# Patient Record
Sex: MALE | Race: WHITE | NOT HISPANIC OR LATINO | Employment: OTHER | URBAN - METROPOLITAN AREA
[De-identification: names, ages, dates, MRNs, and addresses within clinical notes are randomized per-mention and may not be internally consistent; named-entity substitution may affect disease eponyms.]

---

## 2017-01-01 ENCOUNTER — INFUSION (OUTPATIENT)
Dept: ONCOLOGY | Facility: HOSPITAL | Age: 67
End: 2017-01-01

## 2017-01-01 ENCOUNTER — DOCUMENTATION (OUTPATIENT)
Dept: ONCOLOGY | Facility: CLINIC | Age: 67
End: 2017-01-01

## 2017-01-01 ENCOUNTER — ANESTHESIA (OUTPATIENT)
Dept: PERIOP | Facility: HOSPITAL | Age: 67
End: 2017-01-01

## 2017-01-01 ENCOUNTER — APPOINTMENT (OUTPATIENT)
Dept: ONCOLOGY | Facility: HOSPITAL | Age: 67
End: 2017-01-01

## 2017-01-01 ENCOUNTER — HOSPITAL ENCOUNTER (OUTPATIENT)
Facility: HOSPITAL | Age: 67
Discharge: HOME | End: 2017-10-31
Attending: EMERGENCY MEDICINE | Admitting: INTERNAL MEDICINE

## 2017-01-01 ENCOUNTER — OFFICE VISIT (OUTPATIENT)
Dept: ONCOLOGY | Facility: CLINIC | Age: 67
End: 2017-01-01

## 2017-01-01 ENCOUNTER — HOSPITAL ENCOUNTER (OUTPATIENT)
Dept: ULTRASOUND IMAGING | Facility: HOSPITAL | Age: 67
Discharge: HOME | End: 2017-08-16

## 2017-01-01 ENCOUNTER — TELEPHONE (OUTPATIENT)
Dept: ONCOLOGY | Facility: HOSPITAL | Age: 67
End: 2017-01-01

## 2017-01-01 ENCOUNTER — TELEPHONE (OUTPATIENT)
Dept: INTERVENTIONAL RADIOLOGY/VASCULAR | Facility: HOSPITAL | Age: 67
End: 2017-01-01

## 2017-01-01 ENCOUNTER — LAB (OUTPATIENT)
Dept: OTHER | Facility: HOSPITAL | Age: 67
End: 2017-01-01

## 2017-01-01 ENCOUNTER — TELEPHONE (OUTPATIENT)
Dept: ONCOLOGY | Facility: CLINIC | Age: 67
End: 2017-01-01

## 2017-01-01 ENCOUNTER — HOSPITAL ENCOUNTER (EMERGENCY)
Facility: HOSPITAL | Age: 67
Discharge: HOME | End: 2017-09-21
Attending: EMERGENCY MEDICINE | Admitting: EMERGENCY MEDICINE

## 2017-01-01 ENCOUNTER — APPOINTMENT (OUTPATIENT)
Dept: CT IMAGING | Facility: HOSPITAL | Age: 67
End: 2017-01-01

## 2017-01-01 ENCOUNTER — ANESTHESIA EVENT (OUTPATIENT)
Dept: PERIOP | Facility: HOSPITAL | Age: 67
End: 2017-01-01

## 2017-01-01 ENCOUNTER — APPOINTMENT (OUTPATIENT)
Dept: OTHER | Facility: HOSPITAL | Age: 67
End: 2017-01-01

## 2017-01-01 ENCOUNTER — HOSPITAL ENCOUNTER (OUTPATIENT)
Dept: ULTRASOUND IMAGING | Facility: HOSPITAL | Age: 67
Discharge: HOME | End: 2017-08-16
Admitting: NURSE PRACTITIONER

## 2017-01-01 ENCOUNTER — HOSPITAL ENCOUNTER (OUTPATIENT)
Dept: PET IMAGING | Facility: HOSPITAL | Age: 67
Discharge: HOME | End: 2017-07-06
Attending: INTERNAL MEDICINE | Admitting: INTERNAL MEDICINE

## 2017-01-01 ENCOUNTER — APPOINTMENT (OUTPATIENT)
Dept: GENERAL RADIOLOGY | Facility: HOSPITAL | Age: 67
End: 2017-01-01

## 2017-01-01 VITALS
BODY MASS INDEX: 29.82 KG/M2 | RESPIRATION RATE: 18 BRPM | OXYGEN SATURATION: 98 % | SYSTOLIC BLOOD PRESSURE: 118 MMHG | HEIGHT: 73 IN | TEMPERATURE: 97.7 F | WEIGHT: 225 LBS | DIASTOLIC BLOOD PRESSURE: 73 MMHG | HEART RATE: 92 BPM

## 2017-01-01 VITALS
RESPIRATION RATE: 16 BRPM | BODY MASS INDEX: 30.75 KG/M2 | DIASTOLIC BLOOD PRESSURE: 69 MMHG | SYSTOLIC BLOOD PRESSURE: 110 MMHG | HEART RATE: 84 BPM | TEMPERATURE: 97.9 F | HEIGHT: 73 IN | WEIGHT: 232 LBS | OXYGEN SATURATION: 95 %

## 2017-01-01 VITALS
HEART RATE: 76 BPM | SYSTOLIC BLOOD PRESSURE: 95 MMHG | WEIGHT: 236.6 LBS | OXYGEN SATURATION: 96 % | DIASTOLIC BLOOD PRESSURE: 63 MMHG | BODY MASS INDEX: 31.22 KG/M2 | TEMPERATURE: 98.7 F

## 2017-01-01 VITALS
OXYGEN SATURATION: 94 % | HEIGHT: 73 IN | WEIGHT: 224.6 LBS | DIASTOLIC BLOOD PRESSURE: 69 MMHG | HEART RATE: 95 BPM | BODY MASS INDEX: 29.77 KG/M2 | TEMPERATURE: 97.6 F | SYSTOLIC BLOOD PRESSURE: 103 MMHG

## 2017-01-01 VITALS
SYSTOLIC BLOOD PRESSURE: 114 MMHG | HEIGHT: 73 IN | RESPIRATION RATE: 16 BRPM | HEART RATE: 86 BPM | WEIGHT: 236 LBS | TEMPERATURE: 97.6 F | OXYGEN SATURATION: 94 % | BODY MASS INDEX: 31.28 KG/M2 | DIASTOLIC BLOOD PRESSURE: 69 MMHG

## 2017-01-01 VITALS
RESPIRATION RATE: 18 BRPM | HEART RATE: 88 BPM | WEIGHT: 233 LBS | OXYGEN SATURATION: 95 % | TEMPERATURE: 97.6 F | DIASTOLIC BLOOD PRESSURE: 75 MMHG | HEIGHT: 73 IN | SYSTOLIC BLOOD PRESSURE: 116 MMHG | BODY MASS INDEX: 30.88 KG/M2

## 2017-01-01 VITALS
BODY MASS INDEX: 29.69 KG/M2 | WEIGHT: 224 LBS | HEART RATE: 96 BPM | RESPIRATION RATE: 18 BRPM | TEMPERATURE: 97.7 F | DIASTOLIC BLOOD PRESSURE: 62 MMHG | HEIGHT: 73 IN | SYSTOLIC BLOOD PRESSURE: 101 MMHG | OXYGEN SATURATION: 96 %

## 2017-01-01 VITALS
BODY MASS INDEX: 31.28 KG/M2 | SYSTOLIC BLOOD PRESSURE: 90 MMHG | HEART RATE: 72 BPM | DIASTOLIC BLOOD PRESSURE: 60 MMHG | RESPIRATION RATE: 16 BRPM | HEIGHT: 73 IN | WEIGHT: 236 LBS | TEMPERATURE: 97.9 F

## 2017-01-01 VITALS
DIASTOLIC BLOOD PRESSURE: 65 MMHG | BODY MASS INDEX: 29.1 KG/M2 | HEIGHT: 73 IN | TEMPERATURE: 97.5 F | TEMPERATURE: 98 F | BODY MASS INDEX: 31.14 KG/M2 | SYSTOLIC BLOOD PRESSURE: 107 MMHG | OXYGEN SATURATION: 97 % | RESPIRATION RATE: 16 BRPM | HEART RATE: 88 BPM | WEIGHT: 236 LBS | HEART RATE: 83 BPM | OXYGEN SATURATION: 95 % | DIASTOLIC BLOOD PRESSURE: 63 MMHG | SYSTOLIC BLOOD PRESSURE: 97 MMHG

## 2017-01-01 VITALS
TEMPERATURE: 97.6 F | BODY MASS INDEX: 29.8 KG/M2 | BODY MASS INDEX: 29.69 KG/M2 | SYSTOLIC BLOOD PRESSURE: 109 MMHG | TEMPERATURE: 97.7 F | SYSTOLIC BLOOD PRESSURE: 103 MMHG | DIASTOLIC BLOOD PRESSURE: 68 MMHG | HEART RATE: 93 BPM | OXYGEN SATURATION: 95 % | DIASTOLIC BLOOD PRESSURE: 74 MMHG | WEIGHT: 225.8 LBS | WEIGHT: 225 LBS | HEART RATE: 93 BPM

## 2017-01-01 VITALS
DIASTOLIC BLOOD PRESSURE: 64 MMHG | RESPIRATION RATE: 16 BRPM | OXYGEN SATURATION: 94 % | BODY MASS INDEX: 29.63 KG/M2 | TEMPERATURE: 98.1 F | HEART RATE: 91 BPM | HEIGHT: 73 IN | WEIGHT: 223.6 LBS | SYSTOLIC BLOOD PRESSURE: 101 MMHG

## 2017-01-01 VITALS
OXYGEN SATURATION: 96 % | SYSTOLIC BLOOD PRESSURE: 89 MMHG | WEIGHT: 239.8 LBS | DIASTOLIC BLOOD PRESSURE: 63 MMHG | SYSTOLIC BLOOD PRESSURE: 102 MMHG | DIASTOLIC BLOOD PRESSURE: 50 MMHG | BODY MASS INDEX: 31.64 KG/M2 | WEIGHT: 216.4 LBS | BODY MASS INDEX: 28.55 KG/M2 | TEMPERATURE: 98.3 F | OXYGEN SATURATION: 95 % | HEART RATE: 83 BPM | HEART RATE: 88 BPM | TEMPERATURE: 99.1 F

## 2017-01-01 VITALS
HEART RATE: 93 BPM | WEIGHT: 216.4 LBS | DIASTOLIC BLOOD PRESSURE: 62 MMHG | SYSTOLIC BLOOD PRESSURE: 96 MMHG | OXYGEN SATURATION: 91 % | TEMPERATURE: 97.7 F | BODY MASS INDEX: 28.55 KG/M2

## 2017-01-01 VITALS
TEMPERATURE: 97.8 F | HEIGHT: 73 IN | SYSTOLIC BLOOD PRESSURE: 116 MMHG | WEIGHT: 248.6 LBS | OXYGEN SATURATION: 96 % | BODY MASS INDEX: 32.95 KG/M2 | HEART RATE: 96 BPM | DIASTOLIC BLOOD PRESSURE: 72 MMHG

## 2017-01-01 VITALS
DIASTOLIC BLOOD PRESSURE: 69 MMHG | TEMPERATURE: 97.2 F | SYSTOLIC BLOOD PRESSURE: 109 MMHG | BODY MASS INDEX: 28.5 KG/M2 | HEART RATE: 98 BPM | OXYGEN SATURATION: 94 % | WEIGHT: 216 LBS

## 2017-01-01 VITALS
DIASTOLIC BLOOD PRESSURE: 71 MMHG | RESPIRATION RATE: 16 BRPM | OXYGEN SATURATION: 97 % | BODY MASS INDEX: 33.59 KG/M2 | WEIGHT: 248 LBS | HEIGHT: 72 IN | SYSTOLIC BLOOD PRESSURE: 124 MMHG | TEMPERATURE: 97 F | HEART RATE: 87 BPM

## 2017-01-01 VITALS
WEIGHT: 237 LBS | OXYGEN SATURATION: 95 % | SYSTOLIC BLOOD PRESSURE: 96 MMHG | TEMPERATURE: 98.5 F | HEART RATE: 73 BPM | DIASTOLIC BLOOD PRESSURE: 64 MMHG | BODY MASS INDEX: 31.27 KG/M2

## 2017-01-01 VITALS — DIASTOLIC BLOOD PRESSURE: 63 MMHG | TEMPERATURE: 98.1 F | SYSTOLIC BLOOD PRESSURE: 99 MMHG | HEART RATE: 81 BPM

## 2017-01-01 VITALS
BODY MASS INDEX: 31.82 KG/M2 | WEIGHT: 241.2 LBS | DIASTOLIC BLOOD PRESSURE: 63 MMHG | HEART RATE: 80 BPM | SYSTOLIC BLOOD PRESSURE: 97 MMHG | TEMPERATURE: 97.8 F

## 2017-01-01 VITALS
RESPIRATION RATE: 18 BRPM | WEIGHT: 223 LBS | HEIGHT: 73 IN | TEMPERATURE: 98.3 F | SYSTOLIC BLOOD PRESSURE: 94 MMHG | OXYGEN SATURATION: 94 % | HEART RATE: 92 BPM | BODY MASS INDEX: 29.55 KG/M2 | DIASTOLIC BLOOD PRESSURE: 65 MMHG

## 2017-01-01 VITALS
DIASTOLIC BLOOD PRESSURE: 69 MMHG | BODY MASS INDEX: 28.26 KG/M2 | WEIGHT: 214.2 LBS | HEART RATE: 90 BPM | OXYGEN SATURATION: 96 % | TEMPERATURE: 97.3 F | SYSTOLIC BLOOD PRESSURE: 105 MMHG

## 2017-01-01 VITALS
BODY MASS INDEX: 29.12 KG/M2 | HEIGHT: 72 IN | WEIGHT: 215 LBS | OXYGEN SATURATION: 97 % | RESPIRATION RATE: 18 BRPM | TEMPERATURE: 98.2 F | DIASTOLIC BLOOD PRESSURE: 88 MMHG | SYSTOLIC BLOOD PRESSURE: 121 MMHG | HEART RATE: 96 BPM

## 2017-01-01 VITALS — BODY MASS INDEX: 29.42 KG/M2 | WEIGHT: 223 LBS

## 2017-01-01 VITALS
WEIGHT: 239.6 LBS | HEART RATE: 73 BPM | DIASTOLIC BLOOD PRESSURE: 67 MMHG | OXYGEN SATURATION: 96 % | TEMPERATURE: 98.5 F | SYSTOLIC BLOOD PRESSURE: 102 MMHG | BODY MASS INDEX: 31.61 KG/M2

## 2017-01-01 VITALS — WEIGHT: 220.6 LBS | BODY MASS INDEX: 29.1 KG/M2

## 2017-01-01 VITALS — WEIGHT: 239.2 LBS | BODY MASS INDEX: 31.56 KG/M2

## 2017-01-01 DIAGNOSIS — C79.51 METASTASIS TO SPINAL COLUMN (HCC): ICD-10-CM

## 2017-01-01 DIAGNOSIS — D70.1 LEUKOPENIA DUE TO ANTINEOPLASTIC CHEMOTHERAPY (HCC): ICD-10-CM

## 2017-01-01 DIAGNOSIS — C25.9 PANCREAS CARCINOMA (HCC): ICD-10-CM

## 2017-01-01 DIAGNOSIS — C78.7 LIVER METASTASES: ICD-10-CM

## 2017-01-01 DIAGNOSIS — R16.1 SPLENOMEGALY: ICD-10-CM

## 2017-01-01 DIAGNOSIS — C25.9 PANCREAS CARCINOMA (HCC): Primary | ICD-10-CM

## 2017-01-01 DIAGNOSIS — G89.3 CANCER ASSOCIATED PAIN: ICD-10-CM

## 2017-01-01 DIAGNOSIS — D61.810 PANCYTOPENIA DUE TO CHEMOTHERAPY (HCC): ICD-10-CM

## 2017-01-01 DIAGNOSIS — C79.51 BONE METASTASIS: ICD-10-CM

## 2017-01-01 DIAGNOSIS — R18.0 MALIGNANT ASCITES: ICD-10-CM

## 2017-01-01 DIAGNOSIS — Z45.2 ENCOUNTER FOR ADJUSTMENT OR MANAGEMENT OF VASCULAR ACCESS DEVICE: ICD-10-CM

## 2017-01-01 DIAGNOSIS — T45.1X5A LEUKOPENIA DUE TO ANTINEOPLASTIC CHEMOTHERAPY (HCC): ICD-10-CM

## 2017-01-01 DIAGNOSIS — T45.1X5A CHEMOTHERAPY INDUCED DIARRHEA: ICD-10-CM

## 2017-01-01 DIAGNOSIS — T45.1X5A NEUROPATHY DUE TO CHEMOTHERAPEUTIC DRUG (HCC): ICD-10-CM

## 2017-01-01 DIAGNOSIS — D63.0 ANEMIA IN NEOPLASTIC DISEASE: ICD-10-CM

## 2017-01-01 DIAGNOSIS — G62.0 NEUROPATHY DUE TO CHEMOTHERAPEUTIC DRUG (HCC): ICD-10-CM

## 2017-01-01 DIAGNOSIS — L98.9 SKIN ABNORMALITY: Primary | ICD-10-CM

## 2017-01-01 DIAGNOSIS — G62.0 NEUROPATHY DUE TO CHEMOTHERAPEUTIC DRUG (HCC): Primary | ICD-10-CM

## 2017-01-01 DIAGNOSIS — D69.6 THROMBOCYTOPENIA DUE TO SEQUESTRATION (HCC): ICD-10-CM

## 2017-01-01 DIAGNOSIS — D69.6 ACQUIRED THROMBOCYTOPENIA (HCC): ICD-10-CM

## 2017-01-01 DIAGNOSIS — Z45.2 ENCOUNTER FOR ADJUSTMENT OR MANAGEMENT OF VASCULAR ACCESS DEVICE: Primary | ICD-10-CM

## 2017-01-01 DIAGNOSIS — T45.1X5A NEUROPATHY DUE TO CHEMOTHERAPEUTIC DRUG (HCC): Primary | ICD-10-CM

## 2017-01-01 DIAGNOSIS — C78.7 LIVER METASTASES: Primary | ICD-10-CM

## 2017-01-01 DIAGNOSIS — K52.1 CHEMOTHERAPY INDUCED DIARRHEA: ICD-10-CM

## 2017-01-01 DIAGNOSIS — E86.0 DEHYDRATION: ICD-10-CM

## 2017-01-01 DIAGNOSIS — N20.1 URETERAL STONE: Primary | ICD-10-CM

## 2017-01-01 DIAGNOSIS — N13.2 URETERAL STONE WITH HYDRONEPHROSIS: Primary | ICD-10-CM

## 2017-01-01 DIAGNOSIS — R52 INTRACTABLE PAIN: ICD-10-CM

## 2017-01-01 DIAGNOSIS — R19.7 DIARRHEA, UNSPECIFIED TYPE: Primary | ICD-10-CM

## 2017-01-01 LAB
ALBUMIN SERPL-MCNC: 2.7 G/DL (ref 3.5–5.2)
ALBUMIN SERPL-MCNC: 2.8 G/DL (ref 3.5–5.2)
ALBUMIN SERPL-MCNC: 2.9 G/DL (ref 3.5–5.2)
ALBUMIN SERPL-MCNC: 3 G/DL (ref 3.5–5.2)
ALBUMIN SERPL-MCNC: 3.1 G/DL (ref 3.5–5.2)
ALBUMIN SERPL-MCNC: 3.2 G/DL (ref 3.5–5.2)
ALBUMIN SERPL-MCNC: 3.3 G/DL (ref 3.5–5.2)
ALBUMIN SERPL-MCNC: 3.3 G/DL (ref 3.5–5.2)
ALBUMIN SERPL-MCNC: 3.4 G/DL (ref 3.5–5.2)
ALBUMIN SERPL-MCNC: 3.4 G/DL (ref 3.5–5.2)
ALBUMIN SERPL-MCNC: 3.5 G/DL (ref 3.5–5.2)
ALBUMIN/GLOB SERPL: 0.8 G/DL
ALBUMIN/GLOB SERPL: 0.9 G/DL
ALBUMIN/GLOB SERPL: 1 G/DL
ALBUMIN/GLOB SERPL: 1.1 G/DL
ALBUMIN/GLOB SERPL: 1.2 G/DL
ALBUMIN/GLOB SERPL: 1.2 G/DL
ALBUMIN/GLOB SERPL: 1.3 G/DL
ALBUMIN/GLOB SERPL: 1.3 G/DL (ref 1.1–2.4)
ALBUMIN/GLOB SERPL: 1.4 G/DL
ALBUMIN/GLOB SERPL: 1.5 G/DL
ALP SERPL-CCNC: 35 U/L (ref 38–116)
ALP SERPL-CCNC: 35 U/L (ref 39–117)
ALP SERPL-CCNC: 35 U/L (ref 39–117)
ALP SERPL-CCNC: 36 U/L (ref 39–117)
ALP SERPL-CCNC: 37 U/L (ref 39–117)
ALP SERPL-CCNC: 39 U/L (ref 39–117)
ALP SERPL-CCNC: 40 U/L (ref 39–117)
ALP SERPL-CCNC: 41 U/L (ref 39–117)
ALP SERPL-CCNC: 42 U/L (ref 39–117)
ALP SERPL-CCNC: 42 U/L (ref 39–117)
ALP SERPL-CCNC: 45 U/L (ref 39–117)
ALP SERPL-CCNC: 46 U/L (ref 39–117)
ALP SERPL-CCNC: 47 U/L (ref 39–117)
ALP SERPL-CCNC: 48 U/L (ref 39–117)
ALP SERPL-CCNC: 49 U/L (ref 39–117)
ALP SERPL-CCNC: 57 U/L (ref 39–117)
ALP SERPL-CCNC: 60 U/L (ref 39–117)
ALT SERPL W P-5'-P-CCNC: 19 U/L (ref 1–41)
ALT SERPL W P-5'-P-CCNC: 20 U/L (ref 1–41)
ALT SERPL W P-5'-P-CCNC: 20 U/L (ref 1–41)
ALT SERPL W P-5'-P-CCNC: 21 U/L (ref 1–41)
ALT SERPL W P-5'-P-CCNC: 21 U/L (ref 1–41)
ALT SERPL W P-5'-P-CCNC: 22 U/L (ref 1–41)
ALT SERPL W P-5'-P-CCNC: 23 U/L (ref 1–41)
ALT SERPL W P-5'-P-CCNC: 24 U/L (ref 1–41)
ALT SERPL W P-5'-P-CCNC: 25 U/L (ref 1–41)
ALT SERPL W P-5'-P-CCNC: 25 U/L (ref 1–41)
ALT SERPL W P-5'-P-CCNC: 27 U/L (ref 1–41)
ALT SERPL W P-5'-P-CCNC: 28 U/L (ref 1–41)
ALT SERPL W P-5'-P-CCNC: 35 U/L (ref 1–41)
ALT SERPL W P-5'-P-CCNC: 37 U/L (ref 1–41)
ALT SERPL W P-5'-P-CCNC: 39 U/L (ref 1–41)
ALT SERPL W P-5'-P-CCNC: 41 U/L (ref 0–41)
ALT SERPL W P-5'-P-CCNC: 42 U/L (ref 1–41)
ANION GAP SERPL CALCULATED.3IONS-SCNC: 10.3 MMOL/L
ANION GAP SERPL CALCULATED.3IONS-SCNC: 10.6 MMOL/L
ANION GAP SERPL CALCULATED.3IONS-SCNC: 10.6 MMOL/L
ANION GAP SERPL CALCULATED.3IONS-SCNC: 11.3 MMOL/L
ANION GAP SERPL CALCULATED.3IONS-SCNC: 11.8 MMOL/L
ANION GAP SERPL CALCULATED.3IONS-SCNC: 11.9 MMOL/L
ANION GAP SERPL CALCULATED.3IONS-SCNC: 12.1 MMOL/L
ANION GAP SERPL CALCULATED.3IONS-SCNC: 12.5 MMOL/L
ANION GAP SERPL CALCULATED.3IONS-SCNC: 12.7 MMOL/L
ANION GAP SERPL CALCULATED.3IONS-SCNC: 12.8 MMOL/L
ANION GAP SERPL CALCULATED.3IONS-SCNC: 13 MMOL/L
ANION GAP SERPL CALCULATED.3IONS-SCNC: 13.1 MMOL/L
ANION GAP SERPL CALCULATED.3IONS-SCNC: 13.3 MMOL/L
ANION GAP SERPL CALCULATED.3IONS-SCNC: 13.5 MMOL/L
ANION GAP SERPL CALCULATED.3IONS-SCNC: 14 MMOL/L
ANION GAP SERPL CALCULATED.3IONS-SCNC: 14.4 MMOL/L
ANION GAP SERPL CALCULATED.3IONS-SCNC: 14.9 MMOL/L
ANION GAP SERPL CALCULATED.3IONS-SCNC: 15.4 MMOL/L
ANION GAP SERPL CALCULATED.3IONS-SCNC: 15.9 MMOL/L
ANION GAP SERPL CALCULATED.3IONS-SCNC: 18.7 MMOL/L
ANION GAP SERPL CALCULATED.3IONS-SCNC: 9.8 MMOL/L
ANION GAP SERPL CALCULATED.3IONS-SCNC: 9.9 MMOL/L
ANISOCYTOSIS BLD QL: NORMAL
AST SERPL-CCNC: 28 U/L (ref 1–40)
AST SERPL-CCNC: 40 U/L (ref 1–40)
AST SERPL-CCNC: 43 U/L (ref 1–40)
AST SERPL-CCNC: 45 U/L (ref 1–40)
AST SERPL-CCNC: 46 U/L (ref 0–40)
AST SERPL-CCNC: 46 U/L (ref 1–40)
AST SERPL-CCNC: 47 U/L (ref 1–40)
AST SERPL-CCNC: 47 U/L (ref 1–40)
AST SERPL-CCNC: 48 U/L (ref 1–40)
AST SERPL-CCNC: 49 U/L (ref 1–40)
AST SERPL-CCNC: 50 U/L (ref 1–40)
AST SERPL-CCNC: 50 U/L (ref 1–40)
AST SERPL-CCNC: 51 U/L (ref 1–40)
AST SERPL-CCNC: 53 U/L (ref 1–40)
AST SERPL-CCNC: 55 U/L (ref 1–40)
AST SERPL-CCNC: 61 U/L (ref 1–40)
AST SERPL-CCNC: 62 U/L (ref 1–40)
AST SERPL-CCNC: 66 U/L (ref 1–40)
BACTERIA UR QL AUTO: ABNORMAL /HPF
BASOPHILS # BLD AUTO: 0.01 10*3/MM3 (ref 0–0.2)
BASOPHILS # BLD AUTO: 0.02 10*3/MM3 (ref 0–0.2)
BASOPHILS # BLD AUTO: 0.03 10*3/MM3 (ref 0–0.1)
BASOPHILS # BLD AUTO: 0.03 10*3/MM3 (ref 0–0.2)
BASOPHILS # BLD AUTO: 0.04 10*3/MM3 (ref 0–0.2)
BASOPHILS # BLD AUTO: 0.05 10*3/MM3 (ref 0–0.2)
BASOPHILS # BLD AUTO: 0.06 10*3/MM3 (ref 0–0.2)
BASOPHILS # BLD AUTO: 0.06 10*3/MM3 (ref 0–0.2)
BASOPHILS # BLD AUTO: 0.07 10*3/MM3 (ref 0–0.2)
BASOPHILS # BLD AUTO: 0.07 10*3/MM3 (ref 0–0.2)
BASOPHILS NFR BLD AUTO: 0.2 % (ref 0–1.5)
BASOPHILS NFR BLD AUTO: 0.3 % (ref 0–1.5)
BASOPHILS NFR BLD AUTO: 0.3 % (ref 0–1.5)
BASOPHILS NFR BLD AUTO: 0.5 % (ref 0–1.5)
BASOPHILS NFR BLD AUTO: 0.6 % (ref 0–1.5)
BASOPHILS NFR BLD AUTO: 0.7 % (ref 0–1.5)
BASOPHILS NFR BLD AUTO: 0.8 % (ref 0–1.1)
BASOPHILS NFR BLD AUTO: 0.8 % (ref 0–1.5)
BASOPHILS NFR BLD AUTO: 0.9 % (ref 0–1.5)
BASOPHILS NFR BLD AUTO: 1 % (ref 0–1.5)
BASOPHILS NFR BLD AUTO: 1 % (ref 0–1.5)
BILIRUB SERPL-MCNC: 0.5 MG/DL (ref 0.1–1.2)
BILIRUB SERPL-MCNC: 0.6 MG/DL (ref 0.1–1.2)
BILIRUB SERPL-MCNC: 0.7 MG/DL (ref 0.1–1.2)
BILIRUB SERPL-MCNC: 0.8 MG/DL (ref 0.1–1.2)
BILIRUB SERPL-MCNC: 0.9 MG/DL (ref 0.1–1.2)
BILIRUB SERPL-MCNC: 1 MG/DL (ref 0.1–1.2)
BILIRUB UR QL STRIP: NEGATIVE
BUN BLD-MCNC: 10 MG/DL (ref 8–23)
BUN BLD-MCNC: 10 MG/DL (ref 8–23)
BUN BLD-MCNC: 11 MG/DL (ref 6–20)
BUN BLD-MCNC: 11 MG/DL (ref 8–23)
BUN BLD-MCNC: 12 MG/DL (ref 8–23)
BUN BLD-MCNC: 13 MG/DL (ref 8–23)
BUN BLD-MCNC: 14 MG/DL (ref 8–23)
BUN BLD-MCNC: 14 MG/DL (ref 8–23)
BUN BLD-MCNC: 16 MG/DL (ref 8–23)
BUN BLD-MCNC: 17 MG/DL (ref 8–23)
BUN BLD-MCNC: 17 MG/DL (ref 8–23)
BUN BLD-MCNC: 19 MG/DL (ref 8–23)
BUN BLD-MCNC: 21 MG/DL (ref 8–23)
BUN BLD-MCNC: 8 MG/DL (ref 8–23)
BUN BLD-MCNC: 9 MG/DL (ref 8–23)
BUN/CREAT SERPL: 11.4 (ref 7–25)
BUN/CREAT SERPL: 11.8 (ref 7–25)
BUN/CREAT SERPL: 12.3 (ref 7–25)
BUN/CREAT SERPL: 12.3 (ref 7–25)
BUN/CREAT SERPL: 12.9 (ref 7–25)
BUN/CREAT SERPL: 12.9 (ref 7–25)
BUN/CREAT SERPL: 13.1 (ref 7–25)
BUN/CREAT SERPL: 13.4 (ref 7–25)
BUN/CREAT SERPL: 13.6 (ref 7.3–30)
BUN/CREAT SERPL: 14.3 (ref 7–25)
BUN/CREAT SERPL: 14.3 (ref 7–25)
BUN/CREAT SERPL: 14.9 (ref 7–25)
BUN/CREAT SERPL: 16.3 (ref 7–25)
BUN/CREAT SERPL: 16.7 (ref 7–25)
BUN/CREAT SERPL: 17.3 (ref 7–25)
BUN/CREAT SERPL: 17.5 (ref 7–25)
BUN/CREAT SERPL: 17.6 (ref 7–25)
BUN/CREAT SERPL: 18.2 (ref 7–25)
BUN/CREAT SERPL: 18.2 (ref 7–25)
BUN/CREAT SERPL: 19.4 (ref 7–25)
BUN/CREAT SERPL: 21.4 (ref 7–25)
BUN/CREAT SERPL: 23.9 (ref 7–25)
BUN/CREAT SERPL: 9.4 (ref 7–25)
BUN/CREAT SERPL: 9.8 (ref 7–25)
BURR CELLS BLD QL SMEAR: NORMAL
CALCIUM SPEC-SCNC: 8.5 MG/DL (ref 8.6–10.5)
CALCIUM SPEC-SCNC: 8.5 MG/DL (ref 8.6–10.5)
CALCIUM SPEC-SCNC: 8.6 MG/DL (ref 8.6–10.5)
CALCIUM SPEC-SCNC: 8.7 MG/DL (ref 8.6–10.5)
CALCIUM SPEC-SCNC: 8.8 MG/DL (ref 8.6–10.5)
CALCIUM SPEC-SCNC: 8.9 MG/DL (ref 8.6–10.5)
CALCIUM SPEC-SCNC: 9 MG/DL (ref 8.6–10.5)
CALCIUM SPEC-SCNC: 9 MG/DL (ref 8.6–10.5)
CALCIUM SPEC-SCNC: 9.1 MG/DL (ref 8.5–10.2)
CALCIUM SPEC-SCNC: 9.1 MG/DL (ref 8.6–10.5)
CALCIUM SPEC-SCNC: 9.2 MG/DL (ref 8.6–10.5)
CANCER AG19-9 SERPL-ACNC: 134.8 U/ML
CANCER AG19-9 SERPL-ACNC: 155 U/ML (ref 0–35)
CANCER AG19-9 SERPL-ACNC: 178.6 U/ML
CANCER AG19-9 SERPL-ACNC: 202.3 U/ML
CANCER AG19-9 SERPL-ACNC: 361.8 U/ML
CHLORIDE SERPL-SCNC: 100 MMOL/L (ref 98–107)
CHLORIDE SERPL-SCNC: 103 MMOL/L (ref 98–107)
CHLORIDE SERPL-SCNC: 104 MMOL/L (ref 98–107)
CHLORIDE SERPL-SCNC: 105 MMOL/L (ref 98–107)
CHLORIDE SERPL-SCNC: 106 MMOL/L (ref 98–107)
CHLORIDE SERPL-SCNC: 107 MMOL/L (ref 98–107)
CHLORIDE SERPL-SCNC: 107 MMOL/L (ref 98–107)
CHLORIDE SERPL-SCNC: 97 MMOL/L (ref 98–107)
CHLORIDE SERPL-SCNC: 98 MMOL/L (ref 98–107)
CLARITY UR: ABNORMAL
CO2 SERPL-SCNC: 17.1 MMOL/L (ref 22–29)
CO2 SERPL-SCNC: 19.6 MMOL/L (ref 22–29)
CO2 SERPL-SCNC: 20 MMOL/L (ref 22–29)
CO2 SERPL-SCNC: 21.3 MMOL/L (ref 22–29)
CO2 SERPL-SCNC: 23.1 MMOL/L (ref 22–29)
CO2 SERPL-SCNC: 23.3 MMOL/L (ref 22–29)
CO2 SERPL-SCNC: 23.5 MMOL/L (ref 22–29)
CO2 SERPL-SCNC: 23.5 MMOL/L (ref 22–29)
CO2 SERPL-SCNC: 23.6 MMOL/L (ref 22–29)
CO2 SERPL-SCNC: 23.7 MMOL/L (ref 22–29)
CO2 SERPL-SCNC: 23.7 MMOL/L (ref 22–29)
CO2 SERPL-SCNC: 23.9 MMOL/L (ref 22–29)
CO2 SERPL-SCNC: 23.9 MMOL/L (ref 22–29)
CO2 SERPL-SCNC: 24.1 MMOL/L (ref 22–29)
CO2 SERPL-SCNC: 24.2 MMOL/L (ref 22–29)
CO2 SERPL-SCNC: 24.7 MMOL/L (ref 22–29)
CO2 SERPL-SCNC: 24.7 MMOL/L (ref 22–29)
CO2 SERPL-SCNC: 25 MMOL/L (ref 22–29)
CO2 SERPL-SCNC: 25.1 MMOL/L (ref 22–29)
CO2 SERPL-SCNC: 25.2 MMOL/L (ref 22–29)
CO2 SERPL-SCNC: 25.2 MMOL/L (ref 22–29)
CO2 SERPL-SCNC: 25.4 MMOL/L (ref 22–29)
CO2 SERPL-SCNC: 25.4 MMOL/L (ref 22–29)
CO2 SERPL-SCNC: 25.7 MMOL/L (ref 22–29)
COLOR UR: YELLOW
CREAT BLD-MCNC: 0.65 MG/DL (ref 0.76–1.27)
CREAT BLD-MCNC: 0.65 MG/DL (ref 0.76–1.27)
CREAT BLD-MCNC: 0.67 MG/DL (ref 0.76–1.27)
CREAT BLD-MCNC: 0.68 MG/DL (ref 0.76–1.27)
CREAT BLD-MCNC: 0.7 MG/DL (ref 0.76–1.27)
CREAT BLD-MCNC: 0.71 MG/DL (ref 0.76–1.27)
CREAT BLD-MCNC: 0.72 MG/DL (ref 0.76–1.27)
CREAT BLD-MCNC: 0.77 MG/DL (ref 0.76–1.27)
CREAT BLD-MCNC: 0.79 MG/DL (ref 0.76–1.27)
CREAT BLD-MCNC: 0.81 MG/DL (ref 0.7–1.3)
CREAT BLD-MCNC: 0.84 MG/DL (ref 0.76–1.27)
CREAT BLD-MCNC: 0.84 MG/DL (ref 0.76–1.27)
CREAT BLD-MCNC: 0.85 MG/DL (ref 0.76–1.27)
CREAT BLD-MCNC: 0.97 MG/DL (ref 0.76–1.27)
CREAT BLD-MCNC: 0.98 MG/DL (ref 0.76–1.27)
CREAT BLD-MCNC: 0.98 MG/DL (ref 0.76–1.27)
CREAT BLD-MCNC: 1.1 MG/DL (ref 0.76–1.27)
CREAT BLD-MCNC: 1.23 MG/DL (ref 0.76–1.27)
CREAT BLDA-MCNC: 0.7 MG/DL (ref 0.6–1.3)
CYTO UR: NORMAL
DACRYOCYTES BLD QL SMEAR: NORMAL
DACRYOCYTES BLD QL SMEAR: NORMAL
DEPRECATED RDW RBC AUTO: 60.5 FL (ref 37–54)
DEPRECATED RDW RBC AUTO: 60.6 FL (ref 37–54)
DEPRECATED RDW RBC AUTO: 60.8 FL (ref 37–54)
DEPRECATED RDW RBC AUTO: 60.9 FL (ref 37–54)
DEPRECATED RDW RBC AUTO: 61.5 FL (ref 37–54)
DEPRECATED RDW RBC AUTO: 62.4 FL (ref 37–54)
DEPRECATED RDW RBC AUTO: 62.4 FL (ref 37–54)
DEPRECATED RDW RBC AUTO: 63.2 FL (ref 37–54)
DEPRECATED RDW RBC AUTO: 65.8 FL (ref 37–54)
DEPRECATED RDW RBC AUTO: 67.7 FL (ref 37–54)
DEPRECATED RDW RBC AUTO: 70.4 FL (ref 37–54)
DEPRECATED RDW RBC AUTO: 72.6 FL (ref 37–54)
DEPRECATED RDW RBC AUTO: 77 FL (ref 37–54)
DEPRECATED RDW RBC AUTO: 80.1 FL (ref 37–54)
DEPRECATED RDW RBC AUTO: 80.3 FL (ref 37–54)
DEPRECATED RDW RBC AUTO: 82.4 FL (ref 37–54)
DEPRECATED RDW RBC AUTO: 84.7 FL (ref 37–49)
DEPRECATED RDW RBC AUTO: 85.1 FL (ref 37–54)
DEPRECATED RDW RBC AUTO: 85.4 FL (ref 37–54)
DEPRECATED RDW RBC AUTO: 86 FL (ref 37–54)
DEPRECATED RDW RBC AUTO: 87 FL (ref 37–54)
DEPRECATED RDW RBC AUTO: 87.3 FL (ref 37–54)
DEPRECATED RDW RBC AUTO: 87.6 FL (ref 37–54)
ELLIPTOCYTES BLD QL SMEAR: NORMAL
EOSINOPHIL # BLD AUTO: 0.04 10*3/MM3 (ref 0–0.7)
EOSINOPHIL # BLD AUTO: 0.08 10*3/MM3 (ref 0–0.7)
EOSINOPHIL # BLD AUTO: 0.09 10*3/MM3 (ref 0–0.7)
EOSINOPHIL # BLD AUTO: 0.11 10*3/MM3 (ref 0–0.7)
EOSINOPHIL # BLD AUTO: 0.12 10*3/MM3 (ref 0–0.7)
EOSINOPHIL # BLD AUTO: 0.14 10*3/MM3 (ref 0–0.7)
EOSINOPHIL # BLD AUTO: 0.15 10*3/MM3 (ref 0–0.7)
EOSINOPHIL # BLD AUTO: 0.16 10*3/MM3 (ref 0–0.7)
EOSINOPHIL # BLD AUTO: 0.17 10*3/MM3 (ref 0–0.7)
EOSINOPHIL # BLD AUTO: 0.19 10*3/MM3 (ref 0–0.7)
EOSINOPHIL # BLD AUTO: 0.19 10*3/MM3 (ref 0–0.7)
EOSINOPHIL # BLD AUTO: 0.2 10*3/MM3 (ref 0–0.7)
EOSINOPHIL # BLD AUTO: 0.22 10*3/MM3 (ref 0–0.7)
EOSINOPHIL # BLD AUTO: 0.27 10*3/MM3 (ref 0–0.7)
EOSINOPHIL # BLD AUTO: 0.32 10*3/MM3 (ref 0–0.36)
EOSINOPHIL # BLD AUTO: 0.34 10*3/MM3 (ref 0–0.7)
EOSINOPHIL # BLD AUTO: 0.35 10*3/MM3 (ref 0–0.7)
EOSINOPHIL # BLD AUTO: 0.35 10*3/MM3 (ref 0–0.7)
EOSINOPHIL # BLD AUTO: 0.36 10*3/MM3 (ref 0–0.7)
EOSINOPHIL # BLD AUTO: 0.37 10*3/MM3 (ref 0–0.7)
EOSINOPHIL # BLD AUTO: 0.37 10*3/MM3 (ref 0–0.7)
EOSINOPHIL # BLD AUTO: 0.43 10*3/MM3 (ref 0–0.7)
EOSINOPHIL # BLD AUTO: 0.45 10*3/MM3 (ref 0–0.7)
EOSINOPHIL # BLD AUTO: 0.52 10*3/MM3 (ref 0–0.7)
EOSINOPHIL NFR BLD AUTO: 1 % (ref 0.3–6.2)
EOSINOPHIL NFR BLD AUTO: 1.5 % (ref 0.3–6.2)
EOSINOPHIL NFR BLD AUTO: 1.9 % (ref 0.3–6.2)
EOSINOPHIL NFR BLD AUTO: 10.6 % (ref 0.3–6.2)
EOSINOPHIL NFR BLD AUTO: 10.9 % (ref 0.3–6.2)
EOSINOPHIL NFR BLD AUTO: 13.4 % (ref 0.3–6.2)
EOSINOPHIL NFR BLD AUTO: 2 % (ref 0.3–6.2)
EOSINOPHIL NFR BLD AUTO: 3.2 % (ref 0.3–6.2)
EOSINOPHIL NFR BLD AUTO: 3.5 % (ref 0.3–6.2)
EOSINOPHIL NFR BLD AUTO: 3.7 % (ref 0.3–6.2)
EOSINOPHIL NFR BLD AUTO: 3.7 % (ref 0.3–6.2)
EOSINOPHIL NFR BLD AUTO: 3.8 % (ref 0.3–6.2)
EOSINOPHIL NFR BLD AUTO: 3.9 % (ref 0.3–6.2)
EOSINOPHIL NFR BLD AUTO: 4 % (ref 0.3–6.2)
EOSINOPHIL NFR BLD AUTO: 4.2 % (ref 0.3–6.2)
EOSINOPHIL NFR BLD AUTO: 4.2 % (ref 0.3–6.2)
EOSINOPHIL NFR BLD AUTO: 4.6 % (ref 0.3–6.2)
EOSINOPHIL NFR BLD AUTO: 5 % (ref 0.3–6.2)
EOSINOPHIL NFR BLD AUTO: 5.4 % (ref 0.3–6.2)
EOSINOPHIL NFR BLD AUTO: 6 % (ref 0.3–6.2)
EOSINOPHIL NFR BLD AUTO: 8.2 % (ref 0.3–6.2)
EOSINOPHIL NFR BLD AUTO: 8.5 % (ref 0.3–6.2)
EOSINOPHIL NFR BLD AUTO: 8.7 % (ref 1–5)
EOSINOPHIL NFR BLD AUTO: 9.2 % (ref 0.3–6.2)
ERYTHROCYTE [DISTWIDTH] IN BLOOD BY AUTOMATED COUNT: 15.6 % (ref 11.5–14.5)
ERYTHROCYTE [DISTWIDTH] IN BLOOD BY AUTOMATED COUNT: 15.7 % (ref 11.5–14.5)
ERYTHROCYTE [DISTWIDTH] IN BLOOD BY AUTOMATED COUNT: 15.9 % (ref 11.5–14.5)
ERYTHROCYTE [DISTWIDTH] IN BLOOD BY AUTOMATED COUNT: 16.1 % (ref 11.5–14.5)
ERYTHROCYTE [DISTWIDTH] IN BLOOD BY AUTOMATED COUNT: 16.2 % (ref 11.5–14.5)
ERYTHROCYTE [DISTWIDTH] IN BLOOD BY AUTOMATED COUNT: 16.6 % (ref 11.5–14.5)
ERYTHROCYTE [DISTWIDTH] IN BLOOD BY AUTOMATED COUNT: 17 % (ref 11.5–14.5)
ERYTHROCYTE [DISTWIDTH] IN BLOOD BY AUTOMATED COUNT: 17.1 % (ref 11.5–14.5)
ERYTHROCYTE [DISTWIDTH] IN BLOOD BY AUTOMATED COUNT: 18.7 % (ref 11.5–14.5)
ERYTHROCYTE [DISTWIDTH] IN BLOOD BY AUTOMATED COUNT: 19.8 % (ref 11.5–14.5)
ERYTHROCYTE [DISTWIDTH] IN BLOOD BY AUTOMATED COUNT: 20.5 % (ref 11.5–14.5)
ERYTHROCYTE [DISTWIDTH] IN BLOOD BY AUTOMATED COUNT: 20.6 % (ref 11.5–14.5)
ERYTHROCYTE [DISTWIDTH] IN BLOOD BY AUTOMATED COUNT: 20.8 % (ref 11.5–14.5)
ERYTHROCYTE [DISTWIDTH] IN BLOOD BY AUTOMATED COUNT: 21.2 % (ref 11.5–14.5)
ERYTHROCYTE [DISTWIDTH] IN BLOOD BY AUTOMATED COUNT: 21.2 % (ref 11.5–14.5)
ERYTHROCYTE [DISTWIDTH] IN BLOOD BY AUTOMATED COUNT: 21.8 % (ref 11.5–14.5)
ERYTHROCYTE [DISTWIDTH] IN BLOOD BY AUTOMATED COUNT: 22.1 % (ref 11.5–14.5)
ERYTHROCYTE [DISTWIDTH] IN BLOOD BY AUTOMATED COUNT: 22.4 % (ref 11.7–14.5)
ERYTHROCYTE [DISTWIDTH] IN BLOOD BY AUTOMATED COUNT: 23.7 % (ref 11.5–14.5)
ERYTHROCYTE [DISTWIDTH] IN BLOOD BY AUTOMATED COUNT: 24.5 % (ref 11.5–14.5)
GFR SERPL CREATININE-BSD FRML MDRD: 101 ML/MIN/1.73
GFR SERPL CREATININE-BSD FRML MDRD: 109 ML/MIN/1.73
GFR SERPL CREATININE-BSD FRML MDRD: 111 ML/MIN/1.73
GFR SERPL CREATININE-BSD FRML MDRD: 112 ML/MIN/1.73
GFR SERPL CREATININE-BSD FRML MDRD: 116 ML/MIN/1.73
GFR SERPL CREATININE-BSD FRML MDRD: 118 ML/MIN/1.73
GFR SERPL CREATININE-BSD FRML MDRD: 123 ML/MIN/1.73
GFR SERPL CREATININE-BSD FRML MDRD: 123 ML/MIN/1.73
GFR SERPL CREATININE-BSD FRML MDRD: 59 ML/MIN/1.73
GFR SERPL CREATININE-BSD FRML MDRD: 67 ML/MIN/1.73
GFR SERPL CREATININE-BSD FRML MDRD: 76 ML/MIN/1.73
GFR SERPL CREATININE-BSD FRML MDRD: 76 ML/MIN/1.73
GFR SERPL CREATININE-BSD FRML MDRD: 77 ML/MIN/1.73
GFR SERPL CREATININE-BSD FRML MDRD: 90 ML/MIN/1.73
GFR SERPL CREATININE-BSD FRML MDRD: 91 ML/MIN/1.73
GFR SERPL CREATININE-BSD FRML MDRD: 91 ML/MIN/1.73
GFR SERPL CREATININE-BSD FRML MDRD: 95 ML/MIN/1.73
GFR SERPL CREATININE-BSD FRML MDRD: 98 ML/MIN/1.73
GLOBULIN UR ELPH-MCNC: 2.1 GM/DL
GLOBULIN UR ELPH-MCNC: 2.3 GM/DL
GLOBULIN UR ELPH-MCNC: 2.3 GM/DL
GLOBULIN UR ELPH-MCNC: 2.4 GM/DL
GLOBULIN UR ELPH-MCNC: 2.5 GM/DL
GLOBULIN UR ELPH-MCNC: 2.5 GM/DL
GLOBULIN UR ELPH-MCNC: 2.6 GM/DL
GLOBULIN UR ELPH-MCNC: 2.6 GM/DL (ref 1.8–3.5)
GLOBULIN UR ELPH-MCNC: 2.7 GM/DL
GLOBULIN UR ELPH-MCNC: 2.8 GM/DL
GLOBULIN UR ELPH-MCNC: 2.8 GM/DL
GLOBULIN UR ELPH-MCNC: 2.9 GM/DL
GLOBULIN UR ELPH-MCNC: 3 GM/DL
GLOBULIN UR ELPH-MCNC: 3.1 GM/DL
GLOBULIN UR ELPH-MCNC: 3.2 GM/DL
GLOBULIN UR ELPH-MCNC: 3.2 GM/DL
GLOBULIN UR ELPH-MCNC: 3.4 GM/DL
GLUCOSE BLD-MCNC: 101 MG/DL (ref 74–124)
GLUCOSE BLD-MCNC: 102 MG/DL (ref 65–99)
GLUCOSE BLD-MCNC: 103 MG/DL (ref 65–99)
GLUCOSE BLD-MCNC: 103 MG/DL (ref 65–99)
GLUCOSE BLD-MCNC: 104 MG/DL (ref 65–99)
GLUCOSE BLD-MCNC: 106 MG/DL (ref 65–99)
GLUCOSE BLD-MCNC: 107 MG/DL (ref 65–99)
GLUCOSE BLD-MCNC: 109 MG/DL (ref 65–99)
GLUCOSE BLD-MCNC: 113 MG/DL (ref 65–99)
GLUCOSE BLD-MCNC: 113 MG/DL (ref 65–99)
GLUCOSE BLD-MCNC: 117 MG/DL (ref 65–99)
GLUCOSE BLD-MCNC: 119 MG/DL (ref 65–99)
GLUCOSE BLD-MCNC: 119 MG/DL (ref 65–99)
GLUCOSE BLD-MCNC: 126 MG/DL (ref 65–99)
GLUCOSE BLD-MCNC: 149 MG/DL (ref 65–99)
GLUCOSE BLD-MCNC: 97 MG/DL (ref 65–99)
GLUCOSE BLD-MCNC: 97 MG/DL (ref 65–99)
GLUCOSE BLD-MCNC: 98 MG/DL (ref 65–99)
GLUCOSE BLD-MCNC: 99 MG/DL (ref 65–99)
GLUCOSE UR STRIP-MCNC: NEGATIVE MG/DL
HCT VFR BLD AUTO: 25.9 % (ref 40.4–52.2)
HCT VFR BLD AUTO: 26.9 % (ref 40.4–52.2)
HCT VFR BLD AUTO: 28.2 % (ref 40.4–52.2)
HCT VFR BLD AUTO: 29.1 % (ref 40.4–52.2)
HCT VFR BLD AUTO: 29.8 % (ref 40.4–52.2)
HCT VFR BLD AUTO: 30.4 % (ref 40.4–52.2)
HCT VFR BLD AUTO: 30.4 % (ref 40.4–52.2)
HCT VFR BLD AUTO: 30.7 % (ref 40.4–52.2)
HCT VFR BLD AUTO: 30.9 % (ref 40.4–52.2)
HCT VFR BLD AUTO: 31.4 % (ref 40–49)
HCT VFR BLD AUTO: 31.5 % (ref 40.4–52.2)
HCT VFR BLD AUTO: 31.6 % (ref 40.4–52.2)
HCT VFR BLD AUTO: 31.8 % (ref 40.4–52.2)
HCT VFR BLD AUTO: 32.1 % (ref 40.4–52.2)
HCT VFR BLD AUTO: 32.2 % (ref 40.4–52.2)
HCT VFR BLD AUTO: 32.2 % (ref 40.4–52.2)
HCT VFR BLD AUTO: 32.7 % (ref 40.4–52.2)
HCT VFR BLD AUTO: 33.1 % (ref 40.4–52.2)
HCT VFR BLD AUTO: 33.5 % (ref 40.4–52.2)
HCT VFR BLD AUTO: 33.6 % (ref 40.4–52.2)
HCT VFR BLD AUTO: 33.7 % (ref 40.4–52.2)
HCT VFR BLD AUTO: 34.3 % (ref 40.4–52.2)
HCT VFR BLD AUTO: 34.6 % (ref 40.4–52.2)
HCT VFR BLD AUTO: 35 % (ref 40.4–52.2)
HGB BLD-MCNC: 10.1 G/DL (ref 13.7–17.6)
HGB BLD-MCNC: 10.2 G/DL (ref 13.7–17.6)
HGB BLD-MCNC: 10.2 G/DL (ref 13.7–17.6)
HGB BLD-MCNC: 10.3 G/DL (ref 13.7–17.6)
HGB BLD-MCNC: 10.5 G/DL (ref 13.7–17.6)
HGB BLD-MCNC: 10.6 G/DL (ref 13.5–16.5)
HGB BLD-MCNC: 10.6 G/DL (ref 13.7–17.6)
HGB BLD-MCNC: 11 G/DL (ref 13.7–17.6)
HGB BLD-MCNC: 11.1 G/DL (ref 13.7–17.6)
HGB BLD-MCNC: 11.4 G/DL (ref 13.7–17.6)
HGB BLD-MCNC: 11.5 G/DL (ref 13.7–17.6)
HGB BLD-MCNC: 11.7 G/DL (ref 13.7–17.6)
HGB BLD-MCNC: 11.7 G/DL (ref 13.7–17.6)
HGB BLD-MCNC: 8.8 G/DL (ref 13.7–17.6)
HGB BLD-MCNC: 9.1 G/DL (ref 13.7–17.6)
HGB BLD-MCNC: 9.4 G/DL (ref 13.7–17.6)
HGB BLD-MCNC: 9.5 G/DL (ref 13.7–17.6)
HGB BLD-MCNC: 9.8 G/DL (ref 13.7–17.6)
HGB UR QL STRIP.AUTO: ABNORMAL
HOLD SPECIMEN: NORMAL
HYALINE CASTS UR QL AUTO: ABNORMAL /LPF
IMM GRANULOCYTES # BLD: 0 10*3/MM3 (ref 0–0.03)
IMM GRANULOCYTES # BLD: 0.01 10*3/MM3 (ref 0–0.03)
IMM GRANULOCYTES # BLD: 0.02 10*3/MM3 (ref 0–0.03)
IMM GRANULOCYTES # BLD: 0.08 10*3/MM3 (ref 0–0.03)
IMM GRANULOCYTES # BLD: 0.1 10*3/MM3 (ref 0–0.03)
IMM GRANULOCYTES # BLD: 0.1 10*3/MM3 (ref 0–0.03)
IMM GRANULOCYTES # BLD: 0.11 10*3/MM3 (ref 0–0.03)
IMM GRANULOCYTES # BLD: 0.17 10*3/MM3 (ref 0–0.03)
IMM GRANULOCYTES NFR BLD: 0 % (ref 0–0.5)
IMM GRANULOCYTES NFR BLD: 0.2 % (ref 0–0.5)
IMM GRANULOCYTES NFR BLD: 0.3 % (ref 0–0.5)
IMM GRANULOCYTES NFR BLD: 0.4 % (ref 0–0.5)
IMM GRANULOCYTES NFR BLD: 0.5 % (ref 0–0.5)
IMM GRANULOCYTES NFR BLD: 0.5 % (ref 0–0.5)
IMM GRANULOCYTES NFR BLD: 0.6 % (ref 0–0.5)
IMM GRANULOCYTES NFR BLD: 1 % (ref 0–0.5)
IMM GRANULOCYTES NFR BLD: 1.3 % (ref 0–0.5)
IMM GRANULOCYTES NFR BLD: 1.4 % (ref 0–0.5)
IMM GRANULOCYTES NFR BLD: 2.7 % (ref 0–0.5)
INR PPP: 1.1 (ref 0.8–1.2)
KETONES UR QL STRIP: ABNORMAL
LAB AP CASE REPORT: NORMAL
LEUKOCYTE ESTERASE UR QL STRIP.AUTO: NEGATIVE
LYMPHOCYTES # BLD AUTO: 0.26 10*3/MM3 (ref 0.9–4.8)
LYMPHOCYTES # BLD AUTO: 0.39 10*3/MM3 (ref 0.9–4.8)
LYMPHOCYTES # BLD AUTO: 0.42 10*3/MM3 (ref 0.9–4.8)
LYMPHOCYTES # BLD AUTO: 0.44 10*3/MM3 (ref 0.9–4.8)
LYMPHOCYTES # BLD AUTO: 0.45 10*3/MM3 (ref 0.9–4.8)
LYMPHOCYTES # BLD AUTO: 0.45 10*3/MM3 (ref 0.9–4.8)
LYMPHOCYTES # BLD AUTO: 0.47 10*3/MM3 (ref 0.9–4.8)
LYMPHOCYTES # BLD AUTO: 0.5 10*3/MM3 (ref 0.9–4.8)
LYMPHOCYTES # BLD AUTO: 0.52 10*3/MM3 (ref 0.9–4.8)
LYMPHOCYTES # BLD AUTO: 0.53 10*3/MM3 (ref 0.9–4.8)
LYMPHOCYTES # BLD AUTO: 0.54 10*3/MM3 (ref 0.9–4.8)
LYMPHOCYTES # BLD AUTO: 0.54 10*3/MM3 (ref 1–3.5)
LYMPHOCYTES # BLD AUTO: 0.55 10*3/MM3 (ref 0.9–4.8)
LYMPHOCYTES # BLD AUTO: 0.6 10*3/MM3 (ref 0.9–4.8)
LYMPHOCYTES # BLD AUTO: 0.62 10*3/MM3 (ref 0.9–4.8)
LYMPHOCYTES # BLD AUTO: 0.65 10*3/MM3 (ref 0.9–4.8)
LYMPHOCYTES # BLD AUTO: 0.77 10*3/MM3 (ref 0.9–4.8)
LYMPHOCYTES # BLD AUTO: 0.78 10*3/MM3 (ref 0.9–4.8)
LYMPHOCYTES # BLD AUTO: 0.79 10*3/MM3 (ref 0.9–4.8)
LYMPHOCYTES # BLD AUTO: 1.09 10*3/MM3 (ref 0.9–4.8)
LYMPHOCYTES NFR BLD AUTO: 10.1 % (ref 19.6–45.3)
LYMPHOCYTES NFR BLD AUTO: 10.4 % (ref 19.6–45.3)
LYMPHOCYTES NFR BLD AUTO: 10.6 % (ref 19.6–45.3)
LYMPHOCYTES NFR BLD AUTO: 10.6 % (ref 19.6–45.3)
LYMPHOCYTES NFR BLD AUTO: 11.3 % (ref 19.6–45.3)
LYMPHOCYTES NFR BLD AUTO: 11.4 % (ref 19.6–45.3)
LYMPHOCYTES NFR BLD AUTO: 12 % (ref 19.6–45.3)
LYMPHOCYTES NFR BLD AUTO: 12.4 % (ref 19.6–45.3)
LYMPHOCYTES NFR BLD AUTO: 12.7 % (ref 19.6–45.3)
LYMPHOCYTES NFR BLD AUTO: 13.2 % (ref 19.6–45.3)
LYMPHOCYTES NFR BLD AUTO: 13.4 % (ref 19.6–45.3)
LYMPHOCYTES NFR BLD AUTO: 14 % (ref 19.6–45.3)
LYMPHOCYTES NFR BLD AUTO: 14.1 % (ref 19.6–45.3)
LYMPHOCYTES NFR BLD AUTO: 14.2 % (ref 19.6–45.3)
LYMPHOCYTES NFR BLD AUTO: 14.8 % (ref 20–49)
LYMPHOCYTES NFR BLD AUTO: 15.1 % (ref 19.6–45.3)
LYMPHOCYTES NFR BLD AUTO: 15.6 % (ref 19.6–45.3)
LYMPHOCYTES NFR BLD AUTO: 15.7 % (ref 19.6–45.3)
LYMPHOCYTES NFR BLD AUTO: 19.4 % (ref 19.6–45.3)
LYMPHOCYTES NFR BLD AUTO: 6.2 % (ref 19.6–45.3)
LYMPHOCYTES NFR BLD AUTO: 6.8 % (ref 19.6–45.3)
LYMPHOCYTES NFR BLD AUTO: 7.5 % (ref 19.6–45.3)
LYMPHOCYTES NFR BLD AUTO: 8.1 % (ref 19.6–45.3)
LYMPHOCYTES NFR BLD AUTO: 9 % (ref 19.6–45.3)
Lab: NORMAL
MACROCYTES BLD QL SMEAR: NORMAL
MAGNESIUM SERPL-MCNC: 1.9 MG/DL (ref 1.6–2.4)
MAGNESIUM SERPL-MCNC: 1.9 MG/DL (ref 1.6–2.4)
MCH RBC QN AUTO: 33.2 PG (ref 27–32.7)
MCH RBC QN AUTO: 33.2 PG (ref 27–32.7)
MCH RBC QN AUTO: 33.5 PG (ref 27–32.7)
MCH RBC QN AUTO: 33.8 PG (ref 27–32.7)
MCH RBC QN AUTO: 33.9 PG (ref 27–32.7)
MCH RBC QN AUTO: 34.1 PG (ref 27–32.7)
MCH RBC QN AUTO: 34.3 PG (ref 27–32.7)
MCH RBC QN AUTO: 34.6 PG (ref 27–32.7)
MCH RBC QN AUTO: 34.7 PG (ref 27–32.7)
MCH RBC QN AUTO: 34.8 PG (ref 27–32.7)
MCH RBC QN AUTO: 34.9 PG (ref 27–32.7)
MCH RBC QN AUTO: 35.1 PG (ref 27–32.7)
MCH RBC QN AUTO: 35.2 PG (ref 27–32.7)
MCH RBC QN AUTO: 35.5 PG (ref 27–32.7)
MCH RBC QN AUTO: 35.6 PG (ref 27–32.7)
MCH RBC QN AUTO: 36 PG (ref 27–32.7)
MCH RBC QN AUTO: 36.3 PG (ref 27–33)
MCH RBC QN AUTO: 36.4 PG (ref 27–32.7)
MCH RBC QN AUTO: 36.4 PG (ref 27–32.7)
MCH RBC QN AUTO: 37.1 PG (ref 27–32.7)
MCH RBC QN AUTO: 37.5 PG (ref 27–32.7)
MCH RBC QN AUTO: 37.9 PG (ref 27–32.7)
MCH RBC QN AUTO: 37.9 PG (ref 27–32.7)
MCH RBC QN AUTO: 38.2 PG (ref 27–32.7)
MCHC RBC AUTO-ENTMCNC: 32 G/DL (ref 32.6–36.4)
MCHC RBC AUTO-ENTMCNC: 32.6 G/DL (ref 32.6–36.4)
MCHC RBC AUTO-ENTMCNC: 32.7 G/DL (ref 32.6–36.4)
MCHC RBC AUTO-ENTMCNC: 32.9 G/DL (ref 32.6–36.4)
MCHC RBC AUTO-ENTMCNC: 33 G/DL (ref 32.6–36.4)
MCHC RBC AUTO-ENTMCNC: 33.1 G/DL (ref 32.6–36.4)
MCHC RBC AUTO-ENTMCNC: 33.2 G/DL (ref 32.6–36.4)
MCHC RBC AUTO-ENTMCNC: 33.2 G/DL (ref 32.6–36.4)
MCHC RBC AUTO-ENTMCNC: 33.3 G/DL (ref 32.6–36.4)
MCHC RBC AUTO-ENTMCNC: 33.4 G/DL (ref 32.6–36.4)
MCHC RBC AUTO-ENTMCNC: 33.5 G/DL (ref 32.6–36.4)
MCHC RBC AUTO-ENTMCNC: 33.6 G/DL (ref 32.6–36.4)
MCHC RBC AUTO-ENTMCNC: 33.6 G/DL (ref 32.6–36.4)
MCHC RBC AUTO-ENTMCNC: 33.8 G/DL (ref 32.6–36.4)
MCHC RBC AUTO-ENTMCNC: 33.8 G/DL (ref 32.6–36.4)
MCHC RBC AUTO-ENTMCNC: 33.8 G/DL (ref 32–35)
MCHC RBC AUTO-ENTMCNC: 33.9 G/DL (ref 32.6–36.4)
MCHC RBC AUTO-ENTMCNC: 34 G/DL (ref 32.6–36.4)
MCHC RBC AUTO-ENTMCNC: 34 G/DL (ref 32.6–36.4)
MCHC RBC AUTO-ENTMCNC: 34.1 G/DL (ref 32.6–36.4)
MCV RBC AUTO: 101.7 FL (ref 79.8–96.2)
MCV RBC AUTO: 102 FL (ref 79.8–96.2)
MCV RBC AUTO: 102.4 FL (ref 79.8–96.2)
MCV RBC AUTO: 102.7 FL (ref 79.8–96.2)
MCV RBC AUTO: 102.8 FL (ref 79.8–96.2)
MCV RBC AUTO: 103 FL (ref 79.8–96.2)
MCV RBC AUTO: 103.9 FL (ref 79.8–96.2)
MCV RBC AUTO: 103.9 FL (ref 79.8–96.2)
MCV RBC AUTO: 104.1 FL (ref 79.8–96.2)
MCV RBC AUTO: 104.2 FL (ref 79.8–96.2)
MCV RBC AUTO: 104.3 FL (ref 79.8–96.2)
MCV RBC AUTO: 104.9 FL (ref 79.8–96.2)
MCV RBC AUTO: 105 FL (ref 79.8–96.2)
MCV RBC AUTO: 106 FL (ref 79.8–96.2)
MCV RBC AUTO: 106.1 FL (ref 79.8–96.2)
MCV RBC AUTO: 106.2 FL (ref 79.8–96.2)
MCV RBC AUTO: 107.5 FL (ref 83–97)
MCV RBC AUTO: 108.5 FL (ref 79.8–96.2)
MCV RBC AUTO: 108.6 FL (ref 79.8–96.2)
MCV RBC AUTO: 109.8 FL (ref 79.8–96.2)
MCV RBC AUTO: 111.6 FL (ref 79.8–96.2)
MCV RBC AUTO: 112.4 FL (ref 79.8–96.2)
MCV RBC AUTO: 113.7 FL (ref 79.8–96.2)
MCV RBC AUTO: 114.6 FL (ref 79.8–96.2)
MONOCYTES # BLD AUTO: 0.37 10*3/MM3 (ref 0.2–1.2)
MONOCYTES # BLD AUTO: 0.4 10*3/MM3 (ref 0.2–1.2)
MONOCYTES # BLD AUTO: 0.42 10*3/MM3 (ref 0.2–1.2)
MONOCYTES # BLD AUTO: 0.42 10*3/MM3 (ref 0.2–1.2)
MONOCYTES # BLD AUTO: 0.45 10*3/MM3 (ref 0.2–1.2)
MONOCYTES # BLD AUTO: 0.46 10*3/MM3 (ref 0.25–0.8)
MONOCYTES # BLD AUTO: 0.46 10*3/MM3 (ref 0.2–1.2)
MONOCYTES # BLD AUTO: 0.49 10*3/MM3 (ref 0.2–1.2)
MONOCYTES # BLD AUTO: 0.49 10*3/MM3 (ref 0.2–1.2)
MONOCYTES # BLD AUTO: 0.5 10*3/MM3 (ref 0.2–1.2)
MONOCYTES # BLD AUTO: 0.59 10*3/MM3 (ref 0.2–1.2)
MONOCYTES # BLD AUTO: 0.61 10*3/MM3 (ref 0.2–1.2)
MONOCYTES # BLD AUTO: 0.63 10*3/MM3 (ref 0.2–1.2)
MONOCYTES # BLD AUTO: 0.65 10*3/MM3 (ref 0.2–1.2)
MONOCYTES # BLD AUTO: 0.65 10*3/MM3 (ref 0.2–1.2)
MONOCYTES # BLD AUTO: 0.7 10*3/MM3 (ref 0.2–1.2)
MONOCYTES # BLD AUTO: 0.71 10*3/MM3 (ref 0.2–1.2)
MONOCYTES # BLD AUTO: 0.71 10*3/MM3 (ref 0.2–1.2)
MONOCYTES # BLD AUTO: 0.74 10*3/MM3 (ref 0.2–1.2)
MONOCYTES # BLD AUTO: 0.9 10*3/MM3 (ref 0.2–1.2)
MONOCYTES # BLD AUTO: 0.99 10*3/MM3 (ref 0.2–1.2)
MONOCYTES # BLD AUTO: 1.07 10*3/MM3 (ref 0.2–1.2)
MONOCYTES NFR BLD AUTO: 10.5 % (ref 5–12)
MONOCYTES NFR BLD AUTO: 10.6 % (ref 5–12)
MONOCYTES NFR BLD AUTO: 10.7 % (ref 5–12)
MONOCYTES NFR BLD AUTO: 10.8 % (ref 5–12)
MONOCYTES NFR BLD AUTO: 12.4 % (ref 5–12)
MONOCYTES NFR BLD AUTO: 12.6 % (ref 4–12)
MONOCYTES NFR BLD AUTO: 12.8 % (ref 5–12)
MONOCYTES NFR BLD AUTO: 13.5 % (ref 5–12)
MONOCYTES NFR BLD AUTO: 13.8 % (ref 5–12)
MONOCYTES NFR BLD AUTO: 14.6 % (ref 5–12)
MONOCYTES NFR BLD AUTO: 15.1 % (ref 5–12)
MONOCYTES NFR BLD AUTO: 15.4 % (ref 5–12)
MONOCYTES NFR BLD AUTO: 15.6 % (ref 5–12)
MONOCYTES NFR BLD AUTO: 15.9 % (ref 5–12)
MONOCYTES NFR BLD AUTO: 16 % (ref 5–12)
MONOCYTES NFR BLD AUTO: 17 % (ref 5–12)
MONOCYTES NFR BLD AUTO: 18.1 % (ref 5–12)
MONOCYTES NFR BLD AUTO: 18.4 % (ref 5–12)
MONOCYTES NFR BLD AUTO: 18.8 % (ref 5–12)
MONOCYTES NFR BLD AUTO: 5.8 % (ref 5–12)
MONOCYTES NFR BLD AUTO: 8.2 % (ref 5–12)
MONOCYTES NFR BLD AUTO: 9.3 % (ref 5–12)
MONOCYTES NFR BLD AUTO: 9.5 % (ref 5–12)
MONOCYTES NFR BLD AUTO: 9.8 % (ref 5–12)
NEUTROPHILS # BLD AUTO: 1.12 10*3/MM3 (ref 1.9–8.1)
NEUTROPHILS # BLD AUTO: 1.75 10*3/MM3 (ref 1.9–8.1)
NEUTROPHILS # BLD AUTO: 1.82 10*3/MM3 (ref 1.9–8.1)
NEUTROPHILS # BLD AUTO: 1.92 10*3/MM3 (ref 1.9–8.1)
NEUTROPHILS # BLD AUTO: 10.84 10*3/MM3 (ref 1.9–8.1)
NEUTROPHILS # BLD AUTO: 2.07 10*3/MM3 (ref 1.9–8.1)
NEUTROPHILS # BLD AUTO: 2.21 10*3/MM3 (ref 1.9–8.1)
NEUTROPHILS # BLD AUTO: 2.26 10*3/MM3 (ref 1.9–8.1)
NEUTROPHILS # BLD AUTO: 2.29 10*3/MM3 (ref 1.5–7)
NEUTROPHILS # BLD AUTO: 2.42 10*3/MM3 (ref 1.9–8.1)
NEUTROPHILS # BLD AUTO: 2.7 10*3/MM3 (ref 1.9–8.1)
NEUTROPHILS # BLD AUTO: 2.83 10*3/MM3 (ref 1.9–8.1)
NEUTROPHILS # BLD AUTO: 2.87 10*3/MM3 (ref 1.9–8.1)
NEUTROPHILS # BLD AUTO: 3.04 10*3/MM3 (ref 1.9–8.1)
NEUTROPHILS # BLD AUTO: 3.06 10*3/MM3 (ref 1.9–8.1)
NEUTROPHILS # BLD AUTO: 3.17 10*3/MM3 (ref 1.9–8.1)
NEUTROPHILS # BLD AUTO: 3.2 10*3/MM3 (ref 1.9–8.1)
NEUTROPHILS # BLD AUTO: 3.22 10*3/MM3 (ref 1.9–8.1)
NEUTROPHILS # BLD AUTO: 3.74 10*3/MM3 (ref 1.9–8.1)
NEUTROPHILS # BLD AUTO: 4.29 10*3/MM3 (ref 1.9–8.1)
NEUTROPHILS # BLD AUTO: 4.4 10*3/MM3 (ref 1.9–8.1)
NEUTROPHILS # BLD AUTO: 6.38 10*3/MM3 (ref 1.9–8.1)
NEUTROPHILS # BLD AUTO: 7.3 10*3/MM3 (ref 1.9–8.1)
NEUTROPHILS # BLD AUTO: 9.37 10*3/MM3 (ref 1.9–8.1)
NEUTROPHILS NFR BLD AUTO: 51.6 % (ref 42.7–76)
NEUTROPHILS NFR BLD AUTO: 51.9 % (ref 42.7–76)
NEUTROPHILS NFR BLD AUTO: 56.7 % (ref 42.7–76)
NEUTROPHILS NFR BLD AUTO: 59.5 % (ref 42.7–76)
NEUTROPHILS NFR BLD AUTO: 62.6 % (ref 39–75)
NEUTROPHILS NFR BLD AUTO: 63.1 % (ref 42.7–76)
NEUTROPHILS NFR BLD AUTO: 64.5 % (ref 42.7–76)
NEUTROPHILS NFR BLD AUTO: 64.5 % (ref 42.7–76)
NEUTROPHILS NFR BLD AUTO: 67.1 % (ref 42.7–76)
NEUTROPHILS NFR BLD AUTO: 67.2 % (ref 42.7–76)
NEUTROPHILS NFR BLD AUTO: 67.3 % (ref 42.7–76)
NEUTROPHILS NFR BLD AUTO: 68.2 % (ref 42.7–76)
NEUTROPHILS NFR BLD AUTO: 68.8 % (ref 42.7–76)
NEUTROPHILS NFR BLD AUTO: 70.2 % (ref 42.7–76)
NEUTROPHILS NFR BLD AUTO: 70.8 % (ref 42.7–76)
NEUTROPHILS NFR BLD AUTO: 70.9 % (ref 42.7–76)
NEUTROPHILS NFR BLD AUTO: 72.4 % (ref 42.7–76)
NEUTROPHILS NFR BLD AUTO: 73.1 % (ref 42.7–76)
NEUTROPHILS NFR BLD AUTO: 73.9 % (ref 42.7–76)
NEUTROPHILS NFR BLD AUTO: 75.7 % (ref 42.7–76)
NEUTROPHILS NFR BLD AUTO: 76.5 % (ref 42.7–76)
NEUTROPHILS NFR BLD AUTO: 77.3 % (ref 42.7–76)
NEUTROPHILS NFR BLD AUTO: 79.1 % (ref 42.7–76)
NEUTROPHILS NFR BLD AUTO: 85.3 % (ref 42.7–76)
NITRITE UR QL STRIP: NEGATIVE
NRBC BLD MANUAL-RTO: 0 /100 WBC (ref 0–0)
OVALOCYTES BLD QL SMEAR: NORMAL
PATH REPORT.FINAL DX SPEC: NORMAL
PATH REPORT.GROSS SPEC: NORMAL
PH UR STRIP.AUTO: 7.5 [PH] (ref 5–8)
PLAT MORPH BLD: NORMAL
PLATELET # BLD AUTO: 101 10*3/MM3 (ref 140–500)
PLATELET # BLD AUTO: 105 10*3/MM3 (ref 140–500)
PLATELET # BLD AUTO: 105 10*3/MM3 (ref 140–500)
PLATELET # BLD AUTO: 109 10*3/MM3 (ref 140–500)
PLATELET # BLD AUTO: 110 10*3/MM3 (ref 140–500)
PLATELET # BLD AUTO: 114 10*3/MM3 (ref 140–500)
PLATELET # BLD AUTO: 116 10*3/MM3 (ref 140–500)
PLATELET # BLD AUTO: 124 10*3/MM3 (ref 140–500)
PLATELET # BLD AUTO: 174 10*3/MM3 (ref 140–500)
PLATELET # BLD AUTO: 181 10*3/MM3 (ref 140–500)
PLATELET # BLD AUTO: 202 10*3/MM3 (ref 140–500)
PLATELET # BLD AUTO: 203 10*3/MM3 (ref 140–500)
PLATELET # BLD AUTO: 215 10*3/MM3 (ref 140–500)
PLATELET # BLD AUTO: 260 10*3/MM3 (ref 140–500)
PLATELET # BLD AUTO: 61 10*3/MM3 (ref 140–500)
PLATELET # BLD AUTO: 75 10*3/MM3 (ref 140–500)
PLATELET # BLD AUTO: 82 10*3/MM3 (ref 140–500)
PLATELET # BLD AUTO: 84 10*3/MM3 (ref 140–500)
PLATELET # BLD AUTO: 86 10*3/MM3 (ref 140–500)
PLATELET # BLD AUTO: 87 10*3/MM3 (ref 140–500)
PLATELET # BLD AUTO: 94 10*3/MM3 (ref 140–500)
PLATELET # BLD AUTO: 94 10*3/MM3 (ref 150–375)
PMV BLD AUTO: 11.9 FL (ref 6–12)
PMV BLD AUTO: 11.9 FL (ref 6–12)
PMV BLD AUTO: 12 FL (ref 6–12)
PMV BLD AUTO: 12.1 FL (ref 6–12)
PMV BLD AUTO: 12.1 FL (ref 6–12)
PMV BLD AUTO: 12.2 FL (ref 6–12)
PMV BLD AUTO: 12.2 FL (ref 6–12)
PMV BLD AUTO: 12.3 FL (ref 6–12)
PMV BLD AUTO: 12.3 FL (ref 6–12)
PMV BLD AUTO: 12.3 FL (ref 8.9–12.1)
PMV BLD AUTO: 12.4 FL (ref 6–12)
PMV BLD AUTO: 12.4 FL (ref 6–12)
PMV BLD AUTO: 12.6 FL (ref 6–12)
PMV BLD AUTO: 12.7 FL (ref 6–12)
PMV BLD AUTO: 12.7 FL (ref 6–12)
PMV BLD AUTO: 12.8 FL (ref 6–12)
PMV BLD AUTO: 12.9 FL (ref 6–12)
PMV BLD AUTO: 13.1 FL (ref 6–12)
PMV BLD AUTO: 13.1 FL (ref 6–12)
PMV BLD AUTO: 13.2 FL (ref 6–12)
PMV BLD AUTO: 13.2 FL (ref 6–12)
PMV BLD AUTO: 13.4 FL (ref 6–12)
POTASSIUM BLD-SCNC: 3.2 MMOL/L (ref 3.5–5.2)
POTASSIUM BLD-SCNC: 3.3 MMOL/L (ref 3.5–5.2)
POTASSIUM BLD-SCNC: 3.3 MMOL/L (ref 3.5–5.2)
POTASSIUM BLD-SCNC: 3.4 MMOL/L (ref 3.5–5.2)
POTASSIUM BLD-SCNC: 3.5 MMOL/L (ref 3.5–5.2)
POTASSIUM BLD-SCNC: 3.6 MMOL/L (ref 3.5–5.2)
POTASSIUM BLD-SCNC: 3.6 MMOL/L (ref 3.5–5.2)
POTASSIUM BLD-SCNC: 3.7 MMOL/L (ref 3.5–5.2)
POTASSIUM BLD-SCNC: 3.8 MMOL/L (ref 3.5–5.2)
POTASSIUM BLD-SCNC: 3.9 MMOL/L (ref 3.5–5.2)
POTASSIUM BLD-SCNC: 4 MMOL/L (ref 3.5–5.2)
POTASSIUM BLD-SCNC: 4 MMOL/L (ref 3.5–5.2)
POTASSIUM BLD-SCNC: 4.1 MMOL/L (ref 3.5–4.7)
POTASSIUM BLD-SCNC: 4.1 MMOL/L (ref 3.5–5.2)
POTASSIUM BLD-SCNC: 4.1 MMOL/L (ref 3.5–5.2)
POTASSIUM BLD-SCNC: 4.2 MMOL/L (ref 3.5–5.2)
PROT SERPL-MCNC: 5.2 G/DL (ref 6–8.5)
PROT SERPL-MCNC: 5.2 G/DL (ref 6–8.5)
PROT SERPL-MCNC: 5.6 G/DL (ref 6–8.5)
PROT SERPL-MCNC: 5.6 G/DL (ref 6–8.5)
PROT SERPL-MCNC: 5.7 G/DL (ref 6–8.5)
PROT SERPL-MCNC: 5.7 G/DL (ref 6–8.5)
PROT SERPL-MCNC: 5.8 G/DL (ref 6–8.5)
PROT SERPL-MCNC: 5.9 G/DL (ref 6–8.5)
PROT SERPL-MCNC: 5.9 G/DL (ref 6–8.5)
PROT SERPL-MCNC: 6 G/DL (ref 6–8.5)
PROT SERPL-MCNC: 6.1 G/DL (ref 6.3–8)
PROT SERPL-MCNC: 6.1 G/DL (ref 6–8.5)
PROT SERPL-MCNC: 6.2 G/DL (ref 6–8.5)
PROT SERPL-MCNC: 6.2 G/DL (ref 6–8.5)
PROT SERPL-MCNC: 6.3 G/DL (ref 6–8.5)
PROT SERPL-MCNC: 6.4 G/DL (ref 6–8.5)
PROT UR QL STRIP: NEGATIVE
PROTHROMBIN TIME: 12.8 SECONDS (ref 12.8–15.2)
RBC # BLD AUTO: 2.32 10*6/MM3 (ref 4.6–6)
RBC # BLD AUTO: 2.45 10*6/MM3 (ref 4.6–6)
RBC # BLD AUTO: 2.46 10*6/MM3 (ref 4.6–6)
RBC # BLD AUTO: 2.77 10*6/MM3 (ref 4.6–6)
RBC # BLD AUTO: 2.8 10*6/MM3 (ref 4.6–6)
RBC # BLD AUTO: 2.83 10*6/MM3 (ref 4.6–6)
RBC # BLD AUTO: 2.86 10*6/MM3 (ref 4.6–6)
RBC # BLD AUTO: 2.9 10*6/MM3 (ref 4.6–6)
RBC # BLD AUTO: 2.92 10*6/MM3 (ref 4.3–5.5)
RBC # BLD AUTO: 2.92 10*6/MM3 (ref 4.6–6)
RBC # BLD AUTO: 2.98 10*6/MM3 (ref 4.6–6)
RBC # BLD AUTO: 2.98 10*6/MM3 (ref 4.6–6)
RBC # BLD AUTO: 3.03 10*6/MM3 (ref 4.6–6)
RBC # BLD AUTO: 3.05 10*6/MM3 (ref 4.6–6)
RBC # BLD AUTO: 3.06 10*6/MM3 (ref 4.6–6)
RBC # BLD AUTO: 3.08 10*6/MM3 (ref 4.6–6)
RBC # BLD AUTO: 3.09 10*6/MM3 (ref 4.6–6)
RBC # BLD AUTO: 3.1 10*6/MM3 (ref 4.6–6)
RBC # BLD AUTO: 3.19 10*6/MM3 (ref 4.6–6)
RBC # BLD AUTO: 3.23 10*6/MM3 (ref 4.6–6)
RBC # BLD AUTO: 3.28 10*6/MM3 (ref 4.6–6)
RBC # BLD AUTO: 3.3 10*6/MM3 (ref 4.6–6)
RBC # BLD AUTO: 3.33 10*6/MM3 (ref 4.6–6)
RBC # BLD AUTO: 3.34 10*6/MM3 (ref 4.6–6)
RBC # UR: ABNORMAL /HPF
REF LAB TEST METHOD: ABNORMAL
REF LAB TEST RESULTS: NORMAL
SMALL PLATELETS BLD QL SMEAR: NORMAL
SODIUM BLD-SCNC: 136 MMOL/L (ref 136–145)
SODIUM BLD-SCNC: 136 MMOL/L (ref 136–145)
SODIUM BLD-SCNC: 137 MMOL/L (ref 136–145)
SODIUM BLD-SCNC: 138 MMOL/L (ref 136–145)
SODIUM BLD-SCNC: 138 MMOL/L (ref 136–145)
SODIUM BLD-SCNC: 140 MMOL/L (ref 134–145)
SODIUM BLD-SCNC: 140 MMOL/L (ref 136–145)
SODIUM BLD-SCNC: 141 MMOL/L (ref 136–145)
SODIUM BLD-SCNC: 142 MMOL/L (ref 136–145)
SODIUM BLD-SCNC: 143 MMOL/L (ref 136–145)
SP GR UR STRIP: 1.01 (ref 1–1.03)
SPHEROCYTES BLD QL SMEAR: NORMAL
SQUAMOUS #/AREA URNS HPF: ABNORMAL /HPF
STOMATOCYTES BLD QL SMEAR: NORMAL
UROBILINOGEN UR QL STRIP: ABNORMAL
WBC MORPH BLD: NORMAL
WBC NRBC COR # BLD: 12.12 10*3/MM3 (ref 4.5–10.7)
WBC NRBC COR # BLD: 12.71 10*3/MM3 (ref 4.5–10.7)
WBC NRBC COR # BLD: 2.17 10*3/MM3 (ref 4.5–10.7)
WBC NRBC COR # BLD: 2.98 10*3/MM3 (ref 4.5–10.7)
WBC NRBC COR # BLD: 3.08 10*3/MM3 (ref 4.5–10.7)
WBC NRBC COR # BLD: 3.21 10*3/MM3 (ref 4.5–10.7)
WBC NRBC COR # BLD: 3.37 10*3/MM3 (ref 4.5–10.7)
WBC NRBC COR # BLD: 3.5 10*3/MM3 (ref 4.5–10.7)
WBC NRBC COR # BLD: 3.66 10*3/MM3 (ref 4–10)
WBC NRBC COR # BLD: 3.72 10*3/MM3 (ref 4.5–10.7)
WBC NRBC COR # BLD: 3.83 10*3/MM3 (ref 4.5–10.7)
WBC NRBC COR # BLD: 3.84 10*3/MM3 (ref 4.5–10.7)
WBC NRBC COR # BLD: 4.01 10*3/MM3 (ref 4.5–10.7)
WBC NRBC COR # BLD: 4.15 10*3/MM3 (ref 4.5–10.7)
WBC NRBC COR # BLD: 4.16 10*3/MM3 (ref 4.5–10.7)
WBC NRBC COR # BLD: 4.28 10*3/MM3 (ref 4.5–10.7)
WBC NRBC COR # BLD: 4.44 10*3/MM3 (ref 4.5–10.7)
WBC NRBC COR # BLD: 4.45 10*3/MM3 (ref 4.5–10.7)
WBC NRBC COR # BLD: 4.56 10*3/MM3 (ref 4.5–10.7)
WBC NRBC COR # BLD: 5.27 10*3/MM3 (ref 4.5–10.7)
WBC NRBC COR # BLD: 5.87 10*3/MM3 (ref 4.5–10.7)
WBC NRBC COR # BLD: 6.21 10*3/MM3 (ref 4.5–10.7)
WBC NRBC COR # BLD: 8.63 10*3/MM3 (ref 4.5–10.7)
WBC NRBC COR # BLD: 9.65 10*3/MM3 (ref 4.5–10.7)
WBC UR QL AUTO: ABNORMAL /HPF

## 2017-01-01 PROCEDURE — 85007 BL SMEAR W/DIFF WBC COUNT: CPT | Performed by: INTERNAL MEDICINE

## 2017-01-01 PROCEDURE — 80053 COMPREHEN METABOLIC PANEL: CPT | Performed by: INTERNAL MEDICINE

## 2017-01-01 PROCEDURE — 96372 THER/PROPH/DIAG INJ SC/IM: CPT | Performed by: INTERNAL MEDICINE

## 2017-01-01 PROCEDURE — 25010000002 HEPARIN FLUSH (PORCINE) 100 UNIT/ML SOLUTION

## 2017-01-01 PROCEDURE — 96361 HYDRATE IV INFUSION ADD-ON: CPT | Performed by: INTERNAL MEDICINE

## 2017-01-01 PROCEDURE — 25010000002 PALONOSETRON PER 25 MCG: Performed by: NURSE PRACTITIONER

## 2017-01-01 PROCEDURE — C1769 GUIDE WIRE: HCPCS | Performed by: UROLOGY

## 2017-01-01 PROCEDURE — 99284 EMERGENCY DEPT VISIT MOD MDM: CPT

## 2017-01-01 PROCEDURE — 25010000002 OCTREOTIDE PER 25 MCG: Performed by: INTERNAL MEDICINE

## 2017-01-01 PROCEDURE — 25010000002 LEUCOVORIN 200 MG RECONSTITUTED SOLUTION 1 EACH VIAL: Performed by: INTERNAL MEDICINE

## 2017-01-01 PROCEDURE — 0 DIATRIZOATE MEGLUMINE & SODIUM PER 1 ML: Performed by: INTERNAL MEDICINE

## 2017-01-01 PROCEDURE — 36415 COLL VENOUS BLD VENIPUNCTURE: CPT | Performed by: INTERNAL MEDICINE

## 2017-01-01 PROCEDURE — 25010000003 POTASSIUM CHLORIDE 10 MEQ/100ML SOLUTION: Performed by: INTERNAL MEDICINE

## 2017-01-01 PROCEDURE — 25010000002 PNEUMOCOCCAL VAC POLYVALENT PER 0.5 ML: Performed by: INTERNAL MEDICINE

## 2017-01-01 PROCEDURE — 25010000002 HEPARIN FLUSH (PORCINE) 100 UNIT/ML SOLUTION: Performed by: INTERNAL MEDICINE

## 2017-01-01 PROCEDURE — 96375 TX/PRO/DX INJ NEW DRUG ADDON: CPT | Performed by: INTERNAL MEDICINE

## 2017-01-01 PROCEDURE — 85025 COMPLETE CBC W/AUTO DIFF WBC: CPT | Performed by: INTERNAL MEDICINE

## 2017-01-01 PROCEDURE — 96366 THER/PROPH/DIAG IV INF ADDON: CPT | Performed by: INTERNAL MEDICINE

## 2017-01-01 PROCEDURE — 25010000002 DEXAMETHASONE SODIUM PHOSPHATE 10 MG/ML SOLUTION 1 ML VIAL: Performed by: INTERNAL MEDICINE

## 2017-01-01 PROCEDURE — 25010000002 PALONOSETRON PER 25 MCG: Performed by: INTERNAL MEDICINE

## 2017-01-01 PROCEDURE — 25010000002 FLUOROURACIL PER 500 MG: Performed by: INTERNAL MEDICINE

## 2017-01-01 PROCEDURE — 25010000002 OCTREOTIDE PER 25 MCG: Performed by: NURSE PRACTITIONER

## 2017-01-01 PROCEDURE — 99214 OFFICE O/P EST MOD 30 MIN: CPT | Performed by: INTERNAL MEDICINE

## 2017-01-01 PROCEDURE — 96523 IRRIG DRUG DELIVERY DEVICE: CPT | Performed by: INTERNAL MEDICINE

## 2017-01-01 PROCEDURE — 96374 THER/PROPH/DIAG INJ IV PUSH: CPT

## 2017-01-01 PROCEDURE — 99214 OFFICE O/P EST MOD 30 MIN: CPT | Performed by: NURSE PRACTITIONER

## 2017-01-01 PROCEDURE — 25010000002 LEUCOVORIN CALCIUM PER 50 MG: Performed by: INTERNAL MEDICINE

## 2017-01-01 PROCEDURE — 25010000002 OXALIPLATIN PER 0.5 MG: Performed by: INTERNAL MEDICINE

## 2017-01-01 PROCEDURE — 96375 TX/PRO/DX INJ NEW DRUG ADDON: CPT

## 2017-01-01 PROCEDURE — 96413 CHEMO IV INFUSION 1 HR: CPT | Performed by: NURSE PRACTITIONER

## 2017-01-01 PROCEDURE — 99213 OFFICE O/P EST LOW 20 MIN: CPT | Performed by: NURSE PRACTITIONER

## 2017-01-01 PROCEDURE — 96367 TX/PROPH/DG ADDL SEQ IV INF: CPT

## 2017-01-01 PROCEDURE — 96415 CHEMO IV INFUSION ADDL HR: CPT | Performed by: INTERNAL MEDICINE

## 2017-01-01 PROCEDURE — 74176 CT ABD & PELVIS W/O CONTRAST: CPT

## 2017-01-01 PROCEDURE — 96375 TX/PRO/DX INJ NEW DRUG ADDON: CPT | Performed by: NURSE PRACTITIONER

## 2017-01-01 PROCEDURE — 25010000002 OXALIPLATIN PER 0.5 MG: Performed by: NURSE PRACTITIONER

## 2017-01-01 PROCEDURE — 81001 URINALYSIS AUTO W/SCOPE: CPT | Performed by: EMERGENCY MEDICINE

## 2017-01-01 PROCEDURE — 96415 CHEMO IV INFUSION ADDL HR: CPT | Performed by: NURSE PRACTITIONER

## 2017-01-01 PROCEDURE — G0008 ADMIN INFLUENZA VIRUS VAC: HCPCS | Performed by: INTERNAL MEDICINE

## 2017-01-01 PROCEDURE — 85025 COMPLETE CBC W/AUTO DIFF WBC: CPT

## 2017-01-01 PROCEDURE — 86301 IMMUNOASSAY TUMOR CA 19-9: CPT | Performed by: NURSE PRACTITIONER

## 2017-01-01 PROCEDURE — G0378 HOSPITAL OBSERVATION PER HR: HCPCS

## 2017-01-01 PROCEDURE — 96409 CHEMO IV PUSH SNGL DRUG: CPT | Performed by: INTERNAL MEDICINE

## 2017-01-01 PROCEDURE — 82565 ASSAY OF CREATININE: CPT

## 2017-01-01 PROCEDURE — 96367 TX/PROPH/DG ADDL SEQ IV INF: CPT | Performed by: INTERNAL MEDICINE

## 2017-01-01 PROCEDURE — 25010000002 FOSAPREPITANT PER 1 MG: Performed by: INTERNAL MEDICINE

## 2017-01-01 PROCEDURE — G0009 ADMIN PNEUMOCOCCAL VACCINE: HCPCS | Performed by: INTERNAL MEDICINE

## 2017-01-01 PROCEDURE — 0 IOTHALAMATE 60 % SOLUTION: Performed by: UROLOGY

## 2017-01-01 PROCEDURE — 85025 COMPLETE CBC W/AUTO DIFF WBC: CPT | Performed by: EMERGENCY MEDICINE

## 2017-01-01 PROCEDURE — 96360 HYDRATION IV INFUSION INIT: CPT | Performed by: INTERNAL MEDICINE

## 2017-01-01 PROCEDURE — 96360 HYDRATION IV INFUSION INIT: CPT | Performed by: NURSE PRACTITIONER

## 2017-01-01 PROCEDURE — 25010000002 KETOROLAC TROMETHAMINE PER 15 MG: Performed by: EMERGENCY MEDICINE

## 2017-01-01 PROCEDURE — 25010000002 HYDROMORPHONE PER 4 MG: Performed by: EMERGENCY MEDICINE

## 2017-01-01 PROCEDURE — 86301 IMMUNOASSAY TUMOR CA 19-9: CPT | Performed by: INTERNAL MEDICINE

## 2017-01-01 PROCEDURE — 25010000002 ONDANSETRON PER 1 MG: Performed by: EMERGENCY MEDICINE

## 2017-01-01 PROCEDURE — 96361 HYDRATE IV INFUSION ADD-ON: CPT | Performed by: NURSE PRACTITIONER

## 2017-01-01 PROCEDURE — 25010000002 DEXAMETHASONE PER 1 MG: Performed by: INTERNAL MEDICINE

## 2017-01-01 PROCEDURE — C1758 CATHETER, URETERAL: HCPCS | Performed by: UROLOGY

## 2017-01-01 PROCEDURE — 0 IOPAMIDOL 61 % SOLUTION: Performed by: INTERNAL MEDICINE

## 2017-01-01 PROCEDURE — 99212-NC PR NO CHARGE CBC OFFICE OUTPATIENT VISIT 10 MINUTES: Performed by: NURSE PRACTITIONER

## 2017-01-01 PROCEDURE — 96413 CHEMO IV INFUSION 1 HR: CPT | Performed by: INTERNAL MEDICINE

## 2017-01-01 PROCEDURE — 96361 HYDRATE IV INFUSION ADD-ON: CPT

## 2017-01-01 PROCEDURE — 94799 UNLISTED PULMONARY SVC/PX: CPT

## 2017-01-01 PROCEDURE — 96367 TX/PROPH/DG ADDL SEQ IV INF: CPT | Performed by: NURSE PRACTITIONER

## 2017-01-01 PROCEDURE — 25010000002 ONDANSETRON PER 1 MG: Performed by: HOSPITALIST

## 2017-01-01 PROCEDURE — 90661 CCIIV3 VAC ABX FR 0.5 ML IM: CPT | Performed by: INTERNAL MEDICINE

## 2017-01-01 PROCEDURE — 96523 IRRIG DRUG DELIVERY DEVICE: CPT | Performed by: NURSE PRACTITIONER

## 2017-01-01 PROCEDURE — 96366 THER/PROPH/DIAG IV INF ADDON: CPT

## 2017-01-01 PROCEDURE — 96365 THER/PROPH/DIAG IV INF INIT: CPT | Performed by: INTERNAL MEDICINE

## 2017-01-01 PROCEDURE — 90732 PPSV23 VACC 2 YRS+ SUBQ/IM: CPT | Performed by: INTERNAL MEDICINE

## 2017-01-01 PROCEDURE — 80053 COMPREHEN METABOLIC PANEL: CPT | Performed by: EMERGENCY MEDICINE

## 2017-01-01 PROCEDURE — 25010000002 CEFTRIAXONE PER 250 MG: Performed by: UROLOGY

## 2017-01-01 PROCEDURE — 25010000002 IRINOTECAN LIPOSOME 43 MG/10ML INJECTION 10 ML VIAL: Performed by: INTERNAL MEDICINE

## 2017-01-01 PROCEDURE — 25010000002 DEXAMETHASONE SODIUM PHOSPHATE 10 MG/ML SOLUTION 1 ML VIAL: Performed by: NURSE PRACTITIONER

## 2017-01-01 PROCEDURE — 83735 ASSAY OF MAGNESIUM: CPT | Performed by: INTERNAL MEDICINE

## 2017-01-01 PROCEDURE — 25010000002 MIDAZOLAM PER 1 MG: Performed by: ANESTHESIOLOGY

## 2017-01-01 PROCEDURE — 99213 OFFICE O/P EST LOW 20 MIN: CPT | Performed by: INTERNAL MEDICINE

## 2017-01-01 PROCEDURE — 96411 CHEMO IV PUSH ADDL DRUG: CPT | Performed by: INTERNAL MEDICINE

## 2017-01-01 PROCEDURE — C2617 STENT, NON-COR, TEM W/O DEL: HCPCS | Performed by: UROLOGY

## 2017-01-01 PROCEDURE — 25010000002 FENTANYL CITRATE (PF) 100 MCG/2ML SOLUTION: Performed by: ANESTHESIOLOGY

## 2017-01-01 PROCEDURE — 96376 TX/PRO/DX INJ SAME DRUG ADON: CPT

## 2017-01-01 PROCEDURE — 76700 US EXAM ABDOM COMPLETE: CPT

## 2017-01-01 PROCEDURE — 25010000002 DEXAMETHASONE PER 1 MG: Performed by: NURSE PRACTITIONER

## 2017-01-01 PROCEDURE — 80048 BASIC METABOLIC PNL TOTAL CA: CPT | Performed by: INTERNAL MEDICINE

## 2017-01-01 PROCEDURE — 25010000002 TBO-FILGRASTIM 300 MCG/0.5ML SOLUTION PREFILLED SYRINGE: Performed by: INTERNAL MEDICINE

## 2017-01-01 PROCEDURE — 74420 UROGRAPHY RTRGR +-KUB: CPT

## 2017-01-01 PROCEDURE — 25010000002 POTASSIUM CHLORIDE PER 2 MEQ OF POTASSIUM: Performed by: INTERNAL MEDICINE

## 2017-01-01 PROCEDURE — 25010000002 INFLUENZA VAC SUBUNIT QUAD 0.5 ML SUSPENSION PREFILLED SYRINGE: Performed by: INTERNAL MEDICINE

## 2017-01-01 PROCEDURE — 36415 COLL VENOUS BLD VENIPUNCTURE: CPT

## 2017-01-01 PROCEDURE — 71260 CT THORAX DX C+: CPT

## 2017-01-01 PROCEDURE — 36415 COLL VENOUS BLD VENIPUNCTURE: CPT | Performed by: NURSE PRACTITIONER

## 2017-01-01 PROCEDURE — 76942 ECHO GUIDE FOR BIOPSY: CPT

## 2017-01-01 PROCEDURE — 88112 CYTOPATH CELL ENHANCE TECH: CPT | Performed by: NURSE PRACTITIONER

## 2017-01-01 PROCEDURE — 80053 COMPREHEN METABOLIC PANEL: CPT

## 2017-01-01 PROCEDURE — 25010000002 PROPOFOL 10 MG/ML EMULSION: Performed by: ANESTHESIOLOGY

## 2017-01-01 PROCEDURE — 85025 COMPLETE CBC W/AUTO DIFF WBC: CPT | Performed by: UROLOGY

## 2017-01-01 PROCEDURE — 74177 CT ABD & PELVIS W/CONTRAST: CPT

## 2017-01-01 PROCEDURE — 96409 CHEMO IV PUSH SNGL DRUG: CPT

## 2017-01-01 DEVICE — URETERAL STENT
Type: IMPLANTABLE DEVICE | Status: FUNCTIONAL
Brand: PERCUFLEX™ PLUS

## 2017-01-01 RX ORDER — OXYCODONE HYDROCHLORIDE 15 MG/1
15 TABLET ORAL EVERY 6 HOURS PRN
Status: DISCONTINUED | OUTPATIENT
Start: 2017-01-01 | End: 2017-01-01 | Stop reason: HOSPADM

## 2017-01-01 RX ORDER — PROMETHAZINE HYDROCHLORIDE 25 MG/1
25 SUPPOSITORY RECTAL ONCE AS NEEDED
Status: DISCONTINUED | OUTPATIENT
Start: 2017-01-01 | End: 2017-01-01

## 2017-01-01 RX ORDER — ALPRAZOLAM 0.5 MG/1
TABLET ORAL
Qty: 60 TABLET | Refills: 0 | OUTPATIENT
Start: 2017-01-01 | End: 2017-01-01 | Stop reason: SDUPTHER

## 2017-01-01 RX ORDER — SODIUM CHLORIDE 9 MG/ML
100 INJECTION, SOLUTION INTRAVENOUS CONTINUOUS
Status: DISCONTINUED | OUTPATIENT
Start: 2017-01-01 | End: 2017-01-01 | Stop reason: HOSPADM

## 2017-01-01 RX ORDER — DEXTROSE MONOHYDRATE 50 MG/ML
250 INJECTION, SOLUTION INTRAVENOUS ONCE
Status: COMPLETED | OUTPATIENT
Start: 2017-01-01 | End: 2017-01-01

## 2017-01-01 RX ORDER — ONDANSETRON HYDROCHLORIDE 8 MG/1
8 TABLET, FILM COATED ORAL EVERY 8 HOURS PRN
Qty: 30 TABLET | Refills: 2 | Status: SHIPPED | OUTPATIENT
Start: 2017-01-01

## 2017-01-01 RX ORDER — SODIUM CHLORIDE 9 MG/ML
250 INJECTION, SOLUTION INTRAVENOUS ONCE
Status: CANCELLED | OUTPATIENT
Start: 2017-01-01

## 2017-01-01 RX ORDER — SODIUM CHLORIDE 0.9 % (FLUSH) 0.9 %
20 SYRINGE (ML) INJECTION AS NEEDED
Status: DISCONTINUED | OUTPATIENT
Start: 2017-01-01 | End: 2017-01-01 | Stop reason: HOSPADM

## 2017-01-01 RX ORDER — ONDANSETRON 4 MG/1
4 TABLET, ORALLY DISINTEGRATING ORAL EVERY 6 HOURS PRN
Status: DISCONTINUED | OUTPATIENT
Start: 2017-01-01 | End: 2017-01-01 | Stop reason: HOSPADM

## 2017-01-01 RX ORDER — SODIUM CHLORIDE 0.9 % (FLUSH) 0.9 %
10 SYRINGE (ML) INJECTION AS NEEDED
Status: CANCELLED | OUTPATIENT
Start: 2017-01-01

## 2017-01-01 RX ORDER — FLUOROURACIL 50 MG/ML
425 INJECTION, SOLUTION INTRAVENOUS ONCE
Status: CANCELLED | OUTPATIENT
Start: 2017-01-01

## 2017-01-01 RX ORDER — DEXTROSE MONOHYDRATE 50 MG/ML
250 INJECTION, SOLUTION INTRAVENOUS ONCE
Status: CANCELLED | OUTPATIENT
Start: 2017-01-01

## 2017-01-01 RX ORDER — SPIRONOLACTONE 25 MG/1
25 TABLET ORAL EVERY OTHER DAY
Qty: 15 TABLET | Refills: 1 | Status: SHIPPED | OUTPATIENT
Start: 2017-01-01 | End: 2017-01-01 | Stop reason: SDUPTHER

## 2017-01-01 RX ORDER — FLUMAZENIL 0.1 MG/ML
0.2 INJECTION INTRAVENOUS AS NEEDED
Status: DISCONTINUED | OUTPATIENT
Start: 2017-01-01 | End: 2017-01-01

## 2017-01-01 RX ORDER — SENNA AND DOCUSATE SODIUM 50; 8.6 MG/1; MG/1
2 TABLET, FILM COATED ORAL 2 TIMES DAILY
Status: DISCONTINUED | OUTPATIENT
Start: 2017-01-01 | End: 2017-01-01 | Stop reason: HOSPADM

## 2017-01-01 RX ORDER — OCTREOTIDE ACETATE 100 UG/ML
100 INJECTION, SOLUTION INTRAVENOUS; SUBCUTANEOUS ONCE
Status: COMPLETED | OUTPATIENT
Start: 2017-01-01 | End: 2017-01-01

## 2017-01-01 RX ORDER — SPIRONOLACTONE 25 MG/1
TABLET ORAL
Qty: 30 TABLET | Refills: 1 | Status: SHIPPED | OUTPATIENT
Start: 2017-01-01 | End: 2017-01-01 | Stop reason: SDUPTHER

## 2017-01-01 RX ORDER — SODIUM CHLORIDE 0.9 % (FLUSH) 0.9 %
10 SYRINGE (ML) INJECTION AS NEEDED
Status: DISCONTINUED | OUTPATIENT
Start: 2017-01-01 | End: 2017-01-01 | Stop reason: HOSPADM

## 2017-01-01 RX ORDER — FAMOTIDINE 10 MG/ML
20 INJECTION, SOLUTION INTRAVENOUS ONCE
Status: COMPLETED | OUTPATIENT
Start: 2017-01-01 | End: 2017-01-01

## 2017-01-01 RX ORDER — PHENAZOPYRIDINE HYDROCHLORIDE 100 MG/1
100 TABLET, FILM COATED ORAL 3 TIMES DAILY PRN
Qty: 20 TABLET | Refills: 0 | Status: SHIPPED | OUTPATIENT
Start: 2017-01-01 | End: 2017-01-01

## 2017-01-01 RX ORDER — MIDAZOLAM HYDROCHLORIDE 1 MG/ML
2 INJECTION INTRAMUSCULAR; INTRAVENOUS
Status: DISCONTINUED | OUTPATIENT
Start: 2017-01-01 | End: 2017-01-01 | Stop reason: HOSPADM

## 2017-01-01 RX ORDER — HYDROMORPHONE HYDROCHLORIDE 1 MG/ML
0.5 INJECTION, SOLUTION INTRAMUSCULAR; INTRAVENOUS; SUBCUTANEOUS ONCE
Status: COMPLETED | OUTPATIENT
Start: 2017-01-01 | End: 2017-01-01

## 2017-01-01 RX ORDER — NALOXONE HCL 0.4 MG/ML
0.2 VIAL (ML) INJECTION AS NEEDED
Status: DISCONTINUED | OUTPATIENT
Start: 2017-01-01 | End: 2017-01-01

## 2017-01-01 RX ORDER — SODIUM CHLORIDE 9 MG/ML
250 INJECTION, SOLUTION INTRAVENOUS CONTINUOUS
Status: CANCELLED
Start: 2017-01-01

## 2017-01-01 RX ORDER — ALPRAZOLAM 0.5 MG/1
0.5 TABLET ORAL 2 TIMES DAILY
Qty: 60 TABLET | Refills: 0 | OUTPATIENT
Start: 2017-01-01 | End: 2017-01-01 | Stop reason: SDUPTHER

## 2017-01-01 RX ORDER — PROCHLORPERAZINE MALEATE 10 MG
10 TABLET ORAL EVERY 6 HOURS PRN
Qty: 30 TABLET | Refills: 2 | COMMUNITY
End: 2017-01-01

## 2017-01-01 RX ORDER — SODIUM CHLORIDE 9 MG/ML
1000 INJECTION, SOLUTION INTRAVENOUS ONCE
Status: COMPLETED | OUTPATIENT
Start: 2017-01-01 | End: 2017-01-01

## 2017-01-01 RX ORDER — DIPHENHYDRAMINE HYDROCHLORIDE 50 MG/ML
12.5 INJECTION INTRAMUSCULAR; INTRAVENOUS
Status: DISCONTINUED | OUTPATIENT
Start: 2017-01-01 | End: 2017-01-01

## 2017-01-01 RX ORDER — SODIUM CHLORIDE, SODIUM LACTATE, POTASSIUM CHLORIDE, CALCIUM CHLORIDE 600; 310; 30; 20 MG/100ML; MG/100ML; MG/100ML; MG/100ML
9 INJECTION, SOLUTION INTRAVENOUS CONTINUOUS
Status: DISCONTINUED | OUTPATIENT
Start: 2017-01-01 | End: 2017-01-01

## 2017-01-01 RX ORDER — ALPRAZOLAM 0.5 MG/1
TABLET ORAL
Qty: 60 TABLET | Refills: 0 | OUTPATIENT
Start: 2017-01-01

## 2017-01-01 RX ORDER — SODIUM CHLORIDE 9 MG/ML
500 INJECTION, SOLUTION INTRAVENOUS ONCE
Status: COMPLETED | OUTPATIENT
Start: 2017-01-01 | End: 2017-01-01

## 2017-01-01 RX ORDER — ATROPINE SULFATE 0.4 MG/ML
0.25 AMPUL (ML) INJECTION
Status: DISCONTINUED | OUTPATIENT
Start: 2017-01-01 | End: 2017-01-01 | Stop reason: HOSPADM

## 2017-01-01 RX ORDER — DOXEPIN HYDROCHLORIDE 25 MG/1
CAPSULE ORAL
Qty: 30 CAPSULE | Refills: 6 | Status: SHIPPED | OUTPATIENT
Start: 2017-01-01 | End: 2018-01-01 | Stop reason: SDUPTHER

## 2017-01-01 RX ORDER — FLUOROURACIL 50 MG/ML
425 INJECTION, SOLUTION INTRAVENOUS ONCE
Status: COMPLETED | OUTPATIENT
Start: 2017-01-01 | End: 2017-01-01

## 2017-01-01 RX ORDER — DIPHENOXYLATE HYDROCHLORIDE AND ATROPINE SULFATE 2.5; .025 MG/1; MG/1
TABLET ORAL
Qty: 40 TABLET | Refills: 1 | OUTPATIENT
Start: 2017-01-01

## 2017-01-01 RX ORDER — CEPHALEXIN 500 MG/1
500 CAPSULE ORAL 3 TIMES DAILY
Qty: 15 CAPSULE | Refills: 0 | Status: SHIPPED | OUTPATIENT
Start: 2017-01-01 | End: 2017-01-01

## 2017-01-01 RX ORDER — HYDROCODONE BITARTRATE AND ACETAMINOPHEN 7.5; 325 MG/1; MG/1
1 TABLET ORAL EVERY 4 HOURS PRN
Status: DISCONTINUED | OUTPATIENT
Start: 2017-01-01 | End: 2017-01-01

## 2017-01-01 RX ORDER — PROMETHAZINE HYDROCHLORIDE 25 MG/ML
12.5 INJECTION, SOLUTION INTRAMUSCULAR; INTRAVENOUS ONCE AS NEEDED
Status: DISCONTINUED | OUTPATIENT
Start: 2017-01-01 | End: 2017-01-01

## 2017-01-01 RX ORDER — PALONOSETRON 0.05 MG/ML
0.25 INJECTION, SOLUTION INTRAVENOUS ONCE
Status: COMPLETED | OUTPATIENT
Start: 2017-01-01 | End: 2017-01-01

## 2017-01-01 RX ORDER — ATROPINE SULFATE 1 MG/ML
0.25 INJECTION, SOLUTION INTRAMUSCULAR; INTRAVENOUS; SUBCUTANEOUS
Status: CANCELLED | OUTPATIENT
Start: 2017-01-01

## 2017-01-01 RX ORDER — HYDROMORPHONE HYDROCHLORIDE 1 MG/ML
0.5 INJECTION, SOLUTION INTRAMUSCULAR; INTRAVENOUS; SUBCUTANEOUS
Status: DISCONTINUED | OUTPATIENT
Start: 2017-01-01 | End: 2017-01-01

## 2017-01-01 RX ORDER — KETOROLAC TROMETHAMINE 15 MG/ML
15 INJECTION, SOLUTION INTRAMUSCULAR; INTRAVENOUS ONCE
Status: COMPLETED | OUTPATIENT
Start: 2017-01-01 | End: 2017-01-01

## 2017-01-01 RX ORDER — POTASSIUM CHLORIDE 7.45 MG/ML
10 INJECTION INTRAVENOUS
Status: COMPLETED | OUTPATIENT
Start: 2017-01-01 | End: 2017-01-01

## 2017-01-01 RX ORDER — ONDANSETRON 2 MG/ML
4 INJECTION INTRAMUSCULAR; INTRAVENOUS EVERY 6 HOURS PRN
Status: DISCONTINUED | OUTPATIENT
Start: 2017-01-01 | End: 2017-01-01 | Stop reason: HOSPADM

## 2017-01-01 RX ORDER — SODIUM CHLORIDE 9 MG/ML
250 INJECTION, SOLUTION INTRAVENOUS ONCE
Status: COMPLETED | OUTPATIENT
Start: 2017-01-01 | End: 2017-01-01

## 2017-01-01 RX ORDER — OCTREOTIDE ACETATE 100 UG/ML
100 INJECTION, SOLUTION INTRAVENOUS; SUBCUTANEOUS ONCE
Status: CANCELLED
Start: 2017-01-01 | End: 2017-01-01

## 2017-01-01 RX ORDER — ALPRAZOLAM 0.5 MG/1
TABLET ORAL
Qty: 60 TABLET | Refills: 0 | Status: SHIPPED | OUTPATIENT
Start: 2017-01-01 | End: 2017-01-01 | Stop reason: SDUPTHER

## 2017-01-01 RX ORDER — ALPRAZOLAM 0.5 MG/1
TABLET ORAL
Qty: 60 TABLET | Refills: 0 | Status: CANCELLED | OUTPATIENT
Start: 2017-01-01

## 2017-01-01 RX ORDER — ONDANSETRON 2 MG/ML
4 INJECTION INTRAMUSCULAR; INTRAVENOUS ONCE
Status: COMPLETED | OUTPATIENT
Start: 2017-01-01 | End: 2017-01-01

## 2017-01-01 RX ORDER — EPHEDRINE SULFATE 50 MG/ML
5 INJECTION, SOLUTION INTRAVENOUS ONCE AS NEEDED
Status: DISCONTINUED | OUTPATIENT
Start: 2017-01-01 | End: 2017-01-01

## 2017-01-01 RX ORDER — LANOLIN ALCOHOL/MO/W.PET/CERES
CREAM (GRAM) TOPICAL
Qty: 30 TABLET | Refills: 2 | Status: SHIPPED | OUTPATIENT
Start: 2017-01-01 | End: 2017-01-01 | Stop reason: SDUPTHER

## 2017-01-01 RX ORDER — ONDANSETRON 2 MG/ML
4 INJECTION INTRAMUSCULAR; INTRAVENOUS ONCE AS NEEDED
Status: DISCONTINUED | OUTPATIENT
Start: 2017-01-01 | End: 2017-01-01

## 2017-01-01 RX ORDER — PALONOSETRON 0.05 MG/ML
0.25 INJECTION, SOLUTION INTRAVENOUS ONCE
Status: CANCELLED | OUTPATIENT
Start: 2017-01-01

## 2017-01-01 RX ORDER — PROMETHAZINE HYDROCHLORIDE 25 MG/1
25 TABLET ORAL ONCE AS NEEDED
Status: DISCONTINUED | OUTPATIENT
Start: 2017-01-01 | End: 2017-01-01

## 2017-01-01 RX ORDER — PROPOFOL 10 MG/ML
VIAL (ML) INTRAVENOUS AS NEEDED
Status: DISCONTINUED | OUTPATIENT
Start: 2017-01-01 | End: 2017-01-01 | Stop reason: SURG

## 2017-01-01 RX ORDER — LIDOCAINE HYDROCHLORIDE 10 MG/ML
20 INJECTION, SOLUTION INFILTRATION; PERINEURAL ONCE
Status: COMPLETED | OUTPATIENT
Start: 2017-01-01 | End: 2017-01-01

## 2017-01-01 RX ORDER — ALPRAZOLAM 0.5 MG/1
0.5 TABLET ORAL 2 TIMES DAILY PRN
Status: DISCONTINUED | OUTPATIENT
Start: 2017-01-01 | End: 2017-01-01 | Stop reason: HOSPADM

## 2017-01-01 RX ORDER — OXYCODONE AND ACETAMINOPHEN 7.5; 325 MG/1; MG/1
1 TABLET ORAL ONCE AS NEEDED
Status: DISCONTINUED | OUTPATIENT
Start: 2017-01-01 | End: 2017-01-01

## 2017-01-01 RX ORDER — SODIUM CHLORIDE 9 MG/ML
500 INJECTION, SOLUTION INTRAVENOUS CONTINUOUS
Status: DISCONTINUED | OUTPATIENT
Start: 2017-01-01 | End: 2017-01-01 | Stop reason: HOSPADM

## 2017-01-01 RX ORDER — LIDOCAINE HYDROCHLORIDE 20 MG/ML
INJECTION, SOLUTION INFILTRATION; PERINEURAL AS NEEDED
Status: DISCONTINUED | OUTPATIENT
Start: 2017-01-01 | End: 2017-01-01 | Stop reason: SURG

## 2017-01-01 RX ORDER — MELATONIN
1000 DAILY
COMMUNITY
End: 2017-01-01

## 2017-01-01 RX ORDER — LANOLIN ALCOHOL/MO/W.PET/CERES
CREAM (GRAM) TOPICAL
Qty: 30 TABLET | Refills: 3 | OUTPATIENT
Start: 2017-01-01 | End: 2017-01-01

## 2017-01-01 RX ORDER — HYDROCODONE BITARTRATE AND ACETAMINOPHEN 7.5; 325 MG/1; MG/1
1 TABLET ORAL ONCE AS NEEDED
Status: DISCONTINUED | OUTPATIENT
Start: 2017-01-01 | End: 2017-01-01

## 2017-01-01 RX ORDER — PROMETHAZINE HYDROCHLORIDE 25 MG/1
12.5 TABLET ORAL ONCE AS NEEDED
Status: DISCONTINUED | OUTPATIENT
Start: 2017-01-01 | End: 2017-01-01

## 2017-01-01 RX ORDER — MIDAZOLAM HYDROCHLORIDE 1 MG/ML
1 INJECTION INTRAMUSCULAR; INTRAVENOUS
Status: DISCONTINUED | OUTPATIENT
Start: 2017-01-01 | End: 2017-01-01 | Stop reason: HOSPADM

## 2017-01-01 RX ORDER — ONDANSETRON 4 MG/1
4 TABLET, FILM COATED ORAL EVERY 6 HOURS PRN
Status: DISCONTINUED | OUTPATIENT
Start: 2017-01-01 | End: 2017-01-01 | Stop reason: HOSPADM

## 2017-01-01 RX ORDER — CAPECITABINE 500 MG/1
TABLET, FILM COATED ORAL
Qty: 112 TABLET | Refills: 3 | Status: SHIPPED | OUTPATIENT
Start: 2017-01-01 | End: 2018-01-01 | Stop reason: SDUPTHER

## 2017-01-01 RX ORDER — LABETALOL HYDROCHLORIDE 5 MG/ML
5 INJECTION, SOLUTION INTRAVENOUS
Status: DISCONTINUED | OUTPATIENT
Start: 2017-01-01 | End: 2017-01-01

## 2017-01-01 RX ORDER — DIPHENOXYLATE HYDROCHLORIDE AND ATROPINE SULFATE 2.5; .025 MG/1; MG/1
1 TABLET ORAL 4 TIMES DAILY PRN
Status: DISCONTINUED | OUTPATIENT
Start: 2017-01-01 | End: 2017-01-01 | Stop reason: HOSPADM

## 2017-01-01 RX ORDER — ACETAMINOPHEN 325 MG/1
650 TABLET ORAL EVERY 4 HOURS PRN
Status: DISCONTINUED | OUTPATIENT
Start: 2017-01-01 | End: 2017-01-01 | Stop reason: HOSPADM

## 2017-01-01 RX ORDER — FENTANYL CITRATE 50 UG/ML
50 INJECTION, SOLUTION INTRAMUSCULAR; INTRAVENOUS
Status: DISCONTINUED | OUTPATIENT
Start: 2017-01-01 | End: 2017-01-01

## 2017-01-01 RX ORDER — FAMOTIDINE 20 MG/1
20 TABLET, FILM COATED ORAL EVERY MORNING
Status: DISCONTINUED | OUTPATIENT
Start: 2017-01-01 | End: 2017-01-01 | Stop reason: HOSPADM

## 2017-01-01 RX ORDER — POLYETHYLENE GLYCOL 3350 17 G/17G
17 POWDER, FOR SOLUTION ORAL DAILY PRN
Status: DISCONTINUED | OUTPATIENT
Start: 2017-01-01 | End: 2017-01-01 | Stop reason: HOSPADM

## 2017-01-01 RX ORDER — OCTREOTIDE ACETATE 100 UG/ML
100 INJECTION, SOLUTION INTRAVENOUS; SUBCUTANEOUS ONCE
Status: DISCONTINUED | OUTPATIENT
Start: 2017-01-01 | End: 2017-01-01 | Stop reason: HOSPADM

## 2017-01-01 RX ORDER — SPIRONOLACTONE 25 MG/1
TABLET ORAL
Qty: 30 TABLET | Refills: 1 | OUTPATIENT
Start: 2017-01-01 | End: 2018-01-01 | Stop reason: SDUPTHER

## 2017-01-01 RX ORDER — FENTANYL CITRATE 50 UG/ML
50 INJECTION, SOLUTION INTRAMUSCULAR; INTRAVENOUS
Status: DISCONTINUED | OUTPATIENT
Start: 2017-01-01 | End: 2017-01-01 | Stop reason: HOSPADM

## 2017-01-01 RX ORDER — HYDRALAZINE HYDROCHLORIDE 20 MG/ML
5 INJECTION INTRAMUSCULAR; INTRAVENOUS
Status: DISCONTINUED | OUTPATIENT
Start: 2017-01-01 | End: 2017-01-01

## 2017-01-01 RX ORDER — MAGNESIUM HYDROXIDE 1200 MG/15ML
LIQUID ORAL AS NEEDED
Status: DISCONTINUED | OUTPATIENT
Start: 2017-01-01 | End: 2017-01-01 | Stop reason: HOSPADM

## 2017-01-01 RX ORDER — HYDROMORPHONE HYDROCHLORIDE 1 MG/ML
0.5 INJECTION, SOLUTION INTRAMUSCULAR; INTRAVENOUS; SUBCUTANEOUS
Status: DISCONTINUED | OUTPATIENT
Start: 2017-01-01 | End: 2017-01-01 | Stop reason: HOSPADM

## 2017-01-01 RX ORDER — PHENAZOPYRIDINE HYDROCHLORIDE 100 MG/1
100 TABLET, FILM COATED ORAL
Status: DISCONTINUED | OUTPATIENT
Start: 2017-01-01 | End: 2017-01-01 | Stop reason: HOSPADM

## 2017-01-01 RX ORDER — MELOXICAM 15 MG/1
TABLET ORAL
Refills: 3 | COMMUNITY
Start: 2017-01-01 | End: 2018-01-01

## 2017-01-01 RX ORDER — ALPRAZOLAM 0.5 MG/1
TABLET ORAL
Qty: 60 TABLET | Refills: 0 | OUTPATIENT
Start: 2017-01-01 | End: 2018-01-01 | Stop reason: SDUPTHER

## 2017-01-01 RX ORDER — SODIUM CHLORIDE 0.9 % (FLUSH) 0.9 %
1-10 SYRINGE (ML) INJECTION AS NEEDED
Status: DISCONTINUED | OUTPATIENT
Start: 2017-01-01 | End: 2017-01-01 | Stop reason: HOSPADM

## 2017-01-01 RX ORDER — SPIRONOLACTONE 25 MG/1
25 TABLET ORAL DAILY
Qty: 30 TABLET | Refills: 1 | Status: SHIPPED | OUTPATIENT
Start: 2017-01-01 | End: 2017-01-01 | Stop reason: SDUPTHER

## 2017-01-01 RX ORDER — DOXEPIN HYDROCHLORIDE 25 MG/1
25 CAPSULE ORAL NIGHTLY
Status: DISCONTINUED | OUTPATIENT
Start: 2017-01-01 | End: 2017-01-01 | Stop reason: HOSPADM

## 2017-01-01 RX ORDER — SODIUM CHLORIDE 9 MG/ML
1000 INJECTION, SOLUTION INTRAVENOUS CONTINUOUS
Status: CANCELLED
Start: 2017-01-01

## 2017-01-01 RX ORDER — SPIRONOLACTONE 25 MG/1
25 TABLET ORAL DAILY
Status: DISCONTINUED | OUTPATIENT
Start: 2017-01-01 | End: 2017-01-01 | Stop reason: HOSPADM

## 2017-01-01 RX ORDER — DIPHENOXYLATE HYDROCHLORIDE AND ATROPINE SULFATE 2.5; .025 MG/1; MG/1
1 TABLET ORAL 4 TIMES DAILY PRN
Qty: 40 TABLET | Refills: 1 | OUTPATIENT
Start: 2017-01-01 | End: 2017-01-01 | Stop reason: SDUPTHER

## 2017-01-01 RX ADMIN — Medication 20 ML: at 13:22

## 2017-01-01 RX ADMIN — DEXAMETHASONE SODIUM PHOSPHATE 12 MG: 10 INJECTION, SOLUTION INTRAMUSCULAR; INTRAVENOUS at 13:05

## 2017-01-01 RX ADMIN — Medication 500 UNITS: at 16:43

## 2017-01-01 RX ADMIN — MIDAZOLAM 1 MG: 1 INJECTION INTRAMUSCULAR; INTRAVENOUS at 10:03

## 2017-01-01 RX ADMIN — ONDANSETRON 4 MG: 2 INJECTION INTRAMUSCULAR; INTRAVENOUS at 02:15

## 2017-01-01 RX ADMIN — FLUOROURACIL 970 MG: 50 INJECTION, SOLUTION INTRAVENOUS at 15:28

## 2017-01-01 RX ADMIN — SODIUM CHLORIDE 250 ML: 900 INJECTION, SOLUTION INTRAVENOUS at 13:20

## 2017-01-01 RX ADMIN — OCTREOTIDE ACETATE 100 MCG: 100 INJECTION, SOLUTION INTRAVENOUS; SUBCUTANEOUS at 15:31

## 2017-01-01 RX ADMIN — LEUCOVORIN CALCIUM 970 MG: 350 INJECTION, POWDER, LYOPHILIZED, FOR SOLUTION INTRAMUSCULAR; INTRAVENOUS at 09:30

## 2017-01-01 RX ADMIN — LEUCOVORIN CALCIUM 970 MG: 350 INJECTION, POWDER, LYOPHILIZED, FOR SOLUTION INTRAMUSCULAR; INTRAVENOUS at 13:45

## 2017-01-01 RX ADMIN — Medication 20 ML: at 13:20

## 2017-01-01 RX ADMIN — HYDROMORPHONE HYDROCHLORIDE 1 MG: 1 INJECTION, SOLUTION INTRAMUSCULAR; INTRAVENOUS; SUBCUTANEOUS at 04:35

## 2017-01-01 RX ADMIN — Medication 10 ML: at 12:50

## 2017-01-01 RX ADMIN — TBO-FILGRASTIM 480 MCG: 300 INJECTION, SOLUTION SUBCUTANEOUS at 14:37

## 2017-01-01 RX ADMIN — SODIUM CHLORIDE 250 ML: 900 INJECTION, SOLUTION INTRAVENOUS at 13:32

## 2017-01-01 RX ADMIN — Medication 10 ML: at 13:27

## 2017-01-01 RX ADMIN — DEXAMETHASONE SODIUM PHOSPHATE 12 MG: 10 INJECTION, SOLUTION INTRAMUSCULAR; INTRAVENOUS at 13:32

## 2017-01-01 RX ADMIN — POTASSIUM CHLORIDE 10 MEQ: 7.46 INJECTION, SOLUTION INTRAVENOUS at 09:21

## 2017-01-01 RX ADMIN — LIDOCAINE HYDROCHLORIDE 50 MG: 20 INJECTION, SOLUTION INFILTRATION; PERINEURAL at 10:31

## 2017-01-01 RX ADMIN — PALONOSETRON HYDROCHLORIDE 0.25 MG: 0.25 INJECTION INTRAVENOUS at 09:28

## 2017-01-01 RX ADMIN — DEXAMETHASONE SODIUM PHOSPHATE 12 MG: 10 INJECTION, SOLUTION INTRAMUSCULAR; INTRAVENOUS at 14:13

## 2017-01-01 RX ADMIN — DEXAMETHASONE SODIUM PHOSPHATE 12 MG: 10 INJECTION, SOLUTION INTRAMUSCULAR; INTRAVENOUS at 09:30

## 2017-01-01 RX ADMIN — DEXAMETHASONE SODIUM PHOSPHATE 12 MG: 10 INJECTION INTRAMUSCULAR; INTRAVENOUS at 13:44

## 2017-01-01 RX ADMIN — DEXTROSE MONOHYDRATE 250 ML: 5 INJECTION, SOLUTION INTRAVENOUS at 09:21

## 2017-01-01 RX ADMIN — ONDANSETRON 4 MG: 2 INJECTION INTRAMUSCULAR; INTRAVENOUS at 01:22

## 2017-01-01 RX ADMIN — DEXAMETHASONE SODIUM PHOSPHATE 12 MG: 10 INJECTION, SOLUTION INTRAMUSCULAR; INTRAVENOUS at 14:12

## 2017-01-01 RX ADMIN — OXALIPLATIN 170 MG: 100 INJECTION, SOLUTION INTRAVENOUS at 10:15

## 2017-01-01 RX ADMIN — SODIUM CHLORIDE 1000 ML: 900 INJECTION, SOLUTION INTRAVENOUS at 12:55

## 2017-01-01 RX ADMIN — SODIUM CHLORIDE 100 ML/HR: 9 INJECTION, SOLUTION INTRAVENOUS at 12:30

## 2017-01-01 RX ADMIN — IOPAMIDOL 85 ML: 612 INJECTION, SOLUTION INTRAVENOUS at 14:30

## 2017-01-01 RX ADMIN — SODIUM CHLORIDE 500 ML/HR: 900 INJECTION, SOLUTION INTRAVENOUS at 11:50

## 2017-01-01 RX ADMIN — DEXTROSE MONOHYDRATE 250 ML: 50 INJECTION, SOLUTION INTRAVENOUS at 13:34

## 2017-01-01 RX ADMIN — DEXAMETHASONE SODIUM PHOSPHATE 12 MG: 10 INJECTION, SOLUTION INTRAMUSCULAR; INTRAVENOUS at 12:18

## 2017-01-01 RX ADMIN — SODIUM CHLORIDE 500 ML: 900 INJECTION, SOLUTION INTRAVENOUS at 12:58

## 2017-01-01 RX ADMIN — SODIUM CHLORIDE 1 G: 9 INJECTION, SOLUTION INTRAVENOUS at 10:16

## 2017-01-01 RX ADMIN — Medication 20 ML: at 14:40

## 2017-01-01 RX ADMIN — DEXAMETHASONE SODIUM PHOSPHATE 12 MG: 10 INJECTION INTRAMUSCULAR; INTRAVENOUS at 13:38

## 2017-01-01 RX ADMIN — SODIUM CHLORIDE 250 ML: 900 INJECTION, SOLUTION INTRAVENOUS at 14:10

## 2017-01-01 RX ADMIN — DEXAMETHASONE SODIUM PHOSPHATE 12 MG: 10 INJECTION, SOLUTION INTRAMUSCULAR; INTRAVENOUS at 13:54

## 2017-01-01 RX ADMIN — DEXAMETHASONE SODIUM PHOSPHATE 12 MG: 10 INJECTION, SOLUTION INTRAMUSCULAR; INTRAVENOUS at 13:26

## 2017-01-01 RX ADMIN — SODIUM CHLORIDE, PRESERVATIVE FREE 500 UNITS: 5 INJECTION INTRAVENOUS at 13:22

## 2017-01-01 RX ADMIN — OXALIPLATIN 130 MG: 5 INJECTION, SOLUTION, CONCENTRATE INTRAVENOUS at 12:37

## 2017-01-01 RX ADMIN — SODIUM CHLORIDE, PRESERVATIVE FREE 500 UNITS: 5 INJECTION INTRAVENOUS at 13:20

## 2017-01-01 RX ADMIN — FENTANYL CITRATE 50 MCG: 50 INJECTION INTRAMUSCULAR; INTRAVENOUS at 10:50

## 2017-01-01 RX ADMIN — ONDANSETRON 4 MG: 2 INJECTION INTRAMUSCULAR; INTRAVENOUS at 11:06

## 2017-01-01 RX ADMIN — Medication 500 UNITS: at 03:11

## 2017-01-01 RX ADMIN — SODIUM CHLORIDE 250 ML: 900 INJECTION, SOLUTION INTRAVENOUS at 13:40

## 2017-01-01 RX ADMIN — DEXTROSE MONOHYDRATE 250 ML: 5 INJECTION, SOLUTION INTRAVENOUS at 13:45

## 2017-01-01 RX ADMIN — DEXTROSE MONOHYDRATE 250 ML: 5 INJECTION, SOLUTION INTRAVENOUS at 12:18

## 2017-01-01 RX ADMIN — OCTREOTIDE ACETATE 100 MCG: 100 INJECTION, SOLUTION INTRAVENOUS; SUBCUTANEOUS at 14:54

## 2017-01-01 RX ADMIN — PROPOFOL 120 MG: 10 INJECTION, EMULSION INTRAVENOUS at 10:31

## 2017-01-01 RX ADMIN — PALONOSETRON HYDROCHLORIDE 0.25 MG: 0.25 INJECTION INTRAVENOUS at 13:35

## 2017-01-01 RX ADMIN — DIATRIZOATE MEGLUMINE AND DIATRIZOATE SODIUM 30 ML: 660; 100 LIQUID ORAL; RECTAL at 14:30

## 2017-01-01 RX ADMIN — PROPOFOL 50 MG: 10 INJECTION, EMULSION INTRAVENOUS at 10:50

## 2017-01-01 RX ADMIN — FENTANYL CITRATE 50 MCG: 50 INJECTION INTRAMUSCULAR; INTRAVENOUS at 10:29

## 2017-01-01 RX ADMIN — MIDAZOLAM 1 MG: 1 INJECTION INTRAMUSCULAR; INTRAVENOUS at 09:32

## 2017-01-01 RX ADMIN — HYDROMORPHONE HYDROCHLORIDE 0.5 MG: 1 INJECTION, SOLUTION INTRAMUSCULAR; INTRAVENOUS; SUBCUTANEOUS at 01:22

## 2017-01-01 RX ADMIN — A/SINGAPORE/GP1908/2015 IVR-180 (H1N1) (AN A/MICHIGAN/45/2015-LIKE VIRUS), A/SINGAPORE/GP2050/2015 (H3N2) (AN A/HONG KONG/4801/2014 - LIKE VIRUS), B/UTAH/9/2014 (A B/PHUKET/3073/2013-LIKE VIRUS), B/HONG KONG/259/2010 (A B/BRISBANE/60/08-LIKE VIRUS) 0.5 ML: 15; 15; 15; 15 INJECTION, SUSPENSION INTRAMUSCULAR at 17:09

## 2017-01-01 RX ADMIN — SODIUM CHLORIDE 150 MG: 9 INJECTION, SOLUTION INTRAVENOUS at 13:46

## 2017-01-01 RX ADMIN — SODIUM CHLORIDE 250 ML: 900 INJECTION, SOLUTION INTRAVENOUS at 14:00

## 2017-01-01 RX ADMIN — SODIUM CHLORIDE: 900 INJECTION, SOLUTION INTRAVENOUS at 14:08

## 2017-01-01 RX ADMIN — PROPOFOL 30 MG: 10 INJECTION, EMULSION INTRAVENOUS at 10:35

## 2017-01-01 RX ADMIN — HYDROCODONE BITARTRATE AND ACETAMINOPHEN 1 TABLET: 7.5; 325 TABLET ORAL at 05:09

## 2017-01-01 RX ADMIN — SODIUM CHLORIDE 500 ML: 900 INJECTION, SOLUTION INTRAVENOUS at 13:52

## 2017-01-01 RX ADMIN — SODIUM CHLORIDE 100 ML/HR: 9 INJECTION, SOLUTION INTRAVENOUS at 04:38

## 2017-01-01 RX ADMIN — OCTREOTIDE ACETATE 100 MCG: 100 INJECTION, SOLUTION INTRAVENOUS; SUBCUTANEOUS at 13:03

## 2017-01-01 RX ADMIN — FLUOROURACIL 970 MG: 50 INJECTION, SOLUTION INTRAVENOUS at 14:47

## 2017-01-01 RX ADMIN — POTASSIUM CHLORIDE 10 MEQ: 7.46 INJECTION, SOLUTION INTRAVENOUS at 10:17

## 2017-01-01 RX ADMIN — FAMOTIDINE 20 MG: 10 INJECTION INTRAVENOUS at 09:32

## 2017-01-01 RX ADMIN — LEUCOVORIN CALCIUM 970 MG: 350 INJECTION, POWDER, LYOPHILIZED, FOR SOLUTION INTRAMUSCULAR; INTRAVENOUS at 14:30

## 2017-01-01 RX ADMIN — SODIUM CHLORIDE 250 ML: 900 INJECTION, SOLUTION INTRAVENOUS at 13:03

## 2017-01-01 RX ADMIN — SODIUM CHLORIDE 250 ML: 900 INJECTION, SOLUTION INTRAVENOUS at 08:58

## 2017-01-01 RX ADMIN — SODIUM CHLORIDE, POTASSIUM CHLORIDE, SODIUM LACTATE AND CALCIUM CHLORIDE: 600; 310; 30; 20 INJECTION, SOLUTION INTRAVENOUS at 10:22

## 2017-01-01 RX ADMIN — LEUCOVORIN CALCIUM 970 MG: 200 INJECTION, POWDER, LYOPHILIZED, FOR SOLUTION INTRAMUSCULAR; INTRAVENOUS at 13:35

## 2017-01-01 RX ADMIN — SODIUM CHLORIDE, PRESERVATIVE FREE 500 UNITS: 5 INJECTION INTRAVENOUS at 12:50

## 2017-01-01 RX ADMIN — PALONOSETRON HYDROCHLORIDE 0.25 MG: 0.25 INJECTION INTRAVENOUS at 12:18

## 2017-01-01 RX ADMIN — PNEUMOCOCCAL VACCINE POLYVALENT 0.5 ML
25; 25; 25; 25; 25; 25; 25; 25; 25; 25; 25; 25; 25; 25; 25; 25; 25; 25; 25; 25; 25; 25; 25 INJECTION, SOLUTION INTRAMUSCULAR; SUBCUTANEOUS at 17:10

## 2017-01-01 RX ADMIN — FLUOROURACIL 970 MG: 50 INJECTION, SOLUTION INTRAVENOUS at 15:32

## 2017-01-01 RX ADMIN — SODIUM CHLORIDE, POTASSIUM CHLORIDE, SODIUM LACTATE AND CALCIUM CHLORIDE 9 ML/HR: 600; 310; 30; 20 INJECTION, SOLUTION INTRAVENOUS at 09:32

## 2017-01-01 RX ADMIN — KETOROLAC TROMETHAMINE 15 MG: 15 INJECTION, SOLUTION INTRAMUSCULAR; INTRAVENOUS at 02:08

## 2017-01-01 RX ADMIN — PALONOSETRON HYDROCHLORIDE 0.25 MG: 0.25 INJECTION INTRAVENOUS at 08:32

## 2017-01-01 RX ADMIN — LEUCOVORIN CALCIUM 970 MG: 200 INJECTION, POWDER, LYOPHILIZED, FOR SOLUTION INTRAMUSCULAR; INTRAVENOUS at 14:26

## 2017-01-01 RX ADMIN — SODIUM CHLORIDE, PRESERVATIVE FREE 500 UNITS: 5 INJECTION INTRAVENOUS at 13:30

## 2017-01-01 RX ADMIN — PALONOSETRON HYDROCHLORIDE 0.25 MG: 0.25 INJECTION INTRAVENOUS at 13:32

## 2017-01-01 RX ADMIN — DEXAMETHASONE SODIUM PHOSPHATE 12 MG: 10 INJECTION INTRAMUSCULAR; INTRAVENOUS at 08:33

## 2017-01-01 RX ADMIN — OCTREOTIDE ACETATE 100 MCG: 100 INJECTION, SOLUTION INTRAVENOUS; SUBCUTANEOUS at 13:19

## 2017-01-01 RX ADMIN — DEXAMETHASONE SODIUM PHOSPHATE 12 MG: 10 INJECTION, SOLUTION INTRAMUSCULAR; INTRAVENOUS at 08:56

## 2017-01-01 RX ADMIN — LEUCOVORIN CALCIUM 970 MG: 350 INJECTION, POWDER, LYOPHILIZED, FOR SOLUTION INTRAMUSCULAR; INTRAVENOUS at 15:01

## 2017-01-01 RX ADMIN — FLUOROURACIL 970 MG: 50 INJECTION, SOLUTION INTRAVENOUS at 14:37

## 2017-01-01 RX ADMIN — SODIUM CHLORIDE 500 ML: 900 INJECTION, SOLUTION INTRAVENOUS at 12:46

## 2017-01-01 RX ADMIN — LIDOCAINE HYDROCHLORIDE 10 ML: 10 INJECTION, SOLUTION INFILTRATION; PERINEURAL at 14:19

## 2017-01-01 RX ADMIN — Medication 20 ML: at 13:30

## 2017-01-01 RX ADMIN — ATROPINE SULFATE 0.25 MG: 0.4 INJECTION INTRAMUSCULAR; INTRAVENOUS; SUBCUTANEOUS at 14:18

## 2017-01-01 RX ADMIN — KETOROLAC TROMETHAMINE 15 MG: 15 INJECTION, SOLUTION INTRAMUSCULAR; INTRAVENOUS at 03:00

## 2017-01-01 RX ADMIN — Medication 10 ML: at 13:44

## 2017-01-01 RX ADMIN — DEXTROSE MONOHYDRATE 250 ML: 5 INJECTION, SOLUTION INTRAVENOUS at 08:28

## 2017-01-01 RX ADMIN — OXALIPLATIN 130 MG: 5 INJECTION, SOLUTION, CONCENTRATE INTRAVENOUS at 14:24

## 2017-01-01 RX ADMIN — PALONOSETRON HYDROCHLORIDE 0.25 MG: 0.25 INJECTION INTRAVENOUS at 13:52

## 2017-01-01 RX ADMIN — OXALIPLATIN 170 MG: 5 INJECTION, SOLUTION, CONCENTRATE INTRAVENOUS at 09:00

## 2017-01-01 RX ADMIN — OXALIPLATIN 130 MG: 5 INJECTION, SOLUTION INTRAVENOUS at 14:13

## 2017-01-01 RX ADMIN — HYDROMORPHONE HYDROCHLORIDE 1 MG: 1 INJECTION, SOLUTION INTRAMUSCULAR; INTRAVENOUS; SUBCUTANEOUS at 02:15

## 2017-01-01 RX ADMIN — FLUOROURACIL 970 MG: 50 INJECTION, SOLUTION INTRAVENOUS at 16:03

## 2017-01-01 RX ADMIN — IRINOTECAN HYDROCHLORIDE 161.68 MG: 4.3 INJECTION INTRAVENOUS at 14:22

## 2017-01-01 RX ADMIN — FLUOROURACIL 970 MG: 50 INJECTION, SOLUTION INTRAVENOUS at 10:36

## 2017-01-01 RX ADMIN — SODIUM CHLORIDE 500 ML: 9 INJECTION, SOLUTION INTRAVENOUS at 01:22

## 2017-01-01 RX ADMIN — SODIUM CHLORIDE, PRESERVATIVE FREE 500 UNITS: 5 INJECTION INTRAVENOUS at 14:40

## 2017-01-01 RX ADMIN — SODIUM CHLORIDE 1000 ML: 900 INJECTION, SOLUTION INTRAVENOUS at 13:45

## 2017-01-01 RX ADMIN — SODIUM CHLORIDE, PRESERVATIVE FREE 500 UNITS: 5 INJECTION INTRAVENOUS at 13:27

## 2017-01-01 RX ADMIN — SODIUM CHLORIDE 500 ML: 900 INJECTION, SOLUTION INTRAVENOUS at 13:07

## 2017-01-06 RX ORDER — ALPRAZOLAM 0.5 MG/1
TABLET ORAL
Qty: 60 TABLET | Refills: 1 | OUTPATIENT
Start: 2017-01-06 | End: 2017-03-09 | Stop reason: SDUPTHER

## 2017-01-11 RX ORDER — MELOXICAM 15 MG/1
TABLET ORAL
Qty: 30 TABLET | Refills: 3 | Status: SHIPPED | OUTPATIENT
Start: 2017-01-11 | End: 2017-02-14

## 2017-01-31 ENCOUNTER — APPOINTMENT (OUTPATIENT)
Dept: ONCOLOGY | Facility: CLINIC | Age: 67
End: 2017-01-31

## 2017-01-31 ENCOUNTER — APPOINTMENT (OUTPATIENT)
Dept: ONCOLOGY | Facility: HOSPITAL | Age: 67
End: 2017-01-31

## 2017-02-01 RX ORDER — WARFARIN SODIUM 1 MG/1
TABLET ORAL
Qty: 30 TABLET | Refills: 1 | Status: SHIPPED | OUTPATIENT
Start: 2017-02-01 | End: 2017-02-14

## 2017-02-01 RX ORDER — PRAVASTATIN SODIUM 20 MG
TABLET ORAL
Qty: 90 TABLET | Refills: 1 | Status: SHIPPED | OUTPATIENT
Start: 2017-02-01 | End: 2017-02-14

## 2017-02-14 ENCOUNTER — OFFICE VISIT (OUTPATIENT)
Dept: ONCOLOGY | Facility: CLINIC | Age: 67
End: 2017-02-14

## 2017-02-14 ENCOUNTER — LAB (OUTPATIENT)
Dept: ONCOLOGY | Facility: HOSPITAL | Age: 67
End: 2017-02-14

## 2017-02-14 VITALS
SYSTOLIC BLOOD PRESSURE: 124 MMHG | WEIGHT: 236.4 LBS | RESPIRATION RATE: 16 BRPM | DIASTOLIC BLOOD PRESSURE: 71 MMHG | TEMPERATURE: 97.4 F | BODY MASS INDEX: 31.85 KG/M2 | HEART RATE: 81 BPM | OXYGEN SATURATION: 98 %

## 2017-02-14 DIAGNOSIS — C79.51 METASTASIS TO SPINAL COLUMN (HCC): ICD-10-CM

## 2017-02-14 DIAGNOSIS — D69.6 ACQUIRED THROMBOCYTOPENIA (HCC): ICD-10-CM

## 2017-02-14 DIAGNOSIS — C79.51 BONE METASTASIS: ICD-10-CM

## 2017-02-14 DIAGNOSIS — G89.3 CANCER ASSOCIATED PAIN: ICD-10-CM

## 2017-02-14 DIAGNOSIS — C25.9 PANCREAS CARCINOMA (HCC): Primary | ICD-10-CM

## 2017-02-14 DIAGNOSIS — C78.7 LIVER METASTASES: ICD-10-CM

## 2017-02-14 DIAGNOSIS — L98.9 SKIN ABNORMALITY: ICD-10-CM

## 2017-02-14 DIAGNOSIS — Z45.2 ENCOUNTER FOR ADJUSTMENT OR MANAGEMENT OF VASCULAR ACCESS DEVICE: ICD-10-CM

## 2017-02-14 DIAGNOSIS — C25.9 PANCREAS CARCINOMA (HCC): ICD-10-CM

## 2017-02-14 LAB
ALBUMIN SERPL-MCNC: 3.9 G/DL (ref 3.5–5.2)
ALBUMIN/GLOB SERPL: 1.3 G/DL
ALP SERPL-CCNC: 48 U/L (ref 39–117)
ALT SERPL W P-5'-P-CCNC: 31 U/L (ref 1–41)
ANION GAP SERPL CALCULATED.3IONS-SCNC: 10.9 MMOL/L
AST SERPL-CCNC: 37 U/L (ref 1–40)
BASOPHILS # BLD AUTO: 0.01 10*3/MM3 (ref 0–0.2)
BASOPHILS NFR BLD AUTO: 0.3 % (ref 0–1.5)
BILIRUB SERPL-MCNC: 0.5 MG/DL (ref 0.1–1.2)
BUN BLD-MCNC: 16 MG/DL (ref 8–23)
BUN/CREAT SERPL: 21.3 (ref 7–25)
CALCIUM SPEC-SCNC: 9.2 MG/DL (ref 8.6–10.5)
CHLORIDE SERPL-SCNC: 103 MMOL/L (ref 98–107)
CO2 SERPL-SCNC: 26.1 MMOL/L (ref 22–29)
CREAT BLD-MCNC: 0.75 MG/DL (ref 0.76–1.27)
DEPRECATED RDW RBC AUTO: 52.1 FL (ref 37–54)
EOSINOPHIL # BLD AUTO: 0.15 10*3/MM3 (ref 0–0.7)
EOSINOPHIL NFR BLD AUTO: 4 % (ref 0.3–6.2)
ERYTHROCYTE [DISTWIDTH] IN BLOOD BY AUTOMATED COUNT: 14.4 % (ref 11.5–14.5)
GFR SERPL CREATININE-BSD FRML MDRD: 104 ML/MIN/1.73
GLOBULIN UR ELPH-MCNC: 2.9 GM/DL
GLUCOSE BLD-MCNC: 164 MG/DL (ref 65–99)
HCT VFR BLD AUTO: 40.4 % (ref 40.4–52.2)
HGB BLD-MCNC: 13.1 G/DL (ref 13.7–17.6)
IMM GRANULOCYTES # BLD: 0.01 10*3/MM3 (ref 0–0.03)
IMM GRANULOCYTES NFR BLD: 0.3 % (ref 0–0.5)
LYMPHOCYTES # BLD AUTO: 0.41 10*3/MM3 (ref 0.9–4.8)
LYMPHOCYTES NFR BLD AUTO: 10.8 % (ref 19.6–45.3)
MCH RBC QN AUTO: 31.8 PG (ref 27–32.7)
MCHC RBC AUTO-ENTMCNC: 32.4 G/DL (ref 32.6–36.4)
MCV RBC AUTO: 98.1 FL (ref 79.8–96.2)
MONOCYTES # BLD AUTO: 0.3 10*3/MM3 (ref 0.2–1.2)
MONOCYTES NFR BLD AUTO: 7.9 % (ref 5–12)
NEUTROPHILS # BLD AUTO: 2.91 10*3/MM3 (ref 1.9–8.1)
NEUTROPHILS NFR BLD AUTO: 76.7 % (ref 42.7–76)
NRBC BLD MANUAL-RTO: 0 /100 WBC (ref 0–0)
PLATELET # BLD AUTO: 83 10*3/MM3 (ref 140–500)
PMV BLD AUTO: 12.1 FL (ref 6–12)
POTASSIUM BLD-SCNC: 3.9 MMOL/L (ref 3.5–5.2)
PROT SERPL-MCNC: 6.8 G/DL (ref 6–8.5)
RBC # BLD AUTO: 4.12 10*6/MM3 (ref 4.6–6)
SODIUM BLD-SCNC: 140 MMOL/L (ref 136–145)
WBC NRBC COR # BLD: 3.79 10*3/MM3 (ref 4.5–10.7)

## 2017-02-14 PROCEDURE — 85025 COMPLETE CBC W/AUTO DIFF WBC: CPT | Performed by: INTERNAL MEDICINE

## 2017-02-14 PROCEDURE — 36415 COLL VENOUS BLD VENIPUNCTURE: CPT

## 2017-02-14 PROCEDURE — 80053 COMPREHEN METABOLIC PANEL: CPT | Performed by: INTERNAL MEDICINE

## 2017-02-14 PROCEDURE — 99213 OFFICE O/P EST LOW 20 MIN: CPT | Performed by: INTERNAL MEDICINE

## 2017-02-14 PROCEDURE — 86301 IMMUNOASSAY TUMOR CA 19-9: CPT | Performed by: INTERNAL MEDICINE

## 2017-02-14 NOTE — PROGRESS NOTES
REASONS FOR FOLLOWUP:    Metastatic pancreatic cancer to liver and sacrum.  The patient has undergone successful plan of radiation treatment to the sacrum on 2 different occasions and stereotactic treatment for a liver metastasis, solitary.  The patient also has undergone therapy with Eloxatin/Xeloda successfully and actually as per 07/01/2016 the patient has reached the lowest level of  that we ever measured.  On clinical grounds, the patient has improved appetite, stable weight; has gained performance status and we have documented radiologically stability of the areas of metastasis.  Given these circumstances, the patient will remain off any chemotherapy or radiation treatment for the time being and he will require continuation of pain management.         HISTORY OF PRESENT ILLNESS: Since the previous visit the patient has continued gaining control back in his life, having lesser and lesser degree of pain through the day requiring only 3 or 4 Percocet a day and almost no infusion required through the night in regard to his pain pump. The patient has gained weight. His appetite and taste is excellent. He has minimal if any constipation, receiving MiraLAX on a daily basis.  Urination is ongoing with no difficulty. The patient is not having any cardiorespiratory symptomatology he has gained performance status to the point that he is going back to doing his hobbies and doing rides in the car that he has not done in months.  He continues seeing Dr. Shaheed Bertrand to further adjust pain medication accordingly and to receive his prescriptions for narcotics.    The area of extravasation of oxaliplatin in the left forearm has remained stable and remains with some induration in the skin but no ulceration. The patient has not had any fevers, chills, infections, no respiratory symptomatology and no new areas of pain. ECOG performance status has improved from 2 to 3 to 1. The patient feels dramatically  better.              PAST MEDICAL HISTORY:     1.  Morbid obesity.     2. History of hypertension.     3. Hyperlipidemia.     4. He has no history of diabetes, neither history of thyroid disease, coronary disease, and cardiac arrhythmia.    5.   He had a syncopal episode in December 2014.  Conclusion was that the patient had an episode of low blood pressure and these medicines were adjusted accordingly.     6. He has no history of colonoscopy in the past.     7. History of enlarged prostate with a normal PSA.     8. 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     9.   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  His diplopia improved spontaneously.     10. He has no history of previous surgeries.         HEMATOLOGIC/ONCOLOGIC HISTORY: History from previous dates can be found in the separate document.         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  We adjusted down the E  loxatin to 170 mg total, given his minor leukopenia and minor thrombocytopenia.          His pain medication will remain the same.  His doxepin will remain the same.  In regard to extravasation   of Eloxatin in the left upper extremity, local therapy will remain the same.      The patient is next seen in the office March 07, 2016 stable for all aspects.  We plan to continue chemotherapy at the same doses at this time and scan him in the next several months and follow-up      ALLERGIES:  No known drug allergies.         MEDICATIONS:  The current medication list was reviewed with the patient and updated in the EMR this date per the medical assistant.  Medication dosages and frequencies were confirmed to be accurate.     SOCIAL   HISTORY: The patient used to work  for [employer] for a long time, most of his life.  He retired many years ago.  He stays at home busy doing many activities, including fishing, mowing, gardening, and many other chores.  He was exposed to chemicals w  h  en he worked at [employer], but he used to have protective equipment. He is not aware of any other coworkers who have been exposed to anything, or diagnosed with cancer.  He has 3 stepchildren in good health.  He used to smoke for 20 years; he quit   30 years ago.  He used to smoke a pack of cigarettes a day.  He does not drink any alcohol.         FAMILY HISTORY:  His mother  as a consequence of primary liver cancer and she was diagnosed in .  He had a sister who had multiple myeloma and received care at [facility].  His wife and the children do not have any other significant illnesses.           REVIEW OF SYSTEMS:   General: no fever, chills, fatigue, weight changes, or lack of appetite.  Eyes: no epiphora, xerophthalmia,conjunctivitis, pain, glaucoma, blurred vision, blindness, secretion, photophobia, proptosis, diplopia.  Ears: no otorrhea, tinnitus, otorrhagia, deafness, pain, vertigo.  Nose: no rhinorrhea, epistaxis, alteration in perception of odors, sinuses pressure.  Mouth: no alteration in gums or teeth,  ulcers, no difficulty with mastication or deglut ion, no odynophagia.  Neck: no masses or pain, no thyroid alterations, no pain in muscles or arteries, no carotid odynia, no crepitation.  Respiratory: no cough, sputum production, dyspnea, trepopnea, pleuritic pain, hemoptysis.  Heart: no syncope, irregularity, palpitations, angina, orthopnea, paroxysmal nocturnal dyspnea.  Vascular Venous: no tenderness,edema, palpable cords, postphlebitic syndrome, skin changes or ulcerations.  Vascular Arterial: no distal ischemia, claudication, gangrene, neuropathic ischemic pain, skin ulcers, paleness or cyanosis.  GI: no dysphagia, odynophagia, no regurgitation, no heartburn,no  indigestion,no nausea,no vomiting,no hematemesis ,no melena,no jaundice,no distention, no obstipation,no enterorrhagia,no proctalgia,no anal  lesions, nochanges in bowel habits.  : no frequency, hesitancy, hematuria, discharge, pain.  Musculoskeletal: no muscle or tendon pain or inflammation, joint pain, edema, functional limitation as expected given his bone mets and neuropathic pain,no fasciculations, mass.  Neurologic: no headache, seizures, alterations on Craneal nerves, no motor or senssory deficit, normal coordination, no alteration in memory, orientation, calculation,writting, verbal or written language.  Skin: no rashes, pruritus , no new changes in his left forearm lesion.  Psychiatric: no anxiety, depression, agitation, delusions, proper insight.        VITAL SIGNS:   Vitals:    02/14/17 1501   BP: 124/71   Pulse: 81   Resp: 16   Temp: 97.4 °F (36.3 °C)   TempSrc: Oral   SpO2: 98%   Weight: 236 lb 6.4 oz (107 kg)          PHYSICAL EXAMINATION:      GENERAL:  White male in no acute distress, lying on the bed on his side.  The degree of pain intensity was no more than 2 out of 10.     SKIN:  Lesion in the skin in his forearm anteriorly on the left side remains indurated with no pain, no eryth  ema, no local inflammation with normal range-of-motion in the flexion and extension of the left elbow.  He had no edema in his hand.  The area of infiltration of the skin measures close to 2 x 2 cm in size.  The rest of the skin examination reveals mild erythema with occasional papular lesion.       EYES:  Pupils were equal and reactive to light and accommodation.  Extraocular movements were normal.     MOUTH:  Oral mucosals were normal.    NECK:  Disclosed no palpable cervical adenopathies, no thyroid enlargement. CHEST:  Lungs were clear with good breath sounds bilaterally.  No wheezing, crackles, rhonchi or rubs.  His heart was regular with occasional premature contractions.     ABDOMEN:  Soft, nontender, no liver  enlargement.  No splenomegaly.  No palpable abdominal masses.  No ascites.  No pain.     MUSCULOSKELETAL:  Disclosed minimal tenderness in the sacrum.  No soft tissue mass, no deformity, no local increase in the temperature.  No cauda equina syndrome.     EXTREMITIES: Disclosed no tenderness, no edema.  Left upper extremity as already described-improved from previous  NEUROLOGICAL:  Showed a patient that was fully awake, alert, oriented to time and place, able to carry a normal conversation, walking with some degree of difficulty given the intensity of the pain in the sacrum.         LABORATORY DATA:       CHEST ABDOMEN PELVIS WITH CONTRAST      HISTORY: 66-year-old male for follow-up metastatic pancreatic cancer      TECHNIQUE: Spiral axial CT imaging of the chest, abdomen and pelvis was  performed at 3 mm intervals throughout. Intravenous contrast was  administered for this imaging.      COMPARISON: Correlation is made with contrasted CT imaging of the chest,  abdomen and pelvis 10/05/2016.      CHEST: Heart and great vessels are stable. Left-sided Mediport  terminates in the left brachiocephalic vein near the confluence,  slightly more proximal along the vein than previous study. No new mass  or adenopathy is present within the mediastinum or at the hilar levels.  The chest wall is stable. I see no bone lesions. Lungs are free of acute  infiltrate. There is again some atelectasis at the left base adjacent to  elevated left hemidiaphragm. No new suspicious nodule or mass is  present. There is no pleural effusion.      ABDOMEN PELVIS: Treated liver lesion appears stable compared with prior  imaging. A representative oblique AP dimension is stable at 7.3 cm. No  new liver lesions are identified. There is some minimal cholelithiasis.  Gallbladder is otherwise unremarkable. There is no biliary ductal  dilatation. Spleen is normal in size and configuration. The pancreas  appears unremarkable. An enlarged node just  cephalad to the pancreas and  at the portacaval level measures 2.9 cm long axis, stable from previous  imaging. No new adenopathy is identified in the region. The adrenal  glands and kidneys are stable. Bowel is unremarkable. I see no new  enlarged lymph nodes in the abdomen or pelvis. Destructive osteolytic  lesion at the sacrum is unchanged. Much smaller lesion at the L5 level  appears grossly stable in size. The lesion appears somewhat more  osteolytic with internal sclerotic components seen on prior study no  longer apparent. No new bone lesions are apparent.      IMPRESSION:  1. CT chest demonstrate no evidence for metastatic disease.  2. A treated lesion at the left lobe of the liver appears grossly stable  in size and configuration. No new lesions are present within the liver.  Enlarged nodes at the hans hepatis appear stable. A large osteolytic  destructive lesion at the sacrum with associated pathologic fracture  appear stable. A smaller lesion at L5 is somewhat more conspicuous due  to osteolytic components, however grossly stable in size. No new bone  lesions are apparent.       3.79 (L)    RBC 4.60 - 6.00 10*6/mm3 4.12 (L)   Hemoglobin 13.7 - 17.6 g/dL 13.1 (L)   Hematocrit 40.4 - 52.2 % 40.4   MCV 79.8 - 96.2 fL 98.1 (H)   MCH 27.0 - 32.7 pg 31.8   MCHC 32.6 - 36.4 g/dL 32.4 (L)   RDW 11.5 - 14.5 % 14.4   RDW-SD 37.0 - 54.0 fl 52.1   MPV 6.0 - 12.0 fL 12.1 (H)   Platelets 140 - 500 10*3/mm3 83 (L)   Neutrophil % 42.7 - 76.0 % 76.7 (H)   Lymphocyte % 19.6 - 45.3 % 10.8 (L)   Monocyte % 5.0 - 12.0 % 7.9   Eosinophil % 0.3 - 6.2 % 4.0   Basophil % 0.0 - 1.5 % 0.3   Immature Grans % 0.0 - 0.5 % 0.3   Neutrophils, Absolute 1.90 - 8.10 10*3/mm3 2.91   Lymphocytes, Absolute 0.90 - 4.80 10*3/mm3 0.41 (L)   Monocytes, Absolute 0.20 - 1.20 10*3/mm3 0.30   Eosinophils, Absolute 0.00 - 0.70 10*3/mm3 0.15   Basophils, Absolute 0.00 - 0.20 10*3/mm3 0.01   Immature Grans, Absolute 0.00 - 0.03 10*3/mm3 0.01   nRBC 0.0  - 0.0 /100 WBC 0.0   Resulting Agency   [facility]      Specimen Collected: 02/14/17  2:48 PM Last Resulted: 02/14/17  3:04 PM          ASSESSMENT:     Overall I do believe that the patient has the following problems:  1. Metastatic pancreatic cancer to the sacrum and to the liver. The status of disease is very acceptable with stability of his disease  clinically no symptoms related to his solitary liver metastasis that had received stereotactic radiation therapy and pain related to his sacral metastasis that has received radiation therapy on 2 different occasions.  We have measured the lowest level of his  ever recorded on him and all these are indicators of disease stability and being quiet.  He has not developed any other sites of disease at any level and since discontinuation of chemotherapy treatment, the patient’s life status has improved; ECOG performance status has improved and he feels better.  Therefore, under these circumstances, I do not recommend any further chemotherapy or radiation treatment.     Since the placement of the pain pump for Fentanyl infusion in epidural fashion by Dr. Shaheed Bertrand and discontinuation of the large dose of Fentanyl patch and Percocet, the patient is now receiving 4 Percocet a day at the most. He feels dramatically better. Functionality has improved, somnolence, constipation, perspiration and fogginess has dramatically improved and he has taken back control of his life. Pain intensity is very minimal. The patient us fully functional. He is going in the car with his wife on a daily basis. He is walking through the house, he is eating better, he is tasting food better and he is back to some better baseline. Quality of life has dramatically improved.       RECOMMENDATIONS:  1. 1. I advised the patient to proceed with a new CT scan of the chest, abdomen and pelvis in 7 weeks and review him back in 8 weeks.   2. I advised him to have a CBC chemistry profile, CA 19-9 at the  same time that he had a CT scan of the chest.   3. He will have his port flushed in 7 weeks.   4. He will continue following up with Dr. Shaheed Bertrand in regard to pain control that has done wonders in regard to proper functionality and allowed him to do much better than before.   5. His sugar is minimally elevated today at 160. He is eating cookies like a devil. I advised him to modify this to minimize any potentiality for developing diabetes.   6. Encouraged him to remain physically active.   7. I reviewed the rest of his medicines and nothing needs to be changed under the present circumstances.   8. He will be reviewed again by Dr. Bertrand according to his own schedule and followup.

## 2017-02-16 LAB — CANCER AG19-9 SERPL-ACNC: 74 U/ML (ref 0–35)

## 2017-03-09 RX ORDER — DOXEPIN HYDROCHLORIDE 25 MG/1
CAPSULE ORAL
Qty: 30 CAPSULE | Refills: 6 | Status: SHIPPED | OUTPATIENT
Start: 2017-03-09 | End: 2017-04-28 | Stop reason: SDUPTHER

## 2017-03-10 RX ORDER — ALPRAZOLAM 0.5 MG/1
TABLET ORAL
Qty: 60 TABLET | Refills: 1 | OUTPATIENT
Start: 2017-03-10 | End: 2017-04-10 | Stop reason: SDUPTHER

## 2017-04-10 RX ORDER — ALPRAZOLAM 0.5 MG/1
0.5 TABLET ORAL 2 TIMES DAILY PRN
Qty: 60 TABLET | Refills: 1 | OUTPATIENT
Start: 2017-04-10 | End: 2017-06-07 | Stop reason: SDUPTHER

## 2017-04-10 RX ORDER — WARFARIN SODIUM 1 MG/1
TABLET ORAL
Qty: 30 TABLET | Refills: 1 | Status: SHIPPED | OUTPATIENT
Start: 2017-04-10 | End: 2017-04-18

## 2017-04-11 ENCOUNTER — HOSPITAL ENCOUNTER (OUTPATIENT)
Dept: PET IMAGING | Facility: HOSPITAL | Age: 67
Discharge: HOME | End: 2017-04-11
Attending: INTERNAL MEDICINE | Admitting: INTERNAL MEDICINE

## 2017-04-11 ENCOUNTER — INFUSION (OUTPATIENT)
Dept: ONCOLOGY | Facility: HOSPITAL | Age: 67
End: 2017-04-11

## 2017-04-11 DIAGNOSIS — C79.51 METASTASIS TO SPINAL COLUMN (HCC): ICD-10-CM

## 2017-04-11 DIAGNOSIS — C25.9 PANCREAS CARCINOMA (HCC): Primary | ICD-10-CM

## 2017-04-11 DIAGNOSIS — C79.51 BONE METASTASIS: ICD-10-CM

## 2017-04-11 DIAGNOSIS — Z45.2 ENCOUNTER FOR ADJUSTMENT OR MANAGEMENT OF VASCULAR ACCESS DEVICE: ICD-10-CM

## 2017-04-11 DIAGNOSIS — C25.9 PANCREAS CARCINOMA (HCC): ICD-10-CM

## 2017-04-11 LAB
ALBUMIN SERPL-MCNC: 4.1 G/DL (ref 3.5–5.2)
ALBUMIN/GLOB SERPL: 1.4 G/DL (ref 1.1–2.4)
ALP SERPL-CCNC: 43 U/L (ref 38–116)
ALT SERPL W P-5'-P-CCNC: 28 U/L (ref 0–41)
ANION GAP SERPL CALCULATED.3IONS-SCNC: 14 MMOL/L
AST SERPL-CCNC: 36 U/L (ref 0–40)
BASOPHILS # BLD AUTO: 0.04 10*3/MM3 (ref 0–0.1)
BASOPHILS NFR BLD AUTO: 0.9 % (ref 0–1.1)
BILIRUB SERPL-MCNC: 0.6 MG/DL (ref 0.1–1.2)
BUN BLD-MCNC: 13 MG/DL (ref 6–20)
BUN/CREAT SERPL: 16.7 (ref 7.3–30)
CALCIUM SPEC-SCNC: 9.1 MG/DL (ref 8.5–10.2)
CANCER AG19-9 SERPL-ACNC: 74.3 U/ML
CHLORIDE SERPL-SCNC: 100 MMOL/L (ref 98–107)
CO2 SERPL-SCNC: 26 MMOL/L (ref 22–29)
CREAT BLD-MCNC: 0.78 MG/DL (ref 0.7–1.3)
CREAT BLDA-MCNC: 0.7 MG/DL (ref 0.6–1.3)
DEPRECATED RDW RBC AUTO: 48.4 FL (ref 37–49)
EOSINOPHIL # BLD AUTO: 0.2 10*3/MM3 (ref 0–0.36)
EOSINOPHIL NFR BLD AUTO: 4.3 % (ref 1–5)
ERYTHROCYTE [DISTWIDTH] IN BLOOD BY AUTOMATED COUNT: 14.1 % (ref 11.7–14.5)
GFR SERPL CREATININE-BSD FRML MDRD: 100 ML/MIN/1.73
GLOBULIN UR ELPH-MCNC: 2.9 GM/DL (ref 1.8–3.5)
GLUCOSE BLD-MCNC: 105 MG/DL (ref 74–124)
HCT VFR BLD AUTO: 42.2 % (ref 40–49)
HGB BLD-MCNC: 13.8 G/DL (ref 13.5–16.5)
IMM GRANULOCYTES # BLD: 0 10*3/MM3 (ref 0–0.03)
IMM GRANULOCYTES NFR BLD: 0 % (ref 0–0.5)
LYMPHOCYTES # BLD AUTO: 0.73 10*3/MM3 (ref 1–3.5)
LYMPHOCYTES NFR BLD AUTO: 15.7 % (ref 20–49)
MCH RBC QN AUTO: 30.9 PG (ref 27–33)
MCHC RBC AUTO-ENTMCNC: 32.7 G/DL (ref 32–35)
MCV RBC AUTO: 94.4 FL (ref 83–97)
MONOCYTES # BLD AUTO: 0.39 10*3/MM3 (ref 0.25–0.8)
MONOCYTES NFR BLD AUTO: 8.4 % (ref 4–12)
NEUTROPHILS # BLD AUTO: 3.29 10*3/MM3 (ref 1.5–7)
NEUTROPHILS NFR BLD AUTO: 70.7 % (ref 39–75)
NRBC BLD MANUAL-RTO: 0 /100 WBC (ref 0–0)
PLATELET # BLD AUTO: 90 10*3/MM3 (ref 150–375)
PMV BLD AUTO: 11.7 FL (ref 8.9–12.1)
POTASSIUM BLD-SCNC: 3.9 MMOL/L (ref 3.5–4.7)
PROT SERPL-MCNC: 7 G/DL (ref 6.3–8)
RBC # BLD AUTO: 4.47 10*6/MM3 (ref 4.3–5.5)
SODIUM BLD-SCNC: 140 MMOL/L (ref 134–145)
WBC NRBC COR # BLD: 4.65 10*3/MM3 (ref 4–10)

## 2017-04-11 PROCEDURE — 80053 COMPREHEN METABOLIC PANEL: CPT | Performed by: NURSE PRACTITIONER

## 2017-04-11 PROCEDURE — 86301 IMMUNOASSAY TUMOR CA 19-9: CPT | Performed by: NURSE PRACTITIONER

## 2017-04-11 PROCEDURE — 96523 IRRIG DRUG DELIVERY DEVICE: CPT | Performed by: INTERNAL MEDICINE

## 2017-04-11 PROCEDURE — 0 IOPAMIDOL 61 % SOLUTION: Performed by: INTERNAL MEDICINE

## 2017-04-11 PROCEDURE — 25010000002 HEPARIN FLUSH (PORCINE) 100 UNIT/ML SOLUTION: Performed by: INTERNAL MEDICINE

## 2017-04-11 PROCEDURE — 74177 CT ABD & PELVIS W/CONTRAST: CPT

## 2017-04-11 PROCEDURE — 85025 COMPLETE CBC W/AUTO DIFF WBC: CPT | Performed by: NURSE PRACTITIONER

## 2017-04-11 PROCEDURE — 36415 COLL VENOUS BLD VENIPUNCTURE: CPT | Performed by: NURSE PRACTITIONER

## 2017-04-11 PROCEDURE — 0 DIATRIZOATE MEGLUMINE & SODIUM PER 1 ML: Performed by: INTERNAL MEDICINE

## 2017-04-11 PROCEDURE — 82565 ASSAY OF CREATININE: CPT

## 2017-04-11 PROCEDURE — 71260 CT THORAX DX C+: CPT

## 2017-04-11 RX ORDER — SODIUM CHLORIDE 0.9 % (FLUSH) 0.9 %
10 SYRINGE (ML) INJECTION AS NEEDED
Status: DISCONTINUED | OUTPATIENT
Start: 2017-04-11 | End: 2017-04-11 | Stop reason: HOSPADM

## 2017-04-11 RX ORDER — SODIUM CHLORIDE 0.9 % (FLUSH) 0.9 %
10 SYRINGE (ML) INJECTION AS NEEDED
Status: CANCELLED | OUTPATIENT
Start: 2017-04-11

## 2017-04-11 RX ADMIN — IOPAMIDOL 85 ML: 612 INJECTION, SOLUTION INTRAVENOUS at 14:02

## 2017-04-11 RX ADMIN — SODIUM CHLORIDE, PRESERVATIVE FREE 500 UNITS: 5 INJECTION INTRAVENOUS at 13:11

## 2017-04-11 RX ADMIN — Medication 10 ML: at 13:10

## 2017-04-11 RX ADMIN — DIATRIZOATE MEGLUMINE AND DIATRIZOATE SODIUM 30 ML: 660; 100 LIQUID ORAL; RECTAL at 13:15

## 2017-04-11 NOTE — PROGRESS NOTES
Left port accessed with powerloc needle for CT.  Unable to obtain blood return.  He has had blood return from port in some time he says.  CT cannot use port without blood return.  Port flushed per protocol and deaccessed.  22G intima placed in right AC and labs drawn.  IV left in place for CT scan.

## 2017-04-18 ENCOUNTER — APPOINTMENT (OUTPATIENT)
Dept: LAB | Facility: HOSPITAL | Age: 67
End: 2017-04-18

## 2017-04-18 ENCOUNTER — OFFICE VISIT (OUTPATIENT)
Dept: ONCOLOGY | Facility: CLINIC | Age: 67
End: 2017-04-18

## 2017-04-18 VITALS
DIASTOLIC BLOOD PRESSURE: 73 MMHG | HEART RATE: 69 BPM | RESPIRATION RATE: 14 BRPM | OXYGEN SATURATION: 97 % | HEIGHT: 72 IN | TEMPERATURE: 98.5 F | WEIGHT: 234.6 LBS | BODY MASS INDEX: 31.77 KG/M2 | SYSTOLIC BLOOD PRESSURE: 114 MMHG

## 2017-04-18 DIAGNOSIS — T45.1X5A NEUROPATHY DUE TO CHEMOTHERAPEUTIC DRUG (HCC): ICD-10-CM

## 2017-04-18 DIAGNOSIS — C25.9 PANCREAS CARCINOMA (HCC): Primary | ICD-10-CM

## 2017-04-18 DIAGNOSIS — R16.1 SPLENOMEGALY: ICD-10-CM

## 2017-04-18 DIAGNOSIS — G89.3 CANCER ASSOCIATED PAIN: ICD-10-CM

## 2017-04-18 DIAGNOSIS — Z45.2 ENCOUNTER FOR ADJUSTMENT OR MANAGEMENT OF VASCULAR ACCESS DEVICE: ICD-10-CM

## 2017-04-18 DIAGNOSIS — C79.51 BONE METASTASIS: ICD-10-CM

## 2017-04-18 DIAGNOSIS — G62.0 NEUROPATHY DUE TO CHEMOTHERAPEUTIC DRUG (HCC): ICD-10-CM

## 2017-04-18 DIAGNOSIS — C78.7 LIVER METASTASES: ICD-10-CM

## 2017-04-18 DIAGNOSIS — D69.6 ACQUIRED THROMBOCYTOPENIA (HCC): ICD-10-CM

## 2017-04-18 DIAGNOSIS — C79.51 METASTASIS TO SPINAL COLUMN (HCC): ICD-10-CM

## 2017-04-18 PROCEDURE — G0463 HOSPITAL OUTPT CLINIC VISIT: HCPCS | Performed by: INTERNAL MEDICINE

## 2017-04-18 PROCEDURE — 99214 OFFICE O/P EST MOD 30 MIN: CPT | Performed by: INTERNAL MEDICINE

## 2017-04-18 NOTE — PROGRESS NOTES
REASONS FOR FOLLOWUP:    Metastatic pancreatic cancer to liver and sacrum.  The patient has undergone successful plan of radiation treatment to the sacrum on 2 different occasions and stereotactic treatment for a liver metastasis, solitary.  The patient also has undergone therapy with Eloxatin/Xeloda successfully.    HISTORY OF PRESENT ILLNESS:   This patient is here today in company of his wife, stating that he continues doing relatively well from the point of view of his pancreatic cancer with liver and spinal metastasis. He has been off chemotherapy for almost 6 months. He continues doing well from the point of view of pain control and he has seen Dr. Shaheed Bertrand for this. His pump has been recently refilled with new pain medication. He is only taking 4 pain pills a day and he feels well. His appetite is good. His bowel function is normal. No nausea. No vomiting. No abdominal pain. No jaundice. Normal urination. Normal defecation. No incontinence. No cardiorespiratory symptomatology. No modification in the intensity of his bone pain. He remains functional, capable of walking through the house and taking rides in the car. 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 His weight remains stable.     His port flushes well but does not allow us to retrieve any blood.              PAST MEDICAL HISTORY:     1.  Morbid obesity.     2. History of hypertension.     3. Hyperlipidemia.     4. He has no history of diabetes, neither history of thyroid disease, coronary disease, and cardiac arrhythmia.    5.   He had a syncopal episode in December 2014.  Conclusion was that the patient had an episode of low blood pressure and these medicines were adjusted accordingly.     6. He has no history of colonoscopy in the past.     7. History of enlarged prostate with a normal PSA.     8. History of multiple episodes of acrochordons  in the neck and groin areas that have been present for many years.     9.   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  His diplopia improved spontaneously.     10. He has no history of previous surgeries.         HEMATOLOGIC/ONCOLOGIC HISTORY: History from previous dates can be found in the separate document.         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  We adjusted down the E  loxatin to 170 mg total, given his minor leukopenia and minor thrombocytopenia.          His pain medication will remain the same.  His doxepin will remain the same.  In regard to extravasation   of Eloxatin in the left upper extremity, local therapy will remain the same.      The patient is next seen in the office March 07, 2016 stable for all aspects.  We plan to continue chemotherapy at the same doses at this time and scan him in the next several months and follow-up      ALLERGIES:  No known drug allergies.         MEDICATIONS:  The current medication list was reviewed with the patient and updated in the EMR this date per the medical assistant.  Medication dosages and frequencies were confirmed to be accurate.     SOCIAL   HISTORY: The patient used to work for [employer] for a long time, most of his life.  He retired many years ago.  He stays at home busy doing many activities, including fishing, mowing, gardening, and many other chores.  He was exposed to chemicals w  h  en he worked at [employer], but he used to have protective equipment. He is not aware of any other coworkers who have been exposed to anything, or diagnosed with cancer.  He has 3 stepchildren in good health.  He used to smoke for 20 years; he quit   30 years ago.  He used to smoke a pack of cigarettes a day.  He does not drink any  alcohol.         FAMILY HISTORY:  His mother  as a consequence of primary liver cancer and she was diagnosed in .  He had a sister who had multiple myeloma and received care at [facility].  His wife and the children do not have any other significant illnesses.           REVIEW OF SYSTEMS:   General: no fever, chills, fatigue, weight changes, or lack of appetite.  Eyes: no epiphora, xerophthalmia,conjunctivitis, pain, glaucoma, blurred vision, blindness, secretion, photophobia, proptosis, diplopia.  Ears: no otorrhea, tinnitus, otorrhagia, deafness, pain, vertigo.  Nose: no rhinorrhea, epistaxis, alteration in perception of odors, sinuses pressure.  Mouth: no alteration in gums or teeth,  ulcers, no difficulty with mastication or deglut ion, no odynophagia.  Neck: no masses or pain, no thyroid alterations, no pain in muscles or arteries, no carotid odynia, no crepitation.  Respiratory: no cough, sputum production, dyspnea, trepopnea, pleuritic pain, hemoptysis.  Heart: no syncope, irregularity, palpitations, angina, orthopnea, paroxysmal nocturnal dyspnea.  Vascular Venous: no tenderness,edema, palpable cords, postphlebitic syndrome, skin changes or ulcerations.  Vascular Arterial: no distal ischemia, claudication, gangrene, neuropathic ischemic pain, skin ulcers, paleness or cyanosis.  GI: no dysphagia, odynophagia, no regurgitation, no heartburn,no indigestion,no nausea,no vomiting,no hematemesis ,no melena,no jaundice,no distention, no obstipation,no enterorrhagia,no proctalgia,no anal  lesions, nochanges in bowel habits.  : no frequency, hesitancy, hematuria, discharge, pain.  Musculoskeletal: no muscle or tendon pain or inflammation, joint pain, edema, functional limitation as expected given his bone mets and neuropathic pain,no fasciculations, mass.  Neurologic: no headache, seizures, alterations on Craneal nerves, no motor or senssory deficit, normal coordination, no alteration in memory,  orientation, calculation,writting, verbal or written language.  Skin: no rashes, pruritus , no new changes in his left forearm lesion.  Psychiatric: no anxiety, depression, agitation, delusions, proper insight.        VITAL SIGNS:   There were no vitals filed for this visit.       PHYSICAL EXAMINATION:      GENERAL:  White male in no acute distress, lying on the bed on his side.  The degree of pain intensity was no more than 2 out of 10.     SKIN:  Lesion in the skin in his forearm anteriorly on the left side remains indurated with no pain, no eryth  ema, no local inflammation with normal range-of-motion in the flexion and extension of the left elbow.  He had no edema in his hand.  The area of infiltration of the skin measures close to 2 x 2 cm in size.  The rest of the skin examination reveals mild erythema with occasional papular lesion.       EYES:  Pupils were equal and reactive to light and accommodation.  Extraocular movements were normal.     MOUTH:  Oral mucosals were normal.    NECK:  Disclosed no palpable cervical adenopathies, no thyroid enlargement. CHEST:  Lungs were clear with good breath sounds bilaterally.  No wheezing, crackles, rhonchi or rubs.  His heart was regular with occasional premature contractions.     ABDOMEN:  Soft, nontender, nodular  liver enlargement 4 cm brcm,.  No splenomegaly.  No palpable abdominal masses.  No ascites.  No pain.     MUSCULOSKELETAL:  Disclosed minimal tenderness in the sacrum.  No soft tissue mass, no deformity, no local increase in the temperature.  No cauda equina syndrome.     EXTREMITIES: Disclosed no tenderness, no edema.  Left upper extremity as already described-improved from previous  NEUROLOGICAL:  Showed a patient that was fully awake, alert, oriented to time and place, able to carry a normal conversation, walking with some degree of difficulty given the intensity of the pain in the sacrum.         LABORATORY DATA: CT:     There are multiple new liver  metastases within the medial hepatic  segment adjacent to the treated metastasis within the lateral hepatic  segment. There is a new 9 mm right basilar pulmonary nodule which likely  represents a metastasis as well.  2. Interval increase in size of a 2.8 cm lytic metastasis at L5. Large  complex mixed lytic and sclerotic sacral metastasis appears largely  unchanged.      This report was finalized on 4/14/2017 3:38 PM by Dr. Neyda Brooks MD  Component      Latest Ref Rng & Units 10/5/2016 11/29/2016 2/14/2017 4/11/2017   CA 19-9      U/mL 52.6 80 (H) 74 (H) 74.3       ASSESSMENT:     Overall I do believe that the patient has the following problems:  1. Metastatic pancreatic cancer to the sacrum and to the liver. After being off chemotherapy for almost 6 months, the patients disease is starting to regrow. That is not surprising to this and I think he needs to go back into chemotherapy. With the background of peripheral neuropathy due to the use of Eloxatin, I am afraid of going into this dosing of this medicine at this time because I could make the neuropathy worse. On the other hand I think the patients Xeloda also was very beneficial and I do believe 5-FU/leucovorin on weekly basis will be a good choice at least for the next 5 weeks, allowing me to check him back in 4 weeks and rechecking another tumor marker to see if we can document any further improvement of this. If the tumor marker improves, we can stick to the 5-FU/leucovorin. If the tumor marker does not get better, I think we can give him a dose of Eloxatin once a month along with 5-FU and leucovorin. His thrombocytopenia is not a limiting factor related to the chemotherapy treatment given the fact that we know that he has splenomegaly associated likely with a component of cirrhosis associated with his previous chemotherapy medicines before.       The pain control is not an issue. He has excellent control at this time and Dr. Shaheed Bertrand will continue  managing this.     In regard to his port function, the port flushes well but does not allow us to retrieve any blood. This is not a limiting factor in regard to the continuation of IV chemotherapy.     2. In regard to side effects of 5-FU/leucovorin, the patient is aware that this medicine can produce nausea, vomiting, anemia, leukopenia, thrombocytopenia, diarrhea, mucositis. Also, he is aware of photosensitivity and if he gets exposed to the sunshine, I advised him to wear a hat and to use some protection.     He is ready to proceed. He will have blood counts and CMP on weekly basis and I will see him back in a month with a CA 19-9 at that point.     I gave him copies of the report of the CT scan that I personally reviewed in the PACs system at [facility] and I agree with the radiologist interpretation.

## 2017-04-24 DIAGNOSIS — C25.9 PANCREAS CARCINOMA (HCC): ICD-10-CM

## 2017-04-24 RX ORDER — SODIUM CHLORIDE 9 MG/ML
250 INJECTION, SOLUTION INTRAVENOUS ONCE
Status: CANCELLED | OUTPATIENT
Start: 2017-05-09

## 2017-04-24 RX ORDER — FLUOROURACIL 50 MG/ML
500 INJECTION, SOLUTION INTRAVENOUS ONCE
Status: CANCELLED | OUTPATIENT
Start: 2017-05-02

## 2017-04-24 RX ORDER — SODIUM CHLORIDE 9 MG/ML
250 INJECTION, SOLUTION INTRAVENOUS ONCE
Status: CANCELLED | OUTPATIENT
Start: 2017-05-02

## 2017-04-24 RX ORDER — SODIUM CHLORIDE 9 MG/ML
250 INJECTION, SOLUTION INTRAVENOUS ONCE
Status: CANCELLED | OUTPATIENT
Start: 2017-05-30

## 2017-04-24 RX ORDER — FLUOROURACIL 50 MG/ML
500 INJECTION, SOLUTION INTRAVENOUS ONCE
Status: CANCELLED | OUTPATIENT
Start: 2017-05-23

## 2017-04-24 RX ORDER — SODIUM CHLORIDE 9 MG/ML
250 INJECTION, SOLUTION INTRAVENOUS ONCE
Status: CANCELLED | OUTPATIENT
Start: 2017-05-23

## 2017-04-24 RX ORDER — FLUOROURACIL 50 MG/ML
500 INJECTION, SOLUTION INTRAVENOUS ONCE
Status: CANCELLED | OUTPATIENT
Start: 2017-05-16

## 2017-04-24 RX ORDER — FLUOROURACIL 50 MG/ML
500 INJECTION, SOLUTION INTRAVENOUS ONCE
Status: CANCELLED | OUTPATIENT
Start: 2017-05-09

## 2017-04-24 RX ORDER — FLUOROURACIL 50 MG/ML
500 INJECTION, SOLUTION INTRAVENOUS ONCE
Status: CANCELLED | OUTPATIENT
Start: 2017-04-25

## 2017-04-24 RX ORDER — SODIUM CHLORIDE 9 MG/ML
250 INJECTION, SOLUTION INTRAVENOUS ONCE
Status: CANCELLED | OUTPATIENT
Start: 2017-04-25

## 2017-04-24 RX ORDER — FLUOROURACIL 50 MG/ML
500 INJECTION, SOLUTION INTRAVENOUS ONCE
Status: CANCELLED | OUTPATIENT
Start: 2017-05-30

## 2017-04-24 RX ORDER — SODIUM CHLORIDE 9 MG/ML
250 INJECTION, SOLUTION INTRAVENOUS ONCE
Status: CANCELLED | OUTPATIENT
Start: 2017-05-16

## 2017-04-25 ENCOUNTER — INFUSION (OUTPATIENT)
Dept: ONCOLOGY | Facility: HOSPITAL | Age: 67
End: 2017-04-25

## 2017-04-25 VITALS
DIASTOLIC BLOOD PRESSURE: 76 MMHG | SYSTOLIC BLOOD PRESSURE: 116 MMHG | BODY MASS INDEX: 32.01 KG/M2 | WEIGHT: 237.6 LBS | HEART RATE: 86 BPM | TEMPERATURE: 97.6 F | OXYGEN SATURATION: 95 %

## 2017-04-25 DIAGNOSIS — C25.9 PANCREAS CARCINOMA (HCC): Primary | ICD-10-CM

## 2017-04-25 LAB
ALBUMIN SERPL-MCNC: 3.7 G/DL (ref 3.5–5.2)
ALBUMIN/GLOB SERPL: 1.2 G/DL
ALP SERPL-CCNC: 42 U/L (ref 39–117)
ALT SERPL W P-5'-P-CCNC: 29 U/L (ref 1–41)
ANION GAP SERPL CALCULATED.3IONS-SCNC: 9.4 MMOL/L
AST SERPL-CCNC: 38 U/L (ref 1–40)
BASOPHILS # BLD AUTO: 0.04 10*3/MM3 (ref 0–0.2)
BASOPHILS NFR BLD AUTO: 1 % (ref 0–1.5)
BILIRUB SERPL-MCNC: 0.5 MG/DL (ref 0.1–1.2)
BUN BLD-MCNC: 15 MG/DL (ref 8–23)
BUN/CREAT SERPL: 21.4 (ref 7–25)
CALCIUM SPEC-SCNC: 9 MG/DL (ref 8.6–10.5)
CHLORIDE SERPL-SCNC: 102 MMOL/L (ref 98–107)
CO2 SERPL-SCNC: 25.6 MMOL/L (ref 22–29)
CREAT BLD-MCNC: 0.7 MG/DL (ref 0.76–1.27)
DEPRECATED RDW RBC AUTO: 49.9 FL (ref 37–54)
EOSINOPHIL # BLD AUTO: 0.17 10*3/MM3 (ref 0–0.7)
EOSINOPHIL NFR BLD AUTO: 4 % (ref 0.3–6.2)
ERYTHROCYTE [DISTWIDTH] IN BLOOD BY AUTOMATED COUNT: 14.4 % (ref 11.5–14.5)
GFR SERPL CREATININE-BSD FRML MDRD: 113 ML/MIN/1.73
GLOBULIN UR ELPH-MCNC: 3 GM/DL
GLUCOSE BLD-MCNC: 131 MG/DL (ref 65–99)
HCT VFR BLD AUTO: 39.1 % (ref 40.4–52.2)
HGB BLD-MCNC: 12.9 G/DL (ref 13.7–17.6)
IMM GRANULOCYTES # BLD: 0.01 10*3/MM3 (ref 0–0.03)
IMM GRANULOCYTES NFR BLD: 0.2 % (ref 0–0.5)
LYMPHOCYTES # BLD AUTO: 0.45 10*3/MM3 (ref 0.9–4.8)
LYMPHOCYTES NFR BLD AUTO: 10.7 % (ref 19.6–45.3)
MCH RBC QN AUTO: 30.9 PG (ref 27–32.7)
MCHC RBC AUTO-ENTMCNC: 33 G/DL (ref 32.6–36.4)
MCV RBC AUTO: 93.8 FL (ref 79.8–96.2)
MONOCYTES # BLD AUTO: 0.43 10*3/MM3 (ref 0.2–1.2)
MONOCYTES NFR BLD AUTO: 10.2 % (ref 5–12)
NEUTROPHILS # BLD AUTO: 3.1 10*3/MM3 (ref 1.9–8.1)
NEUTROPHILS NFR BLD AUTO: 73.9 % (ref 42.7–76)
NRBC BLD MANUAL-RTO: 0 /100 WBC (ref 0–0)
PLATELET # BLD AUTO: 77 10*3/MM3 (ref 140–500)
PMV BLD AUTO: 12 FL (ref 6–12)
POTASSIUM BLD-SCNC: 3.7 MMOL/L (ref 3.5–5.2)
PROT SERPL-MCNC: 6.7 G/DL (ref 6–8.5)
RBC # BLD AUTO: 4.17 10*6/MM3 (ref 4.6–6)
SODIUM BLD-SCNC: 137 MMOL/L (ref 136–145)
WBC NRBC COR # BLD: 4.2 10*3/MM3 (ref 4.5–10.7)

## 2017-04-25 PROCEDURE — 25010000002 DEXAMETHASONE SODIUM PHOSPHATE 10 MG/ML SOLUTION 1 ML VIAL: Performed by: INTERNAL MEDICINE

## 2017-04-25 PROCEDURE — 25010000002 FLUOROURACIL PER 500 MG: Performed by: INTERNAL MEDICINE

## 2017-04-25 PROCEDURE — 25010000002 LEUCOVORIN CALCIUM PER 50 MG: Performed by: INTERNAL MEDICINE

## 2017-04-25 PROCEDURE — 36415 COLL VENOUS BLD VENIPUNCTURE: CPT

## 2017-04-25 PROCEDURE — 96375 TX/PRO/DX INJ NEW DRUG ADDON: CPT | Performed by: INTERNAL MEDICINE

## 2017-04-25 PROCEDURE — 96366 THER/PROPH/DIAG IV INF ADDON: CPT | Performed by: INTERNAL MEDICINE

## 2017-04-25 PROCEDURE — 96409 CHEMO IV PUSH SNGL DRUG: CPT | Performed by: INTERNAL MEDICINE

## 2017-04-25 PROCEDURE — 85025 COMPLETE CBC W/AUTO DIFF WBC: CPT | Performed by: INTERNAL MEDICINE

## 2017-04-25 PROCEDURE — 80053 COMPREHEN METABOLIC PANEL: CPT | Performed by: INTERNAL MEDICINE

## 2017-04-25 PROCEDURE — 96367 TX/PROPH/DG ADDL SEQ IV INF: CPT | Performed by: INTERNAL MEDICINE

## 2017-04-25 RX ORDER — SODIUM CHLORIDE 9 MG/ML
250 INJECTION, SOLUTION INTRAVENOUS ONCE
Status: COMPLETED | OUTPATIENT
Start: 2017-04-25 | End: 2017-04-25

## 2017-04-25 RX ORDER — FLUOROURACIL 50 MG/ML
500 INJECTION, SOLUTION INTRAVENOUS ONCE
Status: COMPLETED | OUTPATIENT
Start: 2017-04-25 | End: 2017-04-25

## 2017-04-25 RX ADMIN — FLUOROURACIL 1140 MG: 50 INJECTION, SOLUTION INTRAVENOUS at 15:46

## 2017-04-25 RX ADMIN — DEXAMETHASONE SODIUM PHOSPHATE 12 MG: 10 INJECTION, SOLUTION INTRAMUSCULAR; INTRAVENOUS at 14:16

## 2017-04-25 RX ADMIN — SODIUM CHLORIDE 250 ML: 900 INJECTION, SOLUTION INTRAVENOUS at 14:00

## 2017-04-25 RX ADMIN — LEUCOVORIN CALCIUM 1140 MG: 350 INJECTION, POWDER, LYOPHILIZED, FOR SOLUTION INTRAMUSCULAR; INTRAVENOUS at 14:41

## 2017-04-25 NOTE — PROGRESS NOTES
PT'S PORT DOES NOT GIVE BLD RTN. EVEN WHEN ACTIVASE HAS BEEN USED IN THE PAST DID NOT GET BLD RTN. PT DOES NOT WANT TO BE TREATED PERIPHERALLY. S/W CP RN AND DR. WADE. OK TO TREAT TODAY THROUGH PORT W/O BLD RTN AND BEFORE PT RTN'S NEXT WK PT NEEDS TO HAVE A PORT DYE STUDY DONE. ORDER PLACED AND RYLEE SCHEDULED FOR TOMORROW AT Located within [facility] (WE DO NOT DO DYE STUDIES HERE AT ). PT MADE AWARE AND V/U.     PT AND WIFE GIVEN WRITTEN INFO ON LEUCO/5FU. CONSENT SIGNED. SE'S REVIEWED.     PT AND WIFE AWARE TO CALL OR GO TO ER W/ BLDING. PLTS TODAY 77K. DR. WADE STATED OK TO TREAT TODAY.     PT CONTINUES ON COUMADIN 1MG DAILY FOR PROPHYLAXIS. PER DR. WADE WE WOULD NOT HOLD COUMADIN FOR PLT COUNT TODAY AND WE DO NOT NEED TO CHECK AN INR. LAST INR CHECKED 8/'16.     AT COMPLETION OF INFUSION TODAY NOTICED THAT PORT SITE WAS SLIGHTLY SWOLLEN. UNABLE TO REALLY TELL IF THERE WAS AN INFILTRATION OR NOT. PT DENIED PAIN. PORT FLUSHED EASILY BUT STILL HAD NO BLD RTN. SITE WAS SLIGHTLY RED BUT COULD HAVE BEEN FROM DRESSING. ADVISED PT TO APPLY COLD COMPRESSES AND MONITOR SITE. PT WILL CALL W/ ANY FURTHER CHANGES.

## 2017-04-26 ENCOUNTER — HOSPITAL ENCOUNTER (OUTPATIENT)
Dept: GENERAL RADIOLOGY | Facility: HOSPITAL | Age: 67
Discharge: HOME | End: 2017-04-26
Admitting: NURSE PRACTITIONER

## 2017-04-26 ENCOUNTER — TELEPHONE (OUTPATIENT)
Dept: ONCOLOGY | Facility: CLINIC | Age: 67
End: 2017-04-26

## 2017-04-26 DIAGNOSIS — C25.9 PANCREAS CARCINOMA (HCC): ICD-10-CM

## 2017-04-26 DIAGNOSIS — Z45.2 ENCOUNTER FOR ADJUSTMENT OR MANAGEMENT OF VASCULAR ACCESS DEVICE: Primary | ICD-10-CM

## 2017-04-26 PROCEDURE — 0 IOPAMIDOL PER 1 ML: Performed by: RADIOLOGY

## 2017-04-26 PROCEDURE — 36598 INJ W/FLUOR EVAL CV DEVICE: CPT

## 2017-04-26 RX ADMIN — IOPAMIDOL 15 ML: 755 INJECTION, SOLUTION INTRAVENOUS at 13:40

## 2017-04-26 NOTE — NURSING NOTE
Cynthia called back from [facility].  She is speaking with the wife now.  I explained that there is a small infiltrate noted on xray and fluid came out when the needle was pulled.  Okay for patient to go.  Dr. Manley will contact a surgeon

## 2017-04-26 NOTE — TELEPHONE ENCOUNTER
Spoke with Ebony at [facility]- patient has an infiltrate noted in port. There is a small leak in the middle. Reviewed with Dr Manley. Patient needs to have port replaced. Will place a referral to surgeon and get appt set up.

## 2017-04-26 NOTE — NURSING NOTE
Dye study complete.  Dr. Camara did procedure.  There is a small infiltrate noted and required pressure to infuse the contrast.  Call to [facility] to determine what patient should do now.

## 2017-04-27 ENCOUNTER — OFFICE VISIT (OUTPATIENT)
Dept: SURGERY | Facility: CLINIC | Age: 67
End: 2017-04-27

## 2017-04-27 ENCOUNTER — TELEPHONE (OUTPATIENT)
Dept: ONCOLOGY | Facility: CLINIC | Age: 67
End: 2017-04-27

## 2017-04-27 VITALS — WEIGHT: 234 LBS | OXYGEN SATURATION: 98 % | BODY MASS INDEX: 31.01 KG/M2 | HEIGHT: 73 IN | HEART RATE: 69 BPM

## 2017-04-27 DIAGNOSIS — C25.9 MALIGNANT NEOPLASM OF PANCREAS, UNSPECIFIED LOCATION OF MALIGNANCY (HCC): Primary | ICD-10-CM

## 2017-04-27 PROCEDURE — 99214 OFFICE O/P EST MOD 30 MIN: CPT | Performed by: SURGERY

## 2017-04-27 RX ORDER — SODIUM CHLORIDE 0.9 % (FLUSH) 0.9 %
1-10 SYRINGE (ML) INJECTION AS NEEDED
Status: CANCELLED | OUTPATIENT
Start: 2017-04-27

## 2017-04-27 NOTE — TELEPHONE ENCOUNTER
----- Message from Cynthia Kumar RN sent at 4/26/2017  3:14 PM EDT -----  Patient needs to have appt set up with general surgery to have his port removed and replaced with a new one. I can not find who this patient has used before? I have a message left for the patient to see if they will call us back. But we need to set something up soon because patient has treatment next week. I placed an order for a referral for general surgery.

## 2017-04-28 ENCOUNTER — APPOINTMENT (OUTPATIENT)
Dept: PREADMISSION TESTING | Facility: HOSPITAL | Age: 67
End: 2017-04-28

## 2017-04-28 VITALS
OXYGEN SATURATION: 96 % | BODY MASS INDEX: 31.13 KG/M2 | WEIGHT: 234.9 LBS | TEMPERATURE: 97.4 F | DIASTOLIC BLOOD PRESSURE: 70 MMHG | HEART RATE: 74 BPM | SYSTOLIC BLOOD PRESSURE: 116 MMHG | RESPIRATION RATE: 16 BRPM | HEIGHT: 73 IN

## 2017-04-28 PROCEDURE — 93010 ELECTROCARDIOGRAM REPORT: CPT | Performed by: INTERNAL MEDICINE

## 2017-04-28 PROCEDURE — 93005 ELECTROCARDIOGRAM TRACING: CPT

## 2017-04-28 RX ORDER — DOXEPIN HYDROCHLORIDE 25 MG/1
25 CAPSULE ORAL NIGHTLY
COMMUNITY
End: 2017-01-01 | Stop reason: HOSPADM
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
## 2017-05-01 ENCOUNTER — HOSPITAL ENCOUNTER (OUTPATIENT)
Facility: HOSPITAL | Age: 67
Setting detail: HOSPITAL OUTPATIENT SURGERY
Discharge: HOME | End: 2017-05-01
Attending: SURGERY | Admitting: SURGERY

## 2017-05-01 ENCOUNTER — ANESTHESIA EVENT (OUTPATIENT)
Dept: PERIOP | Facility: HOSPITAL | Age: 67
End: 2017-05-01

## 2017-05-01 ENCOUNTER — APPOINTMENT (OUTPATIENT)
Dept: GENERAL RADIOLOGY | Facility: HOSPITAL | Age: 67
End: 2017-05-01

## 2017-05-01 ENCOUNTER — ANESTHESIA (OUTPATIENT)
Dept: PERIOP | Facility: HOSPITAL | Age: 67
End: 2017-05-01

## 2017-05-01 VITALS
OXYGEN SATURATION: 99 % | TEMPERATURE: 97.9 F | SYSTOLIC BLOOD PRESSURE: 132 MMHG | HEART RATE: 73 BPM | DIASTOLIC BLOOD PRESSURE: 74 MMHG | RESPIRATION RATE: 18 BRPM

## 2017-05-01 DIAGNOSIS — C25.9 MALIGNANT NEOPLASM OF PANCREAS, UNSPECIFIED LOCATION OF MALIGNANCY (HCC): ICD-10-CM

## 2017-05-01 LAB
ABO GROUP BLD: NORMAL
BLD GP AB SCN SERPL QL: NEGATIVE
INR PPP: 1.22 (ref 0.9–1.1)
PROTHROMBIN TIME: 14.9 SECONDS (ref 11.7–14.2)
RH BLD: POSITIVE

## 2017-05-01 PROCEDURE — 36590 REMOVAL TUNNELED CV CATH: CPT | Performed by: SURGERY

## 2017-05-01 PROCEDURE — 86850 RBC ANTIBODY SCREEN: CPT | Performed by: SURGERY

## 2017-05-01 PROCEDURE — P9035 PLATELET PHERES LEUKOREDUCED: HCPCS

## 2017-05-01 PROCEDURE — 86900 BLOOD TYPING SEROLOGIC ABO: CPT | Performed by: SURGERY

## 2017-05-01 PROCEDURE — 36561 INSERT TUNNELED CV CATH: CPT | Performed by: SURGERY

## 2017-05-01 PROCEDURE — 25010000002 HEPARIN (PORCINE) PER 1000 UNITS: Performed by: SURGERY

## 2017-05-01 PROCEDURE — 85610 PROTHROMBIN TIME: CPT | Performed by: SURGERY

## 2017-05-01 PROCEDURE — 86901 BLOOD TYPING SEROLOGIC RH(D): CPT | Performed by: SURGERY

## 2017-05-01 PROCEDURE — 25010000002 MIDAZOLAM PER 1 MG: Performed by: ANESTHESIOLOGY

## 2017-05-01 PROCEDURE — C1788 PORT, INDWELLING, IMP: HCPCS | Performed by: SURGERY

## 2017-05-01 PROCEDURE — 25010000002 FENTANYL CITRATE (PF) 100 MCG/2ML SOLUTION: Performed by: NURSE ANESTHETIST, CERTIFIED REGISTERED

## 2017-05-01 PROCEDURE — 36430 TRANSFUSION BLD/BLD COMPNT: CPT

## 2017-05-01 PROCEDURE — 25010000003 CEFAZOLIN IN DEXTROSE 2-4 GM/100ML-% SOLUTION: Performed by: SURGERY

## 2017-05-01 PROCEDURE — 77001 FLUOROGUIDE FOR VEIN DEVICE: CPT | Performed by: SURGERY

## 2017-05-01 PROCEDURE — 77001 FLUOROGUIDE FOR VEIN DEVICE: CPT

## 2017-05-01 PROCEDURE — 25010000002 ONDANSETRON PER 1 MG: Performed by: NURSE ANESTHETIST, CERTIFIED REGISTERED

## 2017-05-01 PROCEDURE — 25010000002 PROPOFOL 10 MG/ML EMULSION: Performed by: NURSE ANESTHETIST, CERTIFIED REGISTERED

## 2017-05-01 DEVICE — POWERPORT M.R.I. IMPLANTABLE PORT WITH ATTACHABLE 8F CHRONOFLEX OPEN-ENDED SINGLE-LUMEN VENOUS CATHETER INTERMEDIATE KIT (WITH SUTURE PLUGS)
Type: IMPLANTABLE DEVICE | Site: SUBCLAVIAN | Status: FUNCTIONAL
Brand: POWERPORT M.R.I., CHRONOFLEX

## 2017-05-01 RX ORDER — PROMETHAZINE HYDROCHLORIDE 25 MG/ML
12.5 INJECTION, SOLUTION INTRAMUSCULAR; INTRAVENOUS ONCE AS NEEDED
Status: DISCONTINUED | OUTPATIENT
Start: 2017-05-01 | End: 2017-05-01 | Stop reason: HOSPADM

## 2017-05-01 RX ORDER — BUPIVACAINE HYDROCHLORIDE AND EPINEPHRINE 5; 5 MG/ML; UG/ML
INJECTION, SOLUTION PERINEURAL AS NEEDED
Status: DISCONTINUED | OUTPATIENT
Start: 2017-05-01 | End: 2017-05-01 | Stop reason: HOSPADM

## 2017-05-01 RX ORDER — MAGNESIUM HYDROXIDE 1200 MG/15ML
LIQUID ORAL AS NEEDED
Status: DISCONTINUED | OUTPATIENT
Start: 2017-05-01 | End: 2017-05-01 | Stop reason: HOSPADM

## 2017-05-01 RX ORDER — HYDROMORPHONE HYDROCHLORIDE 1 MG/ML
0.5 INJECTION, SOLUTION INTRAMUSCULAR; INTRAVENOUS; SUBCUTANEOUS
Status: DISCONTINUED | OUTPATIENT
Start: 2017-05-01 | End: 2017-05-01 | Stop reason: HOSPADM

## 2017-05-01 RX ORDER — DIPHENHYDRAMINE HYDROCHLORIDE 50 MG/ML
12.5 INJECTION INTRAMUSCULAR; INTRAVENOUS
Status: DISCONTINUED | OUTPATIENT
Start: 2017-05-01 | End: 2017-05-01 | Stop reason: HOSPADM

## 2017-05-01 RX ORDER — ONDANSETRON 2 MG/ML
INJECTION INTRAMUSCULAR; INTRAVENOUS AS NEEDED
Status: DISCONTINUED | OUTPATIENT
Start: 2017-05-01 | End: 2017-05-01 | Stop reason: SURG

## 2017-05-01 RX ORDER — HYDRALAZINE HYDROCHLORIDE 20 MG/ML
5 INJECTION INTRAMUSCULAR; INTRAVENOUS
Status: DISCONTINUED | OUTPATIENT
Start: 2017-05-01 | End: 2017-05-01 | Stop reason: HOSPADM

## 2017-05-01 RX ORDER — PROMETHAZINE HYDROCHLORIDE 25 MG/1
25 SUPPOSITORY RECTAL ONCE AS NEEDED
Status: DISCONTINUED | OUTPATIENT
Start: 2017-05-01 | End: 2017-05-01 | Stop reason: HOSPADM

## 2017-05-01 RX ORDER — NALOXONE HCL 0.4 MG/ML
0.2 VIAL (ML) INJECTION AS NEEDED
Status: DISCONTINUED | OUTPATIENT
Start: 2017-05-01 | End: 2017-05-01 | Stop reason: HOSPADM

## 2017-05-01 RX ORDER — HYDROCODONE BITARTRATE AND ACETAMINOPHEN 7.5; 325 MG/1; MG/1
1 TABLET ORAL ONCE AS NEEDED
Status: COMPLETED | OUTPATIENT
Start: 2017-05-01 | End: 2017-05-01

## 2017-05-01 RX ORDER — LIDOCAINE HYDROCHLORIDE 20 MG/ML
INJECTION, SOLUTION INFILTRATION; PERINEURAL AS NEEDED
Status: DISCONTINUED | OUTPATIENT
Start: 2017-05-01 | End: 2017-05-01 | Stop reason: SURG

## 2017-05-01 RX ORDER — CEFAZOLIN SODIUM 2 G/100ML
2 INJECTION, SOLUTION INTRAVENOUS ONCE
Status: COMPLETED | OUTPATIENT
Start: 2017-05-01 | End: 2017-05-01

## 2017-05-01 RX ORDER — SODIUM CHLORIDE 0.9 % (FLUSH) 0.9 %
1-10 SYRINGE (ML) INJECTION AS NEEDED
Status: DISCONTINUED | OUTPATIENT
Start: 2017-05-01 | End: 2017-05-01 | Stop reason: HOSPADM

## 2017-05-01 RX ORDER — OXYCODONE AND ACETAMINOPHEN 7.5; 325 MG/1; MG/1
1 TABLET ORAL ONCE AS NEEDED
Status: DISCONTINUED | OUTPATIENT
Start: 2017-05-01 | End: 2017-05-01 | Stop reason: HOSPADM

## 2017-05-01 RX ORDER — SODIUM CHLORIDE, SODIUM LACTATE, POTASSIUM CHLORIDE, CALCIUM CHLORIDE 600; 310; 30; 20 MG/100ML; MG/100ML; MG/100ML; MG/100ML
9 INJECTION, SOLUTION INTRAVENOUS CONTINUOUS PRN
Status: DISCONTINUED | OUTPATIENT
Start: 2017-05-01 | End: 2017-05-01 | Stop reason: HOSPADM

## 2017-05-01 RX ORDER — ONDANSETRON 2 MG/ML
4 INJECTION INTRAMUSCULAR; INTRAVENOUS ONCE AS NEEDED
Status: DISCONTINUED | OUTPATIENT
Start: 2017-05-01 | End: 2017-05-01 | Stop reason: HOSPADM

## 2017-05-01 RX ORDER — FAMOTIDINE 10 MG/ML
20 INJECTION, SOLUTION INTRAVENOUS
Status: DISCONTINUED | OUTPATIENT
Start: 2017-05-01 | End: 2017-05-01 | Stop reason: HOSPADM

## 2017-05-01 RX ORDER — FLUMAZENIL 0.1 MG/ML
0.2 INJECTION INTRAVENOUS AS NEEDED
Status: DISCONTINUED | OUTPATIENT
Start: 2017-05-01 | End: 2017-05-01 | Stop reason: HOSPADM

## 2017-05-01 RX ORDER — LABETALOL HYDROCHLORIDE 5 MG/ML
5 INJECTION, SOLUTION INTRAVENOUS
Status: DISCONTINUED | OUTPATIENT
Start: 2017-05-01 | End: 2017-05-01 | Stop reason: HOSPADM

## 2017-05-01 RX ORDER — FENTANYL CITRATE 50 UG/ML
50 INJECTION, SOLUTION INTRAMUSCULAR; INTRAVENOUS
Status: DISCONTINUED | OUTPATIENT
Start: 2017-05-01 | End: 2017-05-01 | Stop reason: HOSPADM

## 2017-05-01 RX ORDER — PROMETHAZINE HYDROCHLORIDE 25 MG/1
12.5 TABLET ORAL ONCE AS NEEDED
Status: DISCONTINUED | OUTPATIENT
Start: 2017-05-01 | End: 2017-05-01 | Stop reason: HOSPADM

## 2017-05-01 RX ORDER — SODIUM CHLORIDE 9 MG/ML
3 INJECTION, SOLUTION INTRAVENOUS CONTINUOUS
Status: DISCONTINUED | OUTPATIENT
Start: 2017-05-01 | End: 2017-05-01 | Stop reason: HOSPADM

## 2017-05-01 RX ORDER — MIDAZOLAM HYDROCHLORIDE 1 MG/ML
2 INJECTION INTRAMUSCULAR; INTRAVENOUS
Status: DISCONTINUED | OUTPATIENT
Start: 2017-05-01 | End: 2017-05-01 | Stop reason: HOSPADM

## 2017-05-01 RX ORDER — MIDAZOLAM HYDROCHLORIDE 1 MG/ML
1 INJECTION INTRAMUSCULAR; INTRAVENOUS
Status: DISCONTINUED | OUTPATIENT
Start: 2017-05-01 | End: 2017-05-01 | Stop reason: HOSPADM

## 2017-05-01 RX ORDER — FENTANYL CITRATE 50 UG/ML
INJECTION, SOLUTION INTRAMUSCULAR; INTRAVENOUS AS NEEDED
Status: DISCONTINUED | OUTPATIENT
Start: 2017-05-01 | End: 2017-05-01 | Stop reason: SURG

## 2017-05-01 RX ORDER — PROMETHAZINE HYDROCHLORIDE 25 MG/1
25 TABLET ORAL ONCE AS NEEDED
Status: DISCONTINUED | OUTPATIENT
Start: 2017-05-01 | End: 2017-05-01 | Stop reason: HOSPADM

## 2017-05-01 RX ORDER — PROPOFOL 10 MG/ML
VIAL (ML) INTRAVENOUS CONTINUOUS PRN
Status: DISCONTINUED | OUTPATIENT
Start: 2017-05-01 | End: 2017-05-01 | Stop reason: SURG

## 2017-05-01 RX ADMIN — ONDANSETRON 4 MG: 2 INJECTION INTRAMUSCULAR; INTRAVENOUS at 10:52

## 2017-05-01 RX ADMIN — FENTANYL CITRATE 50 MCG: 50 INJECTION INTRAMUSCULAR; INTRAVENOUS at 11:00

## 2017-05-01 RX ADMIN — FENTANYL CITRATE 50 MCG: 50 INJECTION INTRAMUSCULAR; INTRAVENOUS at 09:51

## 2017-05-01 RX ADMIN — FAMOTIDINE 20 MG: 10 INJECTION, SOLUTION INTRAVENOUS at 09:13

## 2017-05-01 RX ADMIN — SODIUM CHLORIDE: 9 INJECTION, SOLUTION INTRAVENOUS at 09:30

## 2017-05-01 RX ADMIN — FENTANYL CITRATE 100 MCG: 50 INJECTION INTRAMUSCULAR; INTRAVENOUS at 09:32

## 2017-05-01 RX ADMIN — MIDAZOLAM 1 MG: 1 INJECTION INTRAMUSCULAR; INTRAVENOUS at 09:13

## 2017-05-01 RX ADMIN — LIDOCAINE HYDROCHLORIDE 60 MG: 20 INJECTION, SOLUTION INFILTRATION; PERINEURAL at 09:31

## 2017-05-01 RX ADMIN — PROPOFOL 100 MCG/KG/MIN: 10 INJECTION, EMULSION INTRAVENOUS at 09:33

## 2017-05-01 RX ADMIN — FENTANYL CITRATE 50 MCG: 50 INJECTION INTRAMUSCULAR; INTRAVENOUS at 11:37

## 2017-05-01 RX ADMIN — CEFAZOLIN SODIUM 2 G: 2 INJECTION, SOLUTION INTRAVENOUS at 09:31

## 2017-05-01 RX ADMIN — FENTANYL CITRATE 50 MCG: 50 INJECTION INTRAMUSCULAR; INTRAVENOUS at 09:35

## 2017-05-01 RX ADMIN — HYDROCODONE BITARTRATE AND ACETAMINOPHEN 1 TABLET: 7.5; 325 TABLET ORAL at 11:35

## 2017-05-02 ENCOUNTER — INFUSION (OUTPATIENT)
Dept: ONCOLOGY | Facility: HOSPITAL | Age: 67
End: 2017-05-02

## 2017-05-02 VITALS
DIASTOLIC BLOOD PRESSURE: 78 MMHG | HEART RATE: 74 BPM | BODY MASS INDEX: 31.53 KG/M2 | OXYGEN SATURATION: 98 % | TEMPERATURE: 98.4 F | SYSTOLIC BLOOD PRESSURE: 125 MMHG | WEIGHT: 239 LBS

## 2017-05-02 DIAGNOSIS — C25.9 PANCREAS CARCINOMA (HCC): Primary | ICD-10-CM

## 2017-05-02 LAB
ABO + RH BLD: NORMAL
ALBUMIN SERPL-MCNC: 3.7 G/DL (ref 3.5–5.2)
ALBUMIN/GLOB SERPL: 1.3 G/DL
ALP SERPL-CCNC: 45 U/L (ref 39–117)
ALT SERPL W P-5'-P-CCNC: 26 U/L (ref 1–41)
ANION GAP SERPL CALCULATED.3IONS-SCNC: 10.3 MMOL/L
AST SERPL-CCNC: 32 U/L (ref 1–40)
BASOPHILS # BLD AUTO: 0.02 10*3/MM3 (ref 0–0.2)
BASOPHILS NFR BLD AUTO: 0.4 % (ref 0–1.5)
BH BB BLOOD EXPIRATION DATE: NORMAL
BH BB BLOOD TYPE BARCODE: 7300
BH BB DISPENSE STATUS: NORMAL
BH BB PRODUCT CODE: NORMAL
BH BB UNIT NUMBER: NORMAL
BILIRUB SERPL-MCNC: 0.4 MG/DL (ref 0.1–1.2)
BUN BLD-MCNC: 13 MG/DL (ref 8–23)
BUN/CREAT SERPL: 17.8 (ref 7–25)
CALCIUM SPEC-SCNC: 8.9 MG/DL (ref 8.6–10.5)
CHLORIDE SERPL-SCNC: 105 MMOL/L (ref 98–107)
CO2 SERPL-SCNC: 27.7 MMOL/L (ref 22–29)
CREAT BLD-MCNC: 0.73 MG/DL (ref 0.76–1.27)
DEPRECATED RDW RBC AUTO: 49.9 FL (ref 37–54)
EOSINOPHIL # BLD AUTO: 0.13 10*3/MM3 (ref 0–0.7)
EOSINOPHIL NFR BLD AUTO: 2.9 % (ref 0.3–6.2)
ERYTHROCYTE [DISTWIDTH] IN BLOOD BY AUTOMATED COUNT: 14.5 % (ref 11.5–14.5)
GFR SERPL CREATININE-BSD FRML MDRD: 107 ML/MIN/1.73
GLOBULIN UR ELPH-MCNC: 2.8 GM/DL
GLUCOSE BLD-MCNC: 133 MG/DL (ref 65–99)
HCT VFR BLD AUTO: 36.3 % (ref 40.4–52.2)
HGB BLD-MCNC: 12.1 G/DL (ref 13.7–17.6)
IMM GRANULOCYTES # BLD: 0.01 10*3/MM3 (ref 0–0.03)
IMM GRANULOCYTES NFR BLD: 0.2 % (ref 0–0.5)
LYMPHOCYTES # BLD AUTO: 0.42 10*3/MM3 (ref 0.9–4.8)
LYMPHOCYTES NFR BLD AUTO: 9.4 % (ref 19.6–45.3)
MCH RBC QN AUTO: 31.3 PG (ref 27–32.7)
MCHC RBC AUTO-ENTMCNC: 33.3 G/DL (ref 32.6–36.4)
MCV RBC AUTO: 94 FL (ref 79.8–96.2)
MONOCYTES # BLD AUTO: 0.43 10*3/MM3 (ref 0.2–1.2)
MONOCYTES NFR BLD AUTO: 9.7 % (ref 5–12)
NEUTROPHILS # BLD AUTO: 3.44 10*3/MM3 (ref 1.9–8.1)
NEUTROPHILS NFR BLD AUTO: 77.4 % (ref 42.7–76)
NRBC BLD MANUAL-RTO: 0 /100 WBC (ref 0–0)
PLATELET # BLD AUTO: 79 10*3/MM3 (ref 140–500)
PMV BLD AUTO: 12.4 FL (ref 6–12)
POTASSIUM BLD-SCNC: 3.9 MMOL/L (ref 3.5–5.2)
PROT SERPL-MCNC: 6.5 G/DL (ref 6–8.5)
RBC # BLD AUTO: 3.86 10*6/MM3 (ref 4.6–6)
SODIUM BLD-SCNC: 143 MMOL/L (ref 136–145)
UNIT  ABO: NORMAL
UNIT  RH: NORMAL
WBC NRBC COR # BLD: 4.45 10*3/MM3 (ref 4.5–10.7)

## 2017-05-02 PROCEDURE — 25010000002 DEXAMETHASONE SODIUM PHOSPHATE 10 MG/ML SOLUTION 1 ML VIAL: Performed by: INTERNAL MEDICINE

## 2017-05-02 PROCEDURE — 96367 TX/PROPH/DG ADDL SEQ IV INF: CPT | Performed by: INTERNAL MEDICINE

## 2017-05-02 PROCEDURE — 25010000002 FLUOROURACIL PER 500 MG: Performed by: INTERNAL MEDICINE

## 2017-05-02 PROCEDURE — 80053 COMPREHEN METABOLIC PANEL: CPT | Performed by: INTERNAL MEDICINE

## 2017-05-02 PROCEDURE — 25010000002 LEUCOVORIN CALCIUM PER 50 MG: Performed by: INTERNAL MEDICINE

## 2017-05-02 PROCEDURE — 96366 THER/PROPH/DIAG IV INF ADDON: CPT | Performed by: INTERNAL MEDICINE

## 2017-05-02 PROCEDURE — 85025 COMPLETE CBC W/AUTO DIFF WBC: CPT | Performed by: INTERNAL MEDICINE

## 2017-05-02 PROCEDURE — 96409 CHEMO IV PUSH SNGL DRUG: CPT | Performed by: INTERNAL MEDICINE

## 2017-05-02 PROCEDURE — 36415 COLL VENOUS BLD VENIPUNCTURE: CPT | Performed by: INTERNAL MEDICINE

## 2017-05-02 PROCEDURE — 96375 TX/PRO/DX INJ NEW DRUG ADDON: CPT | Performed by: INTERNAL MEDICINE

## 2017-05-02 RX ORDER — SODIUM CHLORIDE 9 MG/ML
250 INJECTION, SOLUTION INTRAVENOUS ONCE
Status: COMPLETED | OUTPATIENT
Start: 2017-05-02 | End: 2017-05-02

## 2017-05-02 RX ORDER — FLUOROURACIL 50 MG/ML
500 INJECTION, SOLUTION INTRAVENOUS ONCE
Status: COMPLETED | OUTPATIENT
Start: 2017-05-02 | End: 2017-05-02

## 2017-05-02 RX ADMIN — LEUCOVORIN CALCIUM 1140 MG: 350 INJECTION, POWDER, LYOPHILIZED, FOR SOLUTION INTRAMUSCULAR; INTRAVENOUS at 14:23

## 2017-05-02 RX ADMIN — SODIUM CHLORIDE 250 ML: 900 INJECTION, SOLUTION INTRAVENOUS at 13:47

## 2017-05-02 RX ADMIN — DEXAMETHASONE SODIUM PHOSPHATE 12 MG: 10 INJECTION, SOLUTION INTRAMUSCULAR; INTRAVENOUS at 13:59

## 2017-05-02 RX ADMIN — FLUOROURACIL 1140 MG: 50 INJECTION, SOLUTION INTRAVENOUS at 15:27

## 2017-05-05 RX ORDER — MELOXICAM 15 MG/1
TABLET ORAL
Qty: 30 TABLET | Refills: 3 | OUTPATIENT
Start: 2017-05-05

## 2017-05-05 RX ORDER — LANOLIN ALCOHOL/MO/W.PET/CERES
CREAM (GRAM) TOPICAL
Qty: 30 TABLET | Refills: 2 | OUTPATIENT
Start: 2017-05-05

## 2017-05-09 ENCOUNTER — INFUSION (OUTPATIENT)
Dept: ONCOLOGY | Facility: HOSPITAL | Age: 67
End: 2017-05-09

## 2017-05-09 ENCOUNTER — TELEPHONE (OUTPATIENT)
Dept: ONCOLOGY | Facility: CLINIC | Age: 67
End: 2017-05-09

## 2017-05-09 VITALS — BODY MASS INDEX: 31.61 KG/M2 | WEIGHT: 239.6 LBS

## 2017-05-09 VITALS
SYSTOLIC BLOOD PRESSURE: 119 MMHG | TEMPERATURE: 97.8 F | WEIGHT: 239.6 LBS | DIASTOLIC BLOOD PRESSURE: 76 MMHG | OXYGEN SATURATION: 98 % | HEART RATE: 75 BPM | BODY MASS INDEX: 31.61 KG/M2

## 2017-05-09 DIAGNOSIS — C25.9 PANCREAS CARCINOMA (HCC): Primary | ICD-10-CM

## 2017-05-09 LAB
ALBUMIN SERPL-MCNC: 3.6 G/DL (ref 3.5–5.2)
ALBUMIN/GLOB SERPL: 1.3 G/DL
ALP SERPL-CCNC: 43 U/L (ref 39–117)
ALT SERPL W P-5'-P-CCNC: 41 U/L (ref 1–41)
ANION GAP SERPL CALCULATED.3IONS-SCNC: 9.7 MMOL/L
AST SERPL-CCNC: 41 U/L (ref 1–40)
BASOPHILS # BLD AUTO: 0.03 10*3/MM3 (ref 0–0.2)
BASOPHILS NFR BLD AUTO: 0.5 % (ref 0–1.5)
BILIRUB SERPL-MCNC: 0.6 MG/DL (ref 0.1–1.2)
BUN BLD-MCNC: 16 MG/DL (ref 8–23)
BUN/CREAT SERPL: 21.3 (ref 7–25)
CALCIUM SPEC-SCNC: 9.1 MG/DL (ref 8.6–10.5)
CHLORIDE SERPL-SCNC: 102 MMOL/L (ref 98–107)
CO2 SERPL-SCNC: 27.3 MMOL/L (ref 22–29)
CREAT BLD-MCNC: 0.75 MG/DL (ref 0.76–1.27)
DEPRECATED RDW RBC AUTO: 51.2 FL (ref 37–54)
EOSINOPHIL # BLD AUTO: 0.16 10*3/MM3 (ref 0–0.7)
EOSINOPHIL NFR BLD AUTO: 2.4 % (ref 0.3–6.2)
ERYTHROCYTE [DISTWIDTH] IN BLOOD BY AUTOMATED COUNT: 15.1 % (ref 11.5–14.5)
GFR SERPL CREATININE-BSD FRML MDRD: 104 ML/MIN/1.73
GLOBULIN UR ELPH-MCNC: 2.8 GM/DL
GLUCOSE BLD-MCNC: 106 MG/DL (ref 65–99)
HCT VFR BLD AUTO: 37.4 % (ref 40.4–52.2)
HGB BLD-MCNC: 12.3 G/DL (ref 13.7–17.6)
IMM GRANULOCYTES # BLD: 0.02 10*3/MM3 (ref 0–0.03)
IMM GRANULOCYTES NFR BLD: 0.3 % (ref 0–0.5)
LYMPHOCYTES # BLD AUTO: 0.53 10*3/MM3 (ref 0.9–4.8)
LYMPHOCYTES NFR BLD AUTO: 8 % (ref 19.6–45.3)
MCH RBC QN AUTO: 31.3 PG (ref 27–32.7)
MCHC RBC AUTO-ENTMCNC: 32.9 G/DL (ref 32.6–36.4)
MCV RBC AUTO: 95.2 FL (ref 79.8–96.2)
MONOCYTES # BLD AUTO: 0.45 10*3/MM3 (ref 0.2–1.2)
MONOCYTES NFR BLD AUTO: 6.8 % (ref 5–12)
NEUTROPHILS # BLD AUTO: 5.47 10*3/MM3 (ref 1.9–8.1)
NEUTROPHILS NFR BLD AUTO: 82 % (ref 42.7–76)
NRBC BLD MANUAL-RTO: 0 /100 WBC (ref 0–0)
PLATELET # BLD AUTO: 79 10*3/MM3 (ref 140–500)
PMV BLD AUTO: 12.1 FL (ref 6–12)
POTASSIUM BLD-SCNC: 4.2 MMOL/L (ref 3.5–5.2)
PROT SERPL-MCNC: 6.4 G/DL (ref 6–8.5)
RBC # BLD AUTO: 3.93 10*6/MM3 (ref 4.6–6)
SODIUM BLD-SCNC: 139 MMOL/L (ref 136–145)
WBC NRBC COR # BLD: 6.66 10*3/MM3 (ref 4.5–10.7)

## 2017-05-09 PROCEDURE — 96409 CHEMO IV PUSH SNGL DRUG: CPT | Performed by: INTERNAL MEDICINE

## 2017-05-09 PROCEDURE — 25010000002 LEUCOVORIN CALCIUM PER 50 MG: Performed by: INTERNAL MEDICINE

## 2017-05-09 PROCEDURE — 85025 COMPLETE CBC W/AUTO DIFF WBC: CPT | Performed by: INTERNAL MEDICINE

## 2017-05-09 PROCEDURE — 25010000002 FLUOROURACIL PER 500 MG: Performed by: INTERNAL MEDICINE

## 2017-05-09 PROCEDURE — 96375 TX/PRO/DX INJ NEW DRUG ADDON: CPT | Performed by: INTERNAL MEDICINE

## 2017-05-09 PROCEDURE — 25010000002 DEXAMETHASONE PER 1 MG: Performed by: INTERNAL MEDICINE

## 2017-05-09 PROCEDURE — 96366 THER/PROPH/DIAG IV INF ADDON: CPT | Performed by: INTERNAL MEDICINE

## 2017-05-09 PROCEDURE — 36415 COLL VENOUS BLD VENIPUNCTURE: CPT

## 2017-05-09 PROCEDURE — 80053 COMPREHEN METABOLIC PANEL: CPT | Performed by: INTERNAL MEDICINE

## 2017-05-09 PROCEDURE — 96367 TX/PROPH/DG ADDL SEQ IV INF: CPT | Performed by: INTERNAL MEDICINE

## 2017-05-09 RX ORDER — FLUOROURACIL 50 MG/ML
500 INJECTION, SOLUTION INTRAVENOUS ONCE
Status: COMPLETED | OUTPATIENT
Start: 2017-05-09 | End: 2017-05-09

## 2017-05-09 RX ORDER — SODIUM CHLORIDE 9 MG/ML
250 INJECTION, SOLUTION INTRAVENOUS ONCE
Status: COMPLETED | OUTPATIENT
Start: 2017-05-09 | End: 2017-05-09

## 2017-05-09 RX ADMIN — FLUOROURACIL 1140 MG: 50 INJECTION, SOLUTION INTRAVENOUS at 14:47

## 2017-05-09 RX ADMIN — LEUCOVORIN CALCIUM 1140 MG: 350 INJECTION, POWDER, LYOPHILIZED, FOR SOLUTION INTRAMUSCULAR; INTRAVENOUS at 13:44

## 2017-05-09 RX ADMIN — DEXAMETHASONE SODIUM PHOSPHATE 12 MG: 10 INJECTION INTRAMUSCULAR; INTRAVENOUS at 13:26

## 2017-05-09 RX ADMIN — SODIUM CHLORIDE 250 ML: 900 INJECTION, SOLUTION INTRAVENOUS at 13:20

## 2017-05-11 ENCOUNTER — TELEPHONE (OUTPATIENT)
Dept: SURGERY | Facility: CLINIC | Age: 67
End: 2017-05-11

## 2017-05-16 ENCOUNTER — OFFICE VISIT (OUTPATIENT)
Dept: ONCOLOGY | Facility: CLINIC | Age: 67
End: 2017-05-16

## 2017-05-16 ENCOUNTER — INFUSION (OUTPATIENT)
Dept: ONCOLOGY | Facility: HOSPITAL | Age: 67
End: 2017-05-16

## 2017-05-16 VITALS
RESPIRATION RATE: 16 BRPM | BODY MASS INDEX: 31.09 KG/M2 | DIASTOLIC BLOOD PRESSURE: 67 MMHG | TEMPERATURE: 97.6 F | SYSTOLIC BLOOD PRESSURE: 107 MMHG | OXYGEN SATURATION: 96 % | WEIGHT: 234.6 LBS | HEART RATE: 86 BPM | HEIGHT: 73 IN

## 2017-05-16 DIAGNOSIS — G89.3 CANCER ASSOCIATED PAIN: ICD-10-CM

## 2017-05-16 DIAGNOSIS — C79.51 BONE METASTASIS: ICD-10-CM

## 2017-05-16 DIAGNOSIS — D69.6 ACQUIRED THROMBOCYTOPENIA (HCC): ICD-10-CM

## 2017-05-16 DIAGNOSIS — C25.9 PANCREAS CARCINOMA (HCC): Primary | ICD-10-CM

## 2017-05-16 DIAGNOSIS — C79.51 METASTASIS TO SPINAL COLUMN (HCC): ICD-10-CM

## 2017-05-16 DIAGNOSIS — G62.0 NEUROPATHY DUE TO CHEMOTHERAPEUTIC DRUG (HCC): ICD-10-CM

## 2017-05-16 DIAGNOSIS — R16.1 SPLENOMEGALY: ICD-10-CM

## 2017-05-16 DIAGNOSIS — Z45.2 ENCOUNTER FOR ADJUSTMENT OR MANAGEMENT OF VASCULAR ACCESS DEVICE: ICD-10-CM

## 2017-05-16 DIAGNOSIS — C25.9 PANCREAS CARCINOMA (HCC): ICD-10-CM

## 2017-05-16 DIAGNOSIS — T45.1X5A NEUROPATHY DUE TO CHEMOTHERAPEUTIC DRUG (HCC): ICD-10-CM

## 2017-05-16 DIAGNOSIS — C78.7 LIVER METASTASES: ICD-10-CM

## 2017-05-16 LAB
ALBUMIN SERPL-MCNC: 3.7 G/DL (ref 3.5–5.2)
ALBUMIN/GLOB SERPL: 1.3 G/DL
ALP SERPL-CCNC: 43 U/L (ref 39–117)
ALT SERPL W P-5'-P-CCNC: 44 U/L (ref 1–41)
ANION GAP SERPL CALCULATED.3IONS-SCNC: 10.7 MMOL/L
AST SERPL-CCNC: 44 U/L (ref 1–40)
BASOPHILS # BLD AUTO: 0.03 10*3/MM3 (ref 0–0.2)
BASOPHILS NFR BLD AUTO: 0.7 % (ref 0–1.5)
BILIRUB SERPL-MCNC: 0.9 MG/DL (ref 0.1–1.2)
BUN BLD-MCNC: 19 MG/DL (ref 8–23)
BUN/CREAT SERPL: 23.2 (ref 7–25)
CALCIUM SPEC-SCNC: 9 MG/DL (ref 8.6–10.5)
CHLORIDE SERPL-SCNC: 101 MMOL/L (ref 98–107)
CO2 SERPL-SCNC: 26.3 MMOL/L (ref 22–29)
CREAT BLD-MCNC: 0.82 MG/DL (ref 0.76–1.27)
DEPRECATED RDW RBC AUTO: 52.9 FL (ref 37–54)
EOSINOPHIL # BLD AUTO: 0.2 10*3/MM3 (ref 0–0.7)
EOSINOPHIL NFR BLD AUTO: 4.6 % (ref 0.3–6.2)
ERYTHROCYTE [DISTWIDTH] IN BLOOD BY AUTOMATED COUNT: 16.1 % (ref 11.5–14.5)
GFR SERPL CREATININE-BSD FRML MDRD: 94 ML/MIN/1.73
GLOBULIN UR ELPH-MCNC: 2.8 GM/DL
GLUCOSE BLD-MCNC: 138 MG/DL (ref 65–99)
HCT VFR BLD AUTO: 36.3 % (ref 40.4–52.2)
HGB BLD-MCNC: 12.1 G/DL (ref 13.7–17.6)
IMM GRANULOCYTES # BLD: 0 10*3/MM3 (ref 0–0.03)
IMM GRANULOCYTES NFR BLD: 0 % (ref 0–0.5)
LYMPHOCYTES # BLD AUTO: 0.6 10*3/MM3 (ref 0.9–4.8)
LYMPHOCYTES NFR BLD AUTO: 13.7 % (ref 19.6–45.3)
MCH RBC QN AUTO: 31.7 PG (ref 27–32.7)
MCHC RBC AUTO-ENTMCNC: 33.3 G/DL (ref 32.6–36.4)
MCV RBC AUTO: 95 FL (ref 79.8–96.2)
MONOCYTES # BLD AUTO: 0.29 10*3/MM3 (ref 0.2–1.2)
MONOCYTES NFR BLD AUTO: 6.6 % (ref 5–12)
NEUTROPHILS # BLD AUTO: 3.26 10*3/MM3 (ref 1.9–8.1)
NEUTROPHILS NFR BLD AUTO: 74.4 % (ref 42.7–76)
NRBC BLD MANUAL-RTO: 0 /100 WBC (ref 0–0)
PLATELET # BLD AUTO: 80 10*3/MM3 (ref 140–500)
PMV BLD AUTO: 13.2 FL (ref 6–12)
POTASSIUM BLD-SCNC: 3.9 MMOL/L (ref 3.5–5.2)
PROT SERPL-MCNC: 6.5 G/DL (ref 6–8.5)
RBC # BLD AUTO: 3.82 10*6/MM3 (ref 4.6–6)
SODIUM BLD-SCNC: 138 MMOL/L (ref 136–145)
WBC NRBC COR # BLD: 4.38 10*3/MM3 (ref 4.5–10.7)

## 2017-05-16 PROCEDURE — 85025 COMPLETE CBC W/AUTO DIFF WBC: CPT | Performed by: INTERNAL MEDICINE

## 2017-05-16 PROCEDURE — 25010000002 DEXAMETHASONE SODIUM PHOSPHATE 10 MG/ML SOLUTION 1 ML VIAL: Performed by: INTERNAL MEDICINE

## 2017-05-16 PROCEDURE — 36415 COLL VENOUS BLD VENIPUNCTURE: CPT

## 2017-05-16 PROCEDURE — 25010000002 LEUCOVORIN CALCIUM PER 50 MG: Performed by: INTERNAL MEDICINE

## 2017-05-16 PROCEDURE — 25010000002 FLUOROURACIL PER 500 MG: Performed by: INTERNAL MEDICINE

## 2017-05-16 PROCEDURE — 80053 COMPREHEN METABOLIC PANEL: CPT | Performed by: INTERNAL MEDICINE

## 2017-05-16 PROCEDURE — 96367 TX/PROPH/DG ADDL SEQ IV INF: CPT | Performed by: INTERNAL MEDICINE

## 2017-05-16 PROCEDURE — 86301 IMMUNOASSAY TUMOR CA 19-9: CPT | Performed by: INTERNAL MEDICINE

## 2017-05-16 PROCEDURE — 96409 CHEMO IV PUSH SNGL DRUG: CPT | Performed by: INTERNAL MEDICINE

## 2017-05-16 PROCEDURE — 99213 OFFICE O/P EST LOW 20 MIN: CPT | Performed by: INTERNAL MEDICINE

## 2017-05-16 PROCEDURE — 96366 THER/PROPH/DIAG IV INF ADDON: CPT | Performed by: INTERNAL MEDICINE

## 2017-05-16 PROCEDURE — 96375 TX/PRO/DX INJ NEW DRUG ADDON: CPT | Performed by: INTERNAL MEDICINE

## 2017-05-16 RX ORDER — FLUOROURACIL 50 MG/ML
500 INJECTION, SOLUTION INTRAVENOUS ONCE
Status: COMPLETED | OUTPATIENT
Start: 2017-05-16 | End: 2017-05-16

## 2017-05-16 RX ORDER — SODIUM CHLORIDE 9 MG/ML
250 INJECTION, SOLUTION INTRAVENOUS ONCE
Status: COMPLETED | OUTPATIENT
Start: 2017-05-16 | End: 2017-05-16

## 2017-05-16 RX ADMIN — FLUOROURACIL 1140 MG: 50 INJECTION, SOLUTION INTRAVENOUS at 15:19

## 2017-05-16 RX ADMIN — DEXAMETHASONE SODIUM PHOSPHATE 12 MG: 10 INJECTION, SOLUTION INTRAMUSCULAR; INTRAVENOUS at 14:00

## 2017-05-16 RX ADMIN — LEUCOVORIN CALCIUM 1140 MG: 350 INJECTION, POWDER, LYOPHILIZED, FOR SOLUTION INTRAMUSCULAR; INTRAVENOUS at 14:17

## 2017-05-16 RX ADMIN — SODIUM CHLORIDE 250 ML: 900 INJECTION, SOLUTION INTRAVENOUS at 13:39

## 2017-05-18 LAB — CANCER AG19-9 SERPL-ACNC: 84 U/ML (ref 0–35)

## 2017-05-23 ENCOUNTER — APPOINTMENT (OUTPATIENT)
Dept: ONCOLOGY | Facility: HOSPITAL | Age: 67
End: 2017-05-23

## 2017-05-23 ENCOUNTER — INFUSION (OUTPATIENT)
Dept: ONCOLOGY | Facility: HOSPITAL | Age: 67
End: 2017-05-23

## 2017-05-23 VITALS
HEART RATE: 68 BPM | DIASTOLIC BLOOD PRESSURE: 61 MMHG | TEMPERATURE: 97.7 F | SYSTOLIC BLOOD PRESSURE: 107 MMHG | WEIGHT: 235.8 LBS | BODY MASS INDEX: 31.11 KG/M2

## 2017-05-23 DIAGNOSIS — C25.9 PANCREAS CARCINOMA (HCC): Primary | ICD-10-CM

## 2017-05-23 LAB
ALBUMIN SERPL-MCNC: 3.5 G/DL (ref 3.5–5.2)
ALBUMIN/GLOB SERPL: 1.3 G/DL
ALP SERPL-CCNC: 43 U/L (ref 39–117)
ALT SERPL W P-5'-P-CCNC: 41 U/L (ref 1–41)
ANION GAP SERPL CALCULATED.3IONS-SCNC: 11.3 MMOL/L
AST SERPL-CCNC: 47 U/L (ref 1–40)
BASOPHILS # BLD AUTO: 0.02 10*3/MM3 (ref 0–0.2)
BASOPHILS NFR BLD AUTO: 0.7 % (ref 0–1.5)
BILIRUB SERPL-MCNC: 0.6 MG/DL (ref 0.1–1.2)
BUN BLD-MCNC: 13 MG/DL (ref 8–23)
BUN/CREAT SERPL: 14.1 (ref 7–25)
CALCIUM SPEC-SCNC: 9 MG/DL (ref 8.6–10.5)
CHLORIDE SERPL-SCNC: 103 MMOL/L (ref 98–107)
CO2 SERPL-SCNC: 26.7 MMOL/L (ref 22–29)
CREAT BLD-MCNC: 0.92 MG/DL (ref 0.76–1.27)
DEPRECATED RDW RBC AUTO: 55.1 FL (ref 37–54)
EOSINOPHIL # BLD AUTO: 0.23 10*3/MM3 (ref 0–0.7)
EOSINOPHIL NFR BLD AUTO: 8.5 % (ref 0.3–6.2)
ERYTHROCYTE [DISTWIDTH] IN BLOOD BY AUTOMATED COUNT: 17.2 % (ref 11.5–14.5)
GFR SERPL CREATININE-BSD FRML MDRD: 82 ML/MIN/1.73
GLOBULIN UR ELPH-MCNC: 2.7 GM/DL
GLUCOSE BLD-MCNC: 124 MG/DL (ref 65–99)
HCT VFR BLD AUTO: 33.4 % (ref 40.4–52.2)
HGB BLD-MCNC: 11.1 G/DL (ref 13.7–17.6)
IMM GRANULOCYTES # BLD: 0 10*3/MM3 (ref 0–0.03)
IMM GRANULOCYTES NFR BLD: 0 % (ref 0–0.5)
LYMPHOCYTES # BLD AUTO: 0.39 10*3/MM3 (ref 0.9–4.8)
LYMPHOCYTES NFR BLD AUTO: 14.4 % (ref 19.6–45.3)
MCH RBC QN AUTO: 32 PG (ref 27–32.7)
MCHC RBC AUTO-ENTMCNC: 33.2 G/DL (ref 32.6–36.4)
MCV RBC AUTO: 96.3 FL (ref 79.8–96.2)
MONOCYTES # BLD AUTO: 0.24 10*3/MM3 (ref 0.2–1.2)
MONOCYTES NFR BLD AUTO: 8.9 % (ref 5–12)
NEUTROPHILS # BLD AUTO: 1.83 10*3/MM3 (ref 1.9–8.1)
NEUTROPHILS NFR BLD AUTO: 67.5 % (ref 42.7–76)
NRBC BLD MANUAL-RTO: 0 /100 WBC (ref 0–0)
PLATELET # BLD AUTO: 63 10*3/MM3 (ref 140–500)
PMV BLD AUTO: 12.3 FL (ref 6–12)
POTASSIUM BLD-SCNC: 4.1 MMOL/L (ref 3.5–5.2)
PROT SERPL-MCNC: 6.2 G/DL (ref 6–8.5)
RBC # BLD AUTO: 3.47 10*6/MM3 (ref 4.6–6)
SODIUM BLD-SCNC: 141 MMOL/L (ref 136–145)
WBC NRBC COR # BLD: 2.71 10*3/MM3 (ref 4.5–10.7)

## 2017-05-23 PROCEDURE — 85025 COMPLETE CBC W/AUTO DIFF WBC: CPT | Performed by: INTERNAL MEDICINE

## 2017-05-23 PROCEDURE — 25010000002 DEXAMETHASONE SODIUM PHOSPHATE 10 MG/ML SOLUTION 1 ML VIAL: Performed by: INTERNAL MEDICINE

## 2017-05-23 PROCEDURE — 96367 TX/PROPH/DG ADDL SEQ IV INF: CPT | Performed by: INTERNAL MEDICINE

## 2017-05-23 PROCEDURE — 80053 COMPREHEN METABOLIC PANEL: CPT | Performed by: INTERNAL MEDICINE

## 2017-05-23 PROCEDURE — 25010000002 FLUOROURACIL PER 500 MG: Performed by: INTERNAL MEDICINE

## 2017-05-23 PROCEDURE — 96375 TX/PRO/DX INJ NEW DRUG ADDON: CPT | Performed by: INTERNAL MEDICINE

## 2017-05-23 PROCEDURE — 25010000002 LEUCOVORIN CALCIUM PER 50 MG: Performed by: INTERNAL MEDICINE

## 2017-05-23 PROCEDURE — 96409 CHEMO IV PUSH SNGL DRUG: CPT | Performed by: INTERNAL MEDICINE

## 2017-05-23 PROCEDURE — 96366 THER/PROPH/DIAG IV INF ADDON: CPT | Performed by: INTERNAL MEDICINE

## 2017-05-23 PROCEDURE — 36415 COLL VENOUS BLD VENIPUNCTURE: CPT

## 2017-05-23 RX ORDER — SODIUM CHLORIDE 9 MG/ML
250 INJECTION, SOLUTION INTRAVENOUS ONCE
Status: COMPLETED | OUTPATIENT
Start: 2017-05-23 | End: 2017-05-23

## 2017-05-23 RX ORDER — FLUOROURACIL 50 MG/ML
500 INJECTION, SOLUTION INTRAVENOUS ONCE
Status: COMPLETED | OUTPATIENT
Start: 2017-05-23 | End: 2017-05-23

## 2017-05-23 RX ADMIN — DEXAMETHASONE SODIUM PHOSPHATE 12 MG: 10 INJECTION, SOLUTION INTRAMUSCULAR; INTRAVENOUS at 14:27

## 2017-05-23 RX ADMIN — FLUOROURACIL 1140 MG: 50 INJECTION, SOLUTION INTRAVENOUS at 15:44

## 2017-05-23 RX ADMIN — LEUCOVORIN CALCIUM 1140 MG: 350 INJECTION, POWDER, LYOPHILIZED, FOR SOLUTION INTRAMUSCULAR; INTRAVENOUS at 14:42

## 2017-05-23 RX ADMIN — SODIUM CHLORIDE 250 ML: 900 INJECTION, SOLUTION INTRAVENOUS at 14:15

## 2017-05-30 ENCOUNTER — INFUSION (OUTPATIENT)
Dept: ONCOLOGY | Facility: HOSPITAL | Age: 67
End: 2017-05-30

## 2017-05-30 ENCOUNTER — APPOINTMENT (OUTPATIENT)
Dept: ONCOLOGY | Facility: HOSPITAL | Age: 67
End: 2017-05-30

## 2017-05-30 DIAGNOSIS — C25.9 PANCREAS CARCINOMA (HCC): Primary | ICD-10-CM

## 2017-05-30 LAB
ALBUMIN SERPL-MCNC: 3.6 G/DL (ref 3.5–5.2)
ALBUMIN/GLOB SERPL: 1.4 G/DL
ALP SERPL-CCNC: 41 U/L (ref 39–117)
ALT SERPL W P-5'-P-CCNC: 38 U/L (ref 1–41)
ANION GAP SERPL CALCULATED.3IONS-SCNC: 10.4 MMOL/L
AST SERPL-CCNC: 44 U/L (ref 1–40)
BASOPHILS # BLD AUTO: 0.04 10*3/MM3 (ref 0–0.2)
BASOPHILS NFR BLD AUTO: 1.3 % (ref 0–1.5)
BILIRUB SERPL-MCNC: 0.7 MG/DL (ref 0.1–1.2)
BUN BLD-MCNC: 19 MG/DL (ref 8–23)
BUN/CREAT SERPL: 24.4 (ref 7–25)
CALCIUM SPEC-SCNC: 8.9 MG/DL (ref 8.6–10.5)
CHLORIDE SERPL-SCNC: 103 MMOL/L (ref 98–107)
CO2 SERPL-SCNC: 25.6 MMOL/L (ref 22–29)
CREAT BLD-MCNC: 0.78 MG/DL (ref 0.76–1.27)
DEPRECATED RDW RBC AUTO: 58.6 FL (ref 37–54)
EOSINOPHIL # BLD AUTO: 0.41 10*3/MM3 (ref 0–0.7)
EOSINOPHIL NFR BLD AUTO: 12.9 % (ref 0.3–6.2)
ERYTHROCYTE [DISTWIDTH] IN BLOOD BY AUTOMATED COUNT: 18.4 % (ref 11.5–14.5)
GFR SERPL CREATININE-BSD FRML MDRD: 100 ML/MIN/1.73
GLOBULIN UR ELPH-MCNC: 2.5 GM/DL
GLUCOSE BLD-MCNC: 118 MG/DL (ref 65–99)
HCT VFR BLD AUTO: 33.3 % (ref 40.4–52.2)
HGB BLD-MCNC: 11 G/DL (ref 13.7–17.6)
IMM GRANULOCYTES # BLD: 0 10*3/MM3 (ref 0–0.03)
IMM GRANULOCYTES NFR BLD: 0 % (ref 0–0.5)
LYMPHOCYTES # BLD AUTO: 0.6 10*3/MM3 (ref 0.9–4.8)
LYMPHOCYTES NFR BLD AUTO: 18.8 % (ref 19.6–45.3)
MCH RBC QN AUTO: 31.9 PG (ref 27–32.7)
MCHC RBC AUTO-ENTMCNC: 33 G/DL (ref 32.6–36.4)
MCV RBC AUTO: 96.5 FL (ref 79.8–96.2)
MONOCYTES # BLD AUTO: 0.3 10*3/MM3 (ref 0.2–1.2)
MONOCYTES NFR BLD AUTO: 9.4 % (ref 5–12)
NEUTROPHILS # BLD AUTO: 1.84 10*3/MM3 (ref 1.9–8.1)
NEUTROPHILS NFR BLD AUTO: 57.6 % (ref 42.7–76)
NRBC BLD MANUAL-RTO: 0 /100 WBC (ref 0–0)
PLATELET # BLD AUTO: 73 10*3/MM3 (ref 140–500)
PMV BLD AUTO: 11.2 FL (ref 6–12)
POTASSIUM BLD-SCNC: 3.9 MMOL/L (ref 3.5–5.2)
PROT SERPL-MCNC: 6.1 G/DL (ref 6–8.5)
RBC # BLD AUTO: 3.45 10*6/MM3 (ref 4.6–6)
SODIUM BLD-SCNC: 139 MMOL/L (ref 136–145)
WBC NRBC COR # BLD: 3.19 10*3/MM3 (ref 4.5–10.7)

## 2017-05-30 PROCEDURE — 25010000002 DEXAMETHASONE SODIUM PHOSPHATE 10 MG/ML SOLUTION 1 ML VIAL: Performed by: INTERNAL MEDICINE

## 2017-05-30 PROCEDURE — 85025 COMPLETE CBC W/AUTO DIFF WBC: CPT | Performed by: INTERNAL MEDICINE

## 2017-05-30 PROCEDURE — 96409 CHEMO IV PUSH SNGL DRUG: CPT | Performed by: INTERNAL MEDICINE

## 2017-05-30 PROCEDURE — 25010000002 FLUOROURACIL PER 500 MG: Performed by: INTERNAL MEDICINE

## 2017-05-30 PROCEDURE — 25010000002 LEUCOVORIN CALCIUM PER 50 MG: Performed by: INTERNAL MEDICINE

## 2017-05-30 PROCEDURE — 96367 TX/PROPH/DG ADDL SEQ IV INF: CPT | Performed by: INTERNAL MEDICINE

## 2017-05-30 PROCEDURE — 80053 COMPREHEN METABOLIC PANEL: CPT | Performed by: INTERNAL MEDICINE

## 2017-05-30 PROCEDURE — 96366 THER/PROPH/DIAG IV INF ADDON: CPT | Performed by: INTERNAL MEDICINE

## 2017-05-30 PROCEDURE — 96375 TX/PRO/DX INJ NEW DRUG ADDON: CPT | Performed by: INTERNAL MEDICINE

## 2017-05-30 PROCEDURE — 36415 COLL VENOUS BLD VENIPUNCTURE: CPT

## 2017-05-30 RX ORDER — SODIUM CHLORIDE 9 MG/ML
250 INJECTION, SOLUTION INTRAVENOUS ONCE
Status: COMPLETED | OUTPATIENT
Start: 2017-05-30 | End: 2017-05-30

## 2017-05-30 RX ORDER — FLUOROURACIL 50 MG/ML
500 INJECTION, SOLUTION INTRAVENOUS ONCE
Status: COMPLETED | OUTPATIENT
Start: 2017-05-30 | End: 2017-05-30

## 2017-05-30 RX ADMIN — SODIUM CHLORIDE 250 ML: 900 INJECTION, SOLUTION INTRAVENOUS at 13:38

## 2017-05-30 RX ADMIN — DEXAMETHASONE SODIUM PHOSPHATE 12 MG: 10 INJECTION, SOLUTION INTRAMUSCULAR; INTRAVENOUS at 13:38

## 2017-05-30 RX ADMIN — LEUCOVORIN CALCIUM 1140 MG: 350 INJECTION, POWDER, LYOPHILIZED, FOR SOLUTION INTRAMUSCULAR; INTRAVENOUS at 13:54

## 2017-05-30 RX ADMIN — FLUOROURACIL 1140 MG: 50 INJECTION, SOLUTION INTRAVENOUS at 14:53

## 2017-06-02 DIAGNOSIS — C25.9 PANCREAS CARCINOMA (HCC): ICD-10-CM

## 2017-06-05 DIAGNOSIS — C25.9 PANCREAS CARCINOMA (HCC): ICD-10-CM

## 2017-06-05 RX ORDER — SODIUM CHLORIDE 9 MG/ML
250 INJECTION, SOLUTION INTRAVENOUS ONCE
Status: CANCELLED | OUTPATIENT
Start: 2017-06-06

## 2017-06-05 RX ORDER — SODIUM CHLORIDE 9 MG/ML
250 INJECTION, SOLUTION INTRAVENOUS ONCE
Status: CANCELLED | OUTPATIENT
Start: 2017-01-01

## 2017-06-05 RX ORDER — FLUOROURACIL 50 MG/ML
500 INJECTION, SOLUTION INTRAVENOUS ONCE
Status: CANCELLED | OUTPATIENT
Start: 2017-01-01

## 2017-06-05 RX ORDER — FLUOROURACIL 50 MG/ML
500 INJECTION, SOLUTION INTRAVENOUS ONCE
Status: CANCELLED | OUTPATIENT
Start: 2017-06-13

## 2017-06-05 RX ORDER — FLUOROURACIL 50 MG/ML
500 INJECTION, SOLUTION INTRAVENOUS ONCE
Status: CANCELLED | OUTPATIENT
Start: 2017-06-06

## 2017-06-06 ENCOUNTER — INFUSION (OUTPATIENT)
Dept: ONCOLOGY | Facility: HOSPITAL | Age: 67
End: 2017-06-06

## 2017-06-06 DIAGNOSIS — C25.9 PANCREAS CARCINOMA (HCC): Primary | ICD-10-CM

## 2017-06-06 DIAGNOSIS — C25.9 PANCREAS CARCINOMA (HCC): ICD-10-CM

## 2017-06-06 LAB
ALBUMIN SERPL-MCNC: 3.4 G/DL (ref 3.5–5.2)
ALBUMIN/GLOB SERPL: 1.3 G/DL
ALP SERPL-CCNC: 39 U/L (ref 39–117)
ALT SERPL W P-5'-P-CCNC: 58 U/L (ref 1–41)
ANION GAP SERPL CALCULATED.3IONS-SCNC: 11.3 MMOL/L
AST SERPL-CCNC: 73 U/L (ref 1–40)
BASOPHILS # BLD AUTO: 0.03 10*3/MM3 (ref 0–0.2)
BASOPHILS NFR BLD AUTO: 1 % (ref 0–1.5)
BILIRUB SERPL-MCNC: 0.6 MG/DL (ref 0.1–1.2)
BUN BLD-MCNC: 14 MG/DL (ref 8–23)
BUN/CREAT SERPL: 18.2 (ref 7–25)
CALCIUM SPEC-SCNC: 8.8 MG/DL (ref 8.6–10.5)
CHLORIDE SERPL-SCNC: 101 MMOL/L (ref 98–107)
CO2 SERPL-SCNC: 25.7 MMOL/L (ref 22–29)
CREAT BLD-MCNC: 0.77 MG/DL (ref 0.76–1.27)
DEPRECATED RDW RBC AUTO: 66.3 FL (ref 37–54)
EOSINOPHIL # BLD AUTO: 0.3 10*3/MM3 (ref 0–0.7)
EOSINOPHIL NFR BLD AUTO: 10.3 % (ref 0.3–6.2)
ERYTHROCYTE [DISTWIDTH] IN BLOOD BY AUTOMATED COUNT: 19.8 % (ref 11.5–14.5)
GFR SERPL CREATININE-BSD FRML MDRD: 101 ML/MIN/1.73
GLOBULIN UR ELPH-MCNC: 2.7 GM/DL
GLUCOSE BLD-MCNC: 106 MG/DL (ref 65–99)
HCT VFR BLD AUTO: 32 % (ref 40.4–52.2)
HGB BLD-MCNC: 10.8 G/DL (ref 13.7–17.6)
IMM GRANULOCYTES # BLD: 0 10*3/MM3 (ref 0–0.03)
IMM GRANULOCYTES NFR BLD: 0 % (ref 0–0.5)
LYMPHOCYTES # BLD AUTO: 0.56 10*3/MM3 (ref 0.9–4.8)
LYMPHOCYTES NFR BLD AUTO: 19.2 % (ref 19.6–45.3)
MCH RBC QN AUTO: 32.7 PG (ref 27–32.7)
MCHC RBC AUTO-ENTMCNC: 33.8 G/DL (ref 32.6–36.4)
MCV RBC AUTO: 97 FL (ref 79.8–96.2)
MONOCYTES # BLD AUTO: 0.32 10*3/MM3 (ref 0.2–1.2)
MONOCYTES NFR BLD AUTO: 11 % (ref 5–12)
NEUTROPHILS # BLD AUTO: 1.71 10*3/MM3 (ref 1.9–8.1)
NEUTROPHILS NFR BLD AUTO: 58.5 % (ref 42.7–76)
NRBC BLD MANUAL-RTO: 0 /100 WBC (ref 0–0)
PLATELET # BLD AUTO: 91 10*3/MM3 (ref 140–500)
PMV BLD AUTO: 11.8 FL (ref 6–12)
POTASSIUM BLD-SCNC: 4.1 MMOL/L (ref 3.5–5.2)
PROT SERPL-MCNC: 6.1 G/DL (ref 6–8.5)
RBC # BLD AUTO: 3.3 10*6/MM3 (ref 4.6–6)
SODIUM BLD-SCNC: 138 MMOL/L (ref 136–145)
WBC NRBC COR # BLD: 2.92 10*3/MM3 (ref 4.5–10.7)

## 2017-06-06 PROCEDURE — 96367 TX/PROPH/DG ADDL SEQ IV INF: CPT | Performed by: INTERNAL MEDICINE

## 2017-06-06 PROCEDURE — 25010000002 DEXAMETHASONE SODIUM PHOSPHATE 10 MG/ML SOLUTION 1 ML VIAL: Performed by: INTERNAL MEDICINE

## 2017-06-06 PROCEDURE — 96409 CHEMO IV PUSH SNGL DRUG: CPT | Performed by: INTERNAL MEDICINE

## 2017-06-06 PROCEDURE — 80053 COMPREHEN METABOLIC PANEL: CPT | Performed by: INTERNAL MEDICINE

## 2017-06-06 PROCEDURE — 96375 TX/PRO/DX INJ NEW DRUG ADDON: CPT | Performed by: INTERNAL MEDICINE

## 2017-06-06 PROCEDURE — 85025 COMPLETE CBC W/AUTO DIFF WBC: CPT | Performed by: INTERNAL MEDICINE

## 2017-06-06 PROCEDURE — 25010000002 LEUCOVORIN 200 MG RECONSTITUTED SOLUTION 1 EACH VIAL: Performed by: INTERNAL MEDICINE

## 2017-06-06 PROCEDURE — 96366 THER/PROPH/DIAG IV INF ADDON: CPT | Performed by: INTERNAL MEDICINE

## 2017-06-06 PROCEDURE — 25010000002 FLUOROURACIL PER 500 MG: Performed by: INTERNAL MEDICINE

## 2017-06-06 RX ORDER — SODIUM CHLORIDE 9 MG/ML
250 INJECTION, SOLUTION INTRAVENOUS ONCE
Status: COMPLETED | OUTPATIENT
Start: 2017-06-06 | End: 2017-06-06

## 2017-06-06 RX ORDER — FLUOROURACIL 50 MG/ML
500 INJECTION, SOLUTION INTRAVENOUS ONCE
Status: COMPLETED | OUTPATIENT
Start: 2017-06-06 | End: 2017-06-06

## 2017-06-06 RX ADMIN — FLUOROURACIL 1140 MG: 50 INJECTION, SOLUTION INTRAVENOUS at 15:10

## 2017-06-06 RX ADMIN — SODIUM CHLORIDE 250 ML: 900 INJECTION, SOLUTION INTRAVENOUS at 13:40

## 2017-06-06 RX ADMIN — DEXAMETHASONE SODIUM PHOSPHATE 12 MG: 10 INJECTION, SOLUTION INTRAMUSCULAR; INTRAVENOUS at 13:44

## 2017-06-06 RX ADMIN — LEUCOVORIN CALCIUM 1140 MG: 200 INJECTION, POWDER, LYOPHILIZED, FOR SOLUTION INTRAMUSCULAR; INTRAVENOUS at 14:06

## 2017-06-07 RX ORDER — ALPRAZOLAM 0.5 MG/1
0.5 TABLET ORAL 2 TIMES DAILY PRN
Qty: 60 TABLET | Refills: 1 | OUTPATIENT
Start: 2017-06-07 | End: 2017-01-01 | Stop reason: SDUPTHER

## 2017-06-07 RX ORDER — LANOLIN ALCOHOL/MO/W.PET/CERES
CREAM (GRAM) TOPICAL
Qty: 30 TABLET | Refills: 2 | Status: SHIPPED | OUTPATIENT
Start: 2017-06-07 | End: 2017-01-01 | Stop reason: SDUPTHER

## 2017-06-13 ENCOUNTER — INFUSION (OUTPATIENT)
Dept: ONCOLOGY | Facility: HOSPITAL | Age: 67
End: 2017-06-13

## 2017-06-13 ENCOUNTER — OFFICE VISIT (OUTPATIENT)
Dept: ONCOLOGY | Facility: CLINIC | Age: 67
End: 2017-06-13

## 2017-06-13 VITALS
DIASTOLIC BLOOD PRESSURE: 74 MMHG | SYSTOLIC BLOOD PRESSURE: 119 MMHG | TEMPERATURE: 97.7 F | HEART RATE: 66 BPM | BODY MASS INDEX: 30.74 KG/M2 | WEIGHT: 233 LBS | RESPIRATION RATE: 16 BRPM | OXYGEN SATURATION: 97 %

## 2017-06-13 DIAGNOSIS — C79.51 METASTASIS TO SPINAL COLUMN (HCC): ICD-10-CM

## 2017-06-13 DIAGNOSIS — Z45.2 ENCOUNTER FOR ADJUSTMENT OR MANAGEMENT OF VASCULAR ACCESS DEVICE: Primary | ICD-10-CM

## 2017-06-13 DIAGNOSIS — T45.1X5A LEUKOPENIA DUE TO ANTINEOPLASTIC CHEMOTHERAPY (HCC): ICD-10-CM

## 2017-06-13 DIAGNOSIS — C25.9 PANCREAS CARCINOMA (HCC): ICD-10-CM

## 2017-06-13 DIAGNOSIS — C78.7 LIVER METASTASES: ICD-10-CM

## 2017-06-13 DIAGNOSIS — G62.0 NEUROPATHY DUE TO CHEMOTHERAPEUTIC DRUG (HCC): ICD-10-CM

## 2017-06-13 DIAGNOSIS — C79.51 BONE METASTASIS: ICD-10-CM

## 2017-06-13 DIAGNOSIS — D69.6 ACQUIRED THROMBOCYTOPENIA (HCC): ICD-10-CM

## 2017-06-13 DIAGNOSIS — G89.3 CANCER ASSOCIATED PAIN: ICD-10-CM

## 2017-06-13 DIAGNOSIS — D70.1 LEUKOPENIA DUE TO ANTINEOPLASTIC CHEMOTHERAPY (HCC): ICD-10-CM

## 2017-06-13 DIAGNOSIS — D63.0 ANEMIA IN NEOPLASTIC DISEASE: ICD-10-CM

## 2017-06-13 DIAGNOSIS — T45.1X5A NEUROPATHY DUE TO CHEMOTHERAPEUTIC DRUG (HCC): ICD-10-CM

## 2017-06-13 DIAGNOSIS — C25.9 PANCREAS CARCINOMA (HCC): Primary | ICD-10-CM

## 2017-06-13 DIAGNOSIS — Z45.2 ENCOUNTER FOR ADJUSTMENT OR MANAGEMENT OF VASCULAR ACCESS DEVICE: ICD-10-CM

## 2017-06-13 DIAGNOSIS — D69.6 THROMBOCYTOPENIA DUE TO SEQUESTRATION (HCC): ICD-10-CM

## 2017-06-13 DIAGNOSIS — R16.1 SPLENOMEGALY: ICD-10-CM

## 2017-06-13 LAB
ALBUMIN SERPL-MCNC: 3.3 G/DL (ref 3.5–5.2)
ALBUMIN/GLOB SERPL: 1.4 G/DL
ALP SERPL-CCNC: 34 U/L (ref 39–117)
ALT SERPL W P-5'-P-CCNC: 49 U/L (ref 1–41)
ANION GAP SERPL CALCULATED.3IONS-SCNC: 12.3 MMOL/L
AST SERPL-CCNC: 57 U/L (ref 1–40)
BASOPHILS # BLD AUTO: 0.03 10*3/MM3 (ref 0–0.2)
BASOPHILS NFR BLD AUTO: 1.4 % (ref 0–1.5)
BILIRUB SERPL-MCNC: 0.8 MG/DL (ref 0.1–1.2)
BUN BLD-MCNC: 17 MG/DL (ref 8–23)
BUN/CREAT SERPL: 19.8 (ref 7–25)
CALCIUM SPEC-SCNC: 8.7 MG/DL (ref 8.6–10.5)
CHLORIDE SERPL-SCNC: 104 MMOL/L (ref 98–107)
CO2 SERPL-SCNC: 24.7 MMOL/L (ref 22–29)
CREAT BLD-MCNC: 0.86 MG/DL (ref 0.76–1.27)
DEPRECATED RDW RBC AUTO: 70.5 FL (ref 37–54)
EOSINOPHIL # BLD AUTO: 0.24 10*3/MM3 (ref 0–0.7)
EOSINOPHIL NFR BLD AUTO: 11.5 % (ref 0.3–6.2)
ERYTHROCYTE [DISTWIDTH] IN BLOOD BY AUTOMATED COUNT: 21.2 % (ref 11.5–14.5)
FERRITIN SERPL-MCNC: 699.8 NG/ML (ref 30–400)
FOLATE SERPL-MCNC: >20 NG/ML (ref 4.78–24.2)
GFR SERPL CREATININE-BSD FRML MDRD: 89 ML/MIN/1.73
GLOBULIN UR ELPH-MCNC: 2.4 GM/DL
GLUCOSE BLD-MCNC: 121 MG/DL (ref 65–99)
HCT VFR BLD AUTO: 29 % (ref 40.4–52.2)
HGB BLD-MCNC: 9.6 G/DL (ref 13.7–17.6)
HGB RETIC QN: 42.8 PG (ref 32.7–38.6)
IMM GRANULOCYTES # BLD: 0 10*3/MM3 (ref 0–0.03)
IMM GRANULOCYTES NFR BLD: 0 % (ref 0–0.5)
IMM RETICS NFR: 7.6 % (ref 0.7–13.7)
IRON 24H UR-MRATE: 99 MCG/DL (ref 59–158)
IRON SATN MFR SERPL: 31 % (ref 20–50)
LYMPHOCYTES # BLD AUTO: 0.3 10*3/MM3 (ref 0.9–4.8)
LYMPHOCYTES NFR BLD AUTO: 14.4 % (ref 19.6–45.3)
MCH RBC QN AUTO: 32.5 PG (ref 27–32.7)
MCHC RBC AUTO-ENTMCNC: 33.1 G/DL (ref 32.6–36.4)
MCV RBC AUTO: 98.3 FL (ref 79.8–96.2)
MONOCYTES # BLD AUTO: 0.28 10*3/MM3 (ref 0.2–1.2)
MONOCYTES NFR BLD AUTO: 13.4 % (ref 5–12)
NEUTROPHILS # BLD AUTO: 1.24 10*3/MM3 (ref 1.9–8.1)
NEUTROPHILS NFR BLD AUTO: 59.3 % (ref 42.7–76)
NRBC BLD MANUAL-RTO: 0 /100 WBC (ref 0–0)
PLATELET # BLD AUTO: 71 10*3/MM3 (ref 140–500)
PMV BLD AUTO: 12.1 FL (ref 6–12)
POTASSIUM BLD-SCNC: 4.1 MMOL/L (ref 3.5–5.2)
PROT SERPL-MCNC: 5.7 G/DL (ref 6–8.5)
RBC # BLD AUTO: 2.95 10*6/MM3 (ref 4.6–6)
RETICS/RBC NFR AUTO: 1.14 % (ref 0.5–1.5)
SODIUM BLD-SCNC: 141 MMOL/L (ref 136–145)
TIBC SERPL-MCNC: 316 MCG/DL (ref 298–536)
TRANSFERRIN SERPL-MCNC: 212 MG/DL (ref 200–360)
VIT B12 BLD-MCNC: 417 PG/ML (ref 211–946)
WBC NRBC COR # BLD: 2.09 10*3/MM3 (ref 4.5–10.7)

## 2017-06-13 PROCEDURE — 99214 OFFICE O/P EST MOD 30 MIN: CPT | Performed by: INTERNAL MEDICINE

## 2017-06-13 PROCEDURE — 86301 IMMUNOASSAY TUMOR CA 19-9: CPT | Performed by: INTERNAL MEDICINE

## 2017-06-13 PROCEDURE — 82607 VITAMIN B-12: CPT | Performed by: INTERNAL MEDICINE

## 2017-06-13 PROCEDURE — 84466 ASSAY OF TRANSFERRIN: CPT | Performed by: INTERNAL MEDICINE

## 2017-06-13 PROCEDURE — 96523 IRRIG DRUG DELIVERY DEVICE: CPT | Performed by: INTERNAL MEDICINE

## 2017-06-13 PROCEDURE — 80053 COMPREHEN METABOLIC PANEL: CPT | Performed by: INTERNAL MEDICINE

## 2017-06-13 PROCEDURE — 85025 COMPLETE CBC W/AUTO DIFF WBC: CPT | Performed by: INTERNAL MEDICINE

## 2017-06-13 PROCEDURE — 82728 ASSAY OF FERRITIN: CPT | Performed by: INTERNAL MEDICINE

## 2017-06-13 PROCEDURE — 82746 ASSAY OF FOLIC ACID SERUM: CPT | Performed by: INTERNAL MEDICINE

## 2017-06-13 PROCEDURE — 83540 ASSAY OF IRON: CPT | Performed by: INTERNAL MEDICINE

## 2017-06-13 PROCEDURE — 85046 RETICYTE/HGB CONCENTRATE: CPT | Performed by: INTERNAL MEDICINE

## 2017-06-13 PROCEDURE — 25010000002 HEPARIN FLUSH (PORCINE) 100 UNIT/ML SOLUTION: Performed by: INTERNAL MEDICINE

## 2017-06-13 RX ORDER — SODIUM CHLORIDE 0.9 % (FLUSH) 0.9 %
10 SYRINGE (ML) INJECTION AS NEEDED
Status: CANCELLED | OUTPATIENT
Start: 2017-06-13

## 2017-06-13 RX ORDER — SODIUM CHLORIDE 0.9 % (FLUSH) 0.9 %
10 SYRINGE (ML) INJECTION AS NEEDED
Status: DISCONTINUED | OUTPATIENT
Start: 2017-06-13 | End: 2017-06-13 | Stop reason: HOSPADM

## 2017-06-13 RX ADMIN — Medication 20 ML: at 13:58

## 2017-06-13 RX ADMIN — SODIUM CHLORIDE, PRESERVATIVE FREE 500 UNITS: 5 INJECTION INTRAVENOUS at 13:58

## 2017-06-13 NOTE — PROGRESS NOTES
REASONS FOR FOLLOWUP:    Metastatic pancreatic cancer to liver and sacrum.  The patient has undergone successful plan of radiation treatment to the sacrum on 2 different occasions and stereotactic treatment for a liver metastasis, solitary.  The patient also has undergone therapy with Eloxatin/Xeloda successfully.Continuation of weekly 5fu and leucovorin as per 5/16/17    HISTORY OF PRESENT ILLNESS:   This patient is here today in company of his wife, stating that he continues doing relatively well from the point of view of his pancreatic cancer with liver and spinal metastasis. He has been back on chemotherapy for almost 3 weeks. He continues doing well from the point of view of pain control and he has seen Dr. Shaheed Bertrand for this. His pump has been recently refilled with new pain medication. He is only taking 4 pain pills a day and he feels well. His appetite is good. His bowel function is normal. No nausea. No vomiting. No abdominal pain. No jaundice. Normal urination. Normal defecation. No incontinence. No cardiorespiratory symptomatology. No modification in the intensity of his bone pain. He remains functional, capable of walking through the house and taking rides in the car. 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 His weight remains stable.        Because of malfunction of the port the patient underwent a port dye study that documented leakage at the port site into the entrance of the catheter into the vein. For this reason the patient was seen by Dr. Rogers, general surgery, the old port was removed and a new port was installed with no difficulties. This is working fine. Otherwise the patient feels well.                  PAST MEDICAL HISTORY:     1.  Morbid obesity.     2. History of hypertension.     3. Hyperlipidemia.     4. He has no history of diabetes, neither history of thyroid disease,  coronary disease, and cardiac arrhythmia.    5.   He had a syncopal episode in December 2014.  Conclusion was that the patient had an episode of low blood pressure and these medicines were adjusted accordingly.     6. He has no history of colonoscopy in the past.     7. History of enlarged prostate with a normal PSA.     8. 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     9.   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  His diplopia improved spontaneously.     10. He has no history of previous surgeries.         HEMATOLOGIC/ONCOLOGIC HISTORY: History from previous dates can be found in the separate document.         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  We adjusted down the E  loxatin to 170 mg total, given his minor leukopenia and minor thrombocytopenia.          His pain medication will remain the same.  His doxepin will remain the same.  In regard to extravasation   of Eloxatin in the left upper extremity, local therapy will remain the same.      The patient is next seen in the office March 07, 2016 stable for all aspects.  We plan to continue chemotherapy at the same doses at this time and scan him in the next several months and follow-up      ALLERGIES:  No known drug allergies.         MEDICATIONS:  The current medication list was reviewed with the patient and updated in the EMR this date per the medical assistant.  Medication dosages and frequencies were confirmed to be accurate.     SOCIAL   HISTORY: The patient used to work for [employer] for a long time, most of his life.  He retired many years ago.  He stays at home busy doing many activities, including fishing, mowing, gardening, and many other chores.  He was  exposed to chemicals w  h  en he worked at [employer], but he used to have protective equipment. He is not aware of any other coworkers who have been exposed to anything, or diagnosed with cancer.  He has 3 stepchildren in good health.  He used to smoke for 20 years; he quit   30 years ago.  He used to smoke a pack of cigarettes a day.  He does not drink any alcohol.         FAMILY HISTORY:  His mother  as a consequence of primary liver cancer and she was diagnosed in .  He had a sister who had multiple myeloma and received care at [facility].  His wife and the children do not have any other significant illnesses.           REVIEW OF SYSTEMS:   General: no fever, chills, fatigue, weight changes, or lack of appetite.  Eyes: no epiphora, xerophthalmia,conjunctivitis, pain, glaucoma, blurred vision, blindness, secretion, photophobia, proptosis, diplopia.  Ears: no otorrhea, tinnitus, otorrhagia, deafness, pain, vertigo.  Nose: no rhinorrhea, epistaxis, alteration in perception of odors, sinuses pressure.  Mouth: no alteration in gums or teeth,  ulcers, no difficulty with mastication or deglut ion, no odynophagia.  Neck: no masses or pain, no thyroid alterations, no pain in muscles or arteries, no carotid odynia, no crepitation.  Respiratory: no cough, sputum production, dyspnea, trepopnea, pleuritic pain, hemoptysis.  Heart: no syncope, irregularity, palpitations, angina, orthopnea, paroxysmal nocturnal dyspnea.  Vascular Venous: no tenderness,edema, palpable cords, postphlebitic syndrome, skin changes or ulcerations.  Vascular Arterial: no distal ischemia, claudication, gangrene, neuropathic ischemic pain, skin ulcers, paleness or cyanosis.  GI: no dysphagia, odynophagia, no regurgitation, no heartburn,no indigestion,no nausea,no vomiting,no hematemesis ,no melena,no jaundice,no distention, no obstipation,no enterorrhagia,no proctalgia,no anal  lesions, nochanges in bowel habits.  : no frequency,  hesitancy, hematuria, discharge, pain.  Musculoskeletal: no muscle or tendon pain or inflammation, joint pain, edema, functional limitation as expected given his bone mets and neuropathic pain,no fasciculations, mass.  Neurologic: no headache, seizures, alterations on Craneal nerves, no motor or senssory deficit, normal coordination, no alteration in memory, orientation, calculation,writting, verbal or written language.  Skin: no rashes, pruritus , no new changes in his left forearm lesion.  Psychiatric: no anxiety, depression, agitation, delusions, proper insight.        VITAL SIGNS:   Vitals:    06/13/17 1310   BP: 119/74   Pulse: 66   Resp: 16   Temp: 97.7 °F (36.5 °C)   TempSrc: Oral   SpO2: 97%          PHYSICAL EXAMINATION:      GENERAL:  White male in no acute distress, lying on the bed on his side.  The degree of pain intensity was no more than 2 out of 10.     SKIN:  Lesion in the skin in his forearm anteriorly on the left side remains indurated with no pain, no eryth  ema, no local inflammation with normal range-of-motion in the flexion and extension of the left elbow.  He had no edema in his hand.  The area of infiltration of the skin measures close to 2 x 2 cm in size.  The rest of the skin examination reveals mild erythema with occasional papular lesion.       EYES:  Pupils were equal and reactive to light and accommodation.  Extraocular movements were normal.     MOUTH:  Oral mucosals were normal.    NECK:  Disclosed no palpable cervical adenopathies, no thyroid enlargement. CHEST:  Lungs were clear with good breath sounds bilaterally.  No wheezing, crackles, rhonchi or rubs.  His heart was regular with occasional premature contractions.     ABDOMEN:  Soft, nontender, nodular  liver enlargement 2 cm brcm,.  No splenomegaly.  No palpable abdominal masses.  No ascites.  No pain.     MUSCULOSKELETAL:  Disclosed minimal tenderness in the sacrum.  No soft tissue mass, no deformity, no local increase in the  temperature.  No cauda equina syndrome.     EXTREMITIES: Disclosed no tenderness, no edema.  Left upper extremity as already described-improved from previous  NEUROLOGICAL:  Showed a patient that was fully awake, alert, oriented to time and place, able to carry a normal conversation, walking with some degree of difficulty given the intensity of the pain in the sacrum.         LABORATORY DATA:   Component      Latest Ref Rng & Units 2/14/2017 4/11/2017 5/16/2017   CA 19-9      0 - 35 U/mL 74 (H) 74.3 84 (H)       Auto Differential   Order: [id number] - Part of Panel Order [id number]   Status:  Final result   Visible to patient:  No (Not Released) Dx:  Bone metastasis; Pancreas carcinoma; ...      Ref Range & Units 1:25 PM     WBC 4.50 - 10.70 10*3/mm3 2.09 (L)   RBC 4.60 - 6.00 10*6/mm3 2.95 (L)   Hemoglobin 13.7 - 17.6 g/dL 9.6 (L)   Hematocrit 40.4 - 52.2 % 29.0 (L)   MCV 79.8 - 96.2 fL 98.3 (H)   MCH 27.0 - 32.7 pg 32.5   MCHC 32.6 - 36.4 g/dL 33.1   RDW 11.5 - 14.5 % 21.2 (H)   RDW-SD 37.0 - 54.0 fl 70.5 (H)   MPV 6.0 - 12.0 fL 12.1 (H)   Platelets 140 - 500 10*3/mm3 71 (L)   Neutrophil % 42.7 - 76.0 % 59.3   Lymphocyte % 19.6 - 45.3 % 14.4 (L)   Monocyte % 5.0 - 12.0 % 13.4 (H)   Eosinophil % 0.3 - 6.2 % 11.5 (H)   Basophil % 0.0 - 1.5 % 1.4   Immature Grans % 0.0 - 0.5 % 0.0   Neutrophils, Absolute 1.90 - 8.10 10*3/mm3 1.24 (L)   Lymphocytes, Absolute 0.90 - 4.80 10*3/mm3 0.30 (L)   Monocytes, Absolute 0.20 - 1.20 10*3/mm3 0.28   Eosinophils, Absolute 0.00 - 0.70 10*3/mm3 0.24   Basophils, Absolute 0.00 - 0.20 10*3/mm3 0.03   Immature Grans, Absolute 0.00 - 0.03 10*3/mm3 0.00   nRBC 0.0 - 0.0 /100 WBC 0.0   Resulting Agency   [facility]      Specimen Collected: 06/13/17  1:25 PM Last Resulted: 06/13/17  1:43 PM                  ASSESSMENT:        1. This patient has metastatic pancreatic cancer to the sacrum, bone and to the liver undergoing chemotherapy with 5FU and leucovorin on a weekly basis. His clinical  situation looks very stable in absence of any modification of the intensity of the pain in the sacrum and no symptoms related to the liver metastasis. No cardiorespiratory symptomatology at all. He has not developed any mucositis, diarrhea, photosensitivity but today the patient has developed complications of his treatment including anemia, leukopenia and thrombocytopenia very likely related to chemotherapy that will require withholding his treatment today and adjusting the doses of the medications for future treatment, decreasing the dose of both 5FU and leucovorin down by 15%.   2. Given the anemia, leukopenia and thrombocytopenia we are going to proceed with measurement of B12, folic acid level, ferritin, iron, TIBC. If any abnormalities are found he will be called at home and reported to do the proper corrections.  3. We will postpone his chemotherapy today until next week until the blood counts further recover. His platelet count needs to be above 80,000 and his white count needs to be above 3000 with an ANC close to 1800 before we can resume chemotherapy treatment.  4. In regard to his pain medication, he will continue seeing Dr. Shaheed Bertrand for control of this. His dose through the infusional pump has mildly increased recently and the patient otherwise has not had any need for any other modification in the dosing. He is now receiving Meloxicam with some mild further help.   5. 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 This is a chronic issue related to fatty liver infiltration likely a component of cirrhosis associated with previous chemotherapy with FOLFOX. This will be watched in absence of any other intervention.  6. Irritation of the skin and scar tissue formation in the left forearm associated with previous extravasation of oxaliplatin. This area seems to be that it is no longer inflamed mobility and function of the left upper extremity remains normal.     RECOMMENDATIONS:  1. Modification  in the dosing of chemotherapy medicine as stated above.  2. Continue pain medicine by Dr. Shaheed Bertrand.  3. CT scan of the chest, abdomen and pelvis planned to be done in 3 weeks.  4. Visit with MD in 4 weeks.  5. Continuation of chemotherapy as stated above according with modifications as clarified to the nurses today.   6. The patient will remain as well on his aspirin a day and his vitamin D 1000 units a day.

## 2017-06-13 NOTE — PROGRESS NOTES
Per order of Dr. Manley 5FU, and Leucovorin dose reduced by 15%, and treatment moved out a week.

## 2017-06-15 LAB
CANCER AG19-9 SERPL-ACNC: 77 U/ML (ref 0–35)
CANCER AG19-9 SERPL-ACNC: 78 U/ML (ref 0–35)

## 2017-06-19 ENCOUNTER — TELEPHONE (OUTPATIENT)
Dept: ONCOLOGY | Facility: CLINIC | Age: 67
End: 2017-06-19

## 2017-06-19 DIAGNOSIS — C25.9 PANCREAS CARCINOMA (HCC): ICD-10-CM

## 2017-06-19 RX ORDER — FLUOROURACIL 50 MG/ML
425 INJECTION, SOLUTION INTRAVENOUS ONCE
Status: CANCELLED | OUTPATIENT
Start: 2017-01-01

## 2017-06-20 NOTE — PROGRESS NOTES
CBC R/W DR. WADE. HOLD TREATMENT TODAY AND GIVE GRANIX 480MCG X 1 DOSE. PT TO THEN RTN NEXT TUES AS SCHEDULED. PT AND WIFE MADE AWARE AND V/U.

## 2017-07-05 NOTE — PROGRESS NOTES
PER DR. WADE RUN LEUCO/5FU THE OLD WAY - 1HR LEUCO, 5FU THEN 1 HR LEUCO.    PT'S CMP RESULTS R/W SC NP. OK TO TREAT TODAY.

## 2017-07-12 NOTE — PROGRESS NOTES
Leucovorin given over 2 hrs, 5Fu given slow IVP at one hour dennis of leucovorin infusions.   Remainder of leucovorin given over 1 hour

## 2017-07-12 NOTE — PROGRESS NOTES
REASONS FOR FOLLOWUP:    Metastatic pancreatic cancer to liver and sacrum.  The patient has undergone successful plan of radiation treatment to the sacrum on 2 different occasions and stereotactic treatment for a liver metastasis, solitary.  The patient also has undergone therapy with Eloxatin/Xeloda successfully.Continuation of weekly 5fu and leucovorin as per 5/16/17    HISTORY OF PRESENT ILLNESS:   This patient is here today in company of his wife, stating that he continues doing relatively well from the point of view of his pancreatic cancer with liver and spinal metastasis. He has been back on chemotherapy for almost 3 weeks. He continues doing well from the point of view of pain control and he has seen Dr. Shaheed Bertrand for this. His pump has been recently refilled with new pain medication. He is only taking 4 pain pills a day and he feels well. His appetite is good. His bowel function is normal. No nausea. No vomiting. No abdominal pain. No jaundice. Normal urination. Normal defecation. No incontinence. No cardiorespiratory symptomatology. No modification in the intensity of his bone pain. He remains functional, capable of walking through the house and taking rides in the car. 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 His weight remains stable.        Because of malfunction of the port the patient underwent a port dye study that documented leakage at the port site into the entrance of the catheter into the vein. For this reason the patient was seen by Dr. Rogers, general surgery, the old port was removed and a new port was installed with no difficulties. This is working fine. Otherwise the patient feels well.                  PAST MEDICAL HISTORY:     1.  Morbid obesity.     2. History of hypertension.     3. Hyperlipidemia.     4. He has no history of diabetes, neither history of thyroid disease,  coronary disease, and cardiac arrhythmia.    5.   He had a syncopal episode in December 2014.  Conclusion was that the patient had an episode of low blood pressure and these medicines were adjusted accordingly.     6. He has no history of colonoscopy in the past.     7. History of enlarged prostate with a normal PSA.     8. 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     9.   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  His diplopia improved spontaneously.     10. He has no history of previous surgeries.         HEMATOLOGIC/ONCOLOGIC HISTORY: History from previous dates can be found in the separate document.         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  We adjusted down the E  loxatin to 170 mg total, given his minor leukopenia and minor thrombocytopenia.          His pain medication will remain the same.  His doxepin will remain the same.  In regard to extravasation   of Eloxatin in the left upper extremity, local therapy will remain the same.      The patient is next seen in the office March 07, 2016 stable for all aspects.  We plan to continue chemotherapy at the same doses at this time and scan him in the next several months and follow-up      ALLERGIES:  No known drug allergies.         MEDICATIONS:  The current medication list was reviewed with the patient and updated in the EMR this date per the medical assistant.  Medication dosages and frequencies were confirmed to be accurate.     SOCIAL   HISTORY: The patient used to work for [employer] for a long time, most of his life.  He retired many years ago.  He stays at home busy doing many activities, including fishing, mowing, gardening, and many other chores.  He was  exposed to chemicals w  h  en he worked at [employer], but he used to have protective equipment. He is not aware of any other coworkers who have been exposed to anything, or diagnosed with cancer.  He has 3 stepchildren in good health.  He used to smoke for 20 years; he quit   30 years ago.  He used to smoke a pack of cigarettes a day.  He does not drink any alcohol.         FAMILY HISTORY:  His mother  as a consequence of primary liver cancer and she was diagnosed in .  He had a sister who had multiple myeloma and received care at [facility].  His wife and the children do not have any other significant illnesses.           REVIEW OF SYSTEMS:   General: no fever, chills, fatigue, weight changes, or lack of appetite.  Eyes: no epiphora, xerophthalmia,conjunctivitis, pain, glaucoma, blurred vision, blindness, secretion, photophobia, proptosis, diplopia.  Ears: no otorrhea, tinnitus, otorrhagia, deafness, pain, vertigo.  Nose: no rhinorrhea, epistaxis, alteration in perception of odors, sinuses pressure.  Mouth: no alteration in gums or teeth,  ulcers, no difficulty with mastication or deglut ion, no odynophagia.  Neck: no masses or pain, no thyroid alterations, no pain in muscles or arteries, no carotid odynia, no crepitation.  Respiratory: no cough, sputum production, dyspnea, trepopnea, pleuritic pain, hemoptysis.  Heart: no syncope, irregularity, palpitations, angina, orthopnea, paroxysmal nocturnal dyspnea.  Vascular Venous: no tenderness,edema, palpable cords, postphlebitic syndrome, skin changes or ulcerations.  Vascular Arterial: no distal ischemia, claudication, gangrene, neuropathic ischemic pain, skin ulcers, paleness or cyanosis.  GI: no dysphagia, odynophagia, no regurgitation, no heartburn,no indigestion,no nausea,no vomiting,no hematemesis ,no melena,no jaundice,no distention, no obstipation,no enterorrhagia,no proctalgia,no anal  lesions, nochanges in bowel habits.  : no frequency,  "hesitancy, hematuria, discharge, pain.  Musculoskeletal: no muscle or tendon pain or inflammation, joint pain, edema, functional limitation as expected given his bone mets and neuropathic pain,no fasciculations, mass.  Neurologic: no headache, seizures, alterations on Craneal nerves, no motor or senssory deficit, normal coordination, no alteration in memory, orientation, calculation,writting, verbal or written language.  Skin: no rashes, pruritus , no new changes in his left forearm lesion.  Psychiatric: no anxiety, depression, agitation, delusions, proper insight.        VITAL SIGNS:   Vitals:    07/12/17 0737   BP: 90/60   Pulse: 72   Resp: 16   Temp: 97.9 °F (36.6 °C)   Weight: 236 lb (107 kg)   Height: 73\" (185.4 cm)          PHYSICAL EXAMINATION:      GENERAL:  White male in no acute distress, lying on the bed on his side.  The degree of pain intensity was no more than 2 out of 10.     SKIN:  Lesion in the skin in his forearm anteriorly on the left side remains indurated with no pain, no eryth  ema, no local inflammation with normal range-of-motion in the flexion and extension of the left elbow.  He had no edema in his hand.  The area of infiltration of the skin measures close to 2 x 2 cm in size.  The rest of the skin examination reveals mild erythema with occasional papular lesion.       EYES:  Pupils were equal and reactive to light and accommodation.  Extraocular movements were normal.     MOUTH:  Oral mucosals were normal.    NECK:  Disclosed no palpable cervical adenopathies, no thyroid enlargement. CHEST:  Lungs were clear with good breath sounds bilaterally.  No wheezing, crackles, rhonchi or rubs.  His heart was regular with occasional premature contractions.     ABDOMEN:  Soft, nontender, nodular  liver enlargement 2 cm brcm,.  No splenomegaly.  No palpable abdominal masses.  No ascites.  No pain.     MUSCULOSKELETAL:  Disclosed minimal tenderness in the sacrum.  No soft tissue mass, no deformity, no " local increase in the temperature.  No cauda equina syndrome.     EXTREMITIES: Disclosed no tenderness, no edema.  Left upper extremity as already described-improved from previous  NEUROLOGICAL:  Showed a patient that was fully awake, alert, oriented to time and place, able to carry a normal conversation, walking with some degree of difficulty given the intensity of the pain in the sacrum.         LABORATORY DATA:  Component      Latest Ref Rng & Units 5/16/2017 6/13/2017 6/13/2017            1:25 PM  1:25 PM   CA 19-9      0 - 35 U/mL 84 (H) 78 (H) 77 (H)             IMPRESSION:  1. The liver lesions are difficult to measure accurately, however, there  appears to be either no significant change or mild interval decrease in  metastases/recurrence within the medial hepatic segment adjacent to the  treated lesion within the lateral left hepatic lobe. A peripancreatic  node demonstrates interval decrease in size.  2. Stable appearance of the L5 and sacral lytic metastases, additionally  a 5 mm right lower lobe nodule is unchanged.  3. There is a new small left pleural effusion, small pelvic layering  ascites, a small amount of perisplenic fluid. I favor these are likely  reactive and may be related to recent chemotherapy.        ASSESSMENT:        1. This patient has metastatic pancreatic cancer to the sacrum, bone and to the liver undergoing chemotherapy with 5FU and leucovorin on a weekly basis. His clinical situation looks very stable in absence of any modification of the intensity of the pain in the sacrum and no symptoms related to the liver metastasis. No cardiorespiratory symptomatology at all. He has not developed any mucositis, diarrhea, photosensitivity but today the patient has developed complications of his treatment including anemia, leukopenia and thrombocytopenia very likely related to chemotherapy doses were adjusted before and count today are ok for treatment.I discussed with the patient and his wife  the good report of the CT scans of the chest abdomen and pelvis showing no evidence of progressive disease at any level and the instability of his tumor marker CA 19-9 at 77.  Her for I-131 to continue the treatment with the same frequency with the same dosing that he is receiving at this time.  2. Given the anemia, leukopenia and thrombocytopenia we are going to proceed with measurement of B12, folic acid level, ferritin, iron, TIBC.  All these were normal no corrections necessary  4. In regard to his pain medication, he will continue seeing Dr. Shaheed Bertrand for control of this. His dose through the infusional pump has mildly increased recently and the patient otherwise has not had any need for any other modification in the dosing. He is now receiving Meloxicam with some mild further help.   5. 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 This is a chronic issue related to fatty liver infiltration likely a component of cirrhosis associated with previous chemotherapy with FOLFOX. This will be watched in absence of any other intervention.  6. Irritation of the skin and scar tissue formation in the left forearm associated with previous extravasation of oxaliplatin. This area seems to be that it is no longer inflamed mobility and function of the left upper extremity remains normal.     RECOMMENDATIONS:  #1 the patient chemotherapy will remain the same dosing and is scheduled for the time being.  We will plan to repeat a CT scan of the chest abdomen and pelvis in 3 months and we will continue monitoring tumor marker once a month.  Will require a M.D. visit once a month.      2. the patient will continue seeing his pain specialist Dr. Shaheed Bertrand to continue the care through his stated device.    3.  The patient will use topical Zostrix for the neuropathic pain in the right breast talked to see this makes any difference in the long run.    #4.  The patient will remain in his aspirin and vitamin D  supplementation    5.I will initiate low dose Aldactone 25 mg every other day in the morning to cleave ascites related to cirrhosis of the liver and these associated with chemotherapy medicines FOLFOX or specifically.

## 2017-07-24 NOTE — TELEPHONE ENCOUNTER
Wife left voicemail that Dr. Manley did not send in diuretic.  Reviewed Dr. Manley dictation, he was going to send in aldactone 25mg every other day in the AM.  Confirmed with wife and send med to your hometown pharmacy in [place].

## 2017-08-08 NOTE — PROGRESS NOTES
Patient here for CBC with RN review.  He reports he is actually feeling better this week.  He does report he had some chills on Sunday, which occurred after eating some ice cream.  He did not check his temperature at that time.  He denies any other issues.  He denies any signs of bleeding.  Vitals:    08/08/17 1247   BP: 99/63   Pulse: 81   Temp: 98.1 °F (36.7 °C)     Lab Results   Component Value Date    WBC 3.21 (L) 08/08/2017    HGB 9.4 (L) 08/08/2017    HCT 28.2 (L) 08/08/2017    .6 (H) 08/08/2017    PLT 61 (L) 08/08/2017     Patient will return in one week for reevaluation and possibly to resume his treatment.  He is to return sooner should he develop any problems or concerns.    AMEENA Grove

## 2017-08-10 NOTE — TELEPHONE ENCOUNTER
Message sent to pt's pharmacy stating Dr. Manley does not prescribe pravastatin    ----- Message from Fran Manley MD sent at 8/10/2017  2:50 PM EDT -----  no  ----- Message -----     From: Ingris Hernandez RN     Sent: 8/10/2017   2:02 PM       To: Fran Manley MD    Received fax refill request for pt's pravastatin. Do we prescribe this for him? If so I will fill it. Thanks Ingris
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
## 2017-08-16 NOTE — DISCHARGE INSTRUCTIONS
EDUCATION /DISCHARGE INSTRUCTIONS  Paracentesis:  A needle is inserted into the space between your abdominal organs and the membrane that surrounds them (peritoneal space).  It is done for the diagnosis and treatment of fluid that is resistant to other therapies.  It helps determine the cause of the fluid and at the relieves pressure created by the fluid.  A sample is obtained and sent to the laboratory for study.    During the procedure:  You will lie on a bed on your back with your legs drawn up.  Your abdomen will be exposed from the chest to the pelvis.  You will otherwise be covered to maintain comfort.  A physician will clean your abdomen with antiseptic soap, place a sterile towel around the site and administer a local anesthetic to numb the area.  The physician will insert a needle into your abdominal wall.  There may be a popping sound which signifies the needle has pierced the abdominal wall. Next, the physician will attach tubing to transfer a sample into a collection bottle.  After the fluid is obtained the needle will be removed.  A pressure dressing is applied to the site.    Risks of the procedure include but are not limited to:   *  Bleeding    *  Wound infection   *  Low blood pressure   *  Decreased urination   *  Low sodium if a large amount of fluid is removed   *  Puncture of abdominal organs by the needle    Benefits of the procedure:  Benefits include the removal of fluid from the abdomen, relief of abdominal pressure and facilitation of a diagnosis.    Alternatives to the procedure:  Possible alternatives are diuretic drug therapy or surgery to place a shunt to drain fluid.  Risks of diuretic drug therapy include possible dehydration and renal failure.  The benefit of drug therapy is that it can be done at home under physician supervision.  Risks of shunt placement include exposure to anesthesia, infection, excessive bleeding and injury to abdominal organs.  The benefit of a shunt is that it  can be used to drain fluid over a longer period of time.  THIS EDUCATION INFORMATION WAS REVIEWED PRIOR TO THE PROCEDURE AND CONSENT. Patient initials__________________Time_________________    Post procedure:    *  Weigh yourself daily.   *  Follow your doctors dietary instructions.   *  Rest today (no pushing pulling, straining or heavy lifting).   *  Slowly increase activity tomorow.   *  If you received sedation do not drive for 24 hours.              * Skin affix applied to puncture site. Do not try to remove, scratch or apply lotion   * Skin affix will fall off on it's own   *  You may shower tomorrow    Call your doctor if experiencing:   *  Signs of infection such as redness, swelling, excessive pain and / or foul       smelling drainage from the puncture site.   *  Chills or fever over 101 degrees (by mouth).   *  Fainting.   *  Rapid weight gain / loss.   *  Unrelieved pain.   *  Any new or severe symptoms.    Following the procedure:      Follow-up with the ordering physician as directed.   Continue to take other medications as directed by your physician unless    otherwise instructed.   If applicable, resume taking your blood thinners or Aspirin in 24 hours.    If you have any concerns please call the Radiology Nurses Desk at [phone].  You are the most important factor in your recovery.  Follow the above instructions carefully.
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
## 2017-08-16 NOTE — NURSING NOTE
To home via wheelchair with relative, no distress.  Reviewed post procedure care earlier with relative and pt, voiced understanding.  No IV site.

## 2017-08-16 NOTE — NURSING NOTE
Given verbal and written instructions regarding post-procedure care to pt and wife, voiced understanding.

## 2017-08-22 NOTE — PROGRESS NOTES
PT'S TREATMENT BEING CHANGED AND NOT ABLE TO GET APPROVAL TODAY. PER DR. WADE PT TO GO ON HOME AND AWAIT INS APPROVAL. TOLD PT'S WIFE TO CALL HERE FRI IF THEY HAVEN'T HEARD ANYTHING. SHE V/U.

## 2017-08-22 NOTE — PROGRESS NOTES
REASONS FOR FOLLOWUP:    Metastatic pancreatic cancer to liver and sacrum.  The patient has undergone successful plan of radiation treatment to the sacrum on 2 different occasions and stereotactic treatment for a liver metastasis, solitary.  The patient also has undergone therapy with Eloxatin/Xeloda successfully.Continuation of weekly 5fu and leucovorin as per 5/16/17    HISTORY OF PRESENT ILLNESS:   This patient is here today in company of his wife, stating that he continues doing relatively well from the point of view of his pancreatic cancer with liver and spinal metastasis. The patient has been receiving 5FU and leucovorin on a weekly basis trying to stay away from eloxatin that was a great medicine for him in the past but triggered very significant peripheral neuropathy in his feet. The patient has developed during the last few weeks some sensation of fullness in the abdomen. An ultrasound and CT scans were done documenting ascites and we have performed a paracentesis showing fluid positive for malignant cells. Obviously this raises the question if 5FU and leucovorin by itself is controlling his disease and the answer is no. These medicines will be discontinued at this time and we will move into liposomal CPT-11 or even consideration this has been done today. We have sent analysis from previous tissue for MSI analysis. This will be further discussed below.     The patient continues having a good appetite, no nausea, no vomiting, occasional loose bowel activity, 1 stool a day, with no passage of blood in the stools.  No abdominal pain.  Minimal sensation of fullness.  Normal urination since he was started on diuretic, Aldactone.  No swelling in his lower extremities. Performance status has not changed, his ability to walk and get around remains about the same and his pain is under good control with infusional pump, managed by Dr. Shaheed Bertrand, Pain Specialist.  He has not had any cough, sputum  production, or shortness of breath.  He has not had any fevers or chills.                  PAST MEDICAL HISTORY:     1.  Morbid obesity.     2. History of hypertension.     3. Hyperlipidemia.     4. He has no history of diabetes, neither history of thyroid disease, coronary disease, and cardiac arrhythmia.    5.   He had a syncopal episode in December 2014.  Conclusion was that the patient had an episode of low blood pressure and these medicines were adjusted accordingly.     6. He has no history of colonoscopy in the past.     7. History of enlarged prostate with a normal PSA.     8. 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     9.   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  His diplopia improved spontaneously.     10. He has no history of previous surgeries.         HEMATOLOGIC/ONCOLOGIC HISTORY: History from previous dates can be found in the separate document.         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  We adjusted down the E  loxatin to 170 mg total, given his minor leukopenia and minor thrombocytopenia.          His pain medication will remain the same.  His doxepin will remain the same.  In regard to extravasation   of Eloxatin in the left upper extremity, local therapy will remain the same.      The patient is next seen in the office March 07, 2016 stable for all aspects.  We plan to continue chemotherapy at the same doses at this time and scan him in the next several months and follow-up      ALLERGIES:  No known drug allergies.         MEDICATIONS:  The current medication list was reviewed with the patient and updated in the EMR this date per the medical assistant.  Medication dosages and frequencies were  confirmed to be accurate.     SOCIAL   HISTORY: The patient used to work for [employer] for a long time, most of his life.  He retired many years ago.  He stays at home busy doing many activities, including fishing, mowing, gardening, and many other chores.  He was exposed to chemicals w  h  en he worked at [employer], but he used to have protective equipment. He is not aware of any other coworkers who have been exposed to anything, or diagnosed with cancer.  He has 3 stepchildren in good health.  He used to smoke for 20 years; he quit   30 years ago.  He used to smoke a pack of cigarettes a day.  He does not drink any alcohol.         FAMILY HISTORY:  His mother  as a consequence of primary liver cancer and she was diagnosed in .  He had a sister who had multiple myeloma and received care at [facility].  His wife and the children do not have any other significant illnesses.           REVIEW OF SYSTEMS:   General: no fever, chills, fatigue, weight changes, or lack of appetite.  Eyes: no epiphora, xerophthalmia,conjunctivitis, pain, glaucoma, blurred vision, blindness, secretion, photophobia, proptosis, diplopia.  Ears: no otorrhea, tinnitus, otorrhagia, deafness, pain, vertigo.  Nose: no rhinorrhea, epistaxis, alteration in perception of odors, sinuses pressure.  Mouth: no alteration in gums or teeth,  ulcers, no difficulty with mastication or deglut ion, no odynophagia.  Neck: no masses or pain, no thyroid alterations, no pain in muscles or arteries, no carotid odynia, no crepitation.  Respiratory: no cough, sputum production, dyspnea, trepopnea, pleuritic pain, hemoptysis.  Heart: no syncope, irregularity, palpitations, angina, orthopnea, paroxysmal nocturnal dyspnea.  Vascular Venous: no tenderness,edema, palpable cords, postphlebitic syndrome, skin changes or ulcerations.  Vascular Arterial: no distal ischemia, claudication, gangrene, neuropathic ischemic pain, skin ulcers, paleness or  "cyanosis.  GI: no dysphagia, odynophagia, no regurgitation, no heartburn,no indigestion,no nausea,no vomiting,no hematemesis ,no melena,no jaundice,STAED distention, no obstipation,no enterorrhagia,no proctalgia,no anal  lesions, nochanges in bowel habits.  : no frequency, hesitancy, hematuria, discharge, pain.  Musculoskeletal: no muscle or tendon pain or inflammation, joint pain, edema, functional limitation as expected given his bone mets and neuropathic pain,no fasciculations, mass.  Neurologic: no headache, seizures, alterations on Craneal nerves, no motor or senssory deficit, normal coordination, no alteration in memory, orientation, calculation,writting, verbal or written language.  Skin: no rashes, pruritus , no new changes in his left forearm lesion.  Psychiatric: no anxiety, depression, agitation, delusions, proper insight.        VITAL SIGNS:   Vitals:    08/22/17 0900   BP: 114/69   Pulse: 86   Resp: 16   Temp: 97.6 °F (36.4 °C)   SpO2: 94%   Weight: 236 lb (107 kg)   Height: 73\" (185.4 cm)          PHYSICAL EXAMINATION:      GENERAL:  White male in no acute distress, lying on the bed on his side.  The degree of pain intensity was no more than 2 out of 10.     SKIN:  Lesion in the skin in his forearm anteriorly on the left side remains indurated with no pain, no eryth  ema, no local inflammation with normal range-of-motion in the flexion and extension of the left elbow.  He had no edema in his hand.  The area of infiltration of the skin measures close to 2 x 2 cm in size.  The rest of the skin examination reveals mild erythema with occasional papular lesion.       EYES:  Pupils were equal and reactive to light and accommodation.  Extraocular movements were normal.     MOUTH:  Oral mucosals were normal.    NECK:  Disclosed no palpable cervical adenopathies, no thyroid enlargement. CHEST:  Lungs were clear with good breath sounds bilaterally.  No wheezing, crackles, rhonchi or rubs.  His heart was " regular with occasional premature contractions.     ABDOMEN:  Soft, nontender, nodular  liver enlargement 2 cm brcm,.  No splenomegaly.  No palpable abdominal masses.  POSITIVE ascites.  No pain.     MUSCULOSKELETAL:  Disclosed minimal tenderness in the sacrum.  No soft tissue mass, no deformity, no local increase in the temperature.  No cauda equina syndrome.     EXTREMITIES: Disclosed no tenderness, no edema.  Left upper extremity as already described-improved from previous  NEUROLOGICAL:  Showed a patient that was fully awake, alert, oriented to time and place, able to carry a normal conversation, walking with some degree of difficulty given the intensity of the pain in the sacrum.         LABORATORY DATA:  Final Diagnosis   ASCITES FLUID (THIN PREP):                         POSITIVE FOR RARE MALIGNANT CELLS (SEE COMMENT).                         NUMEROUS MESOTHELIAL CELLS, MACROPHAGES, AND LYMPHOCYTES.     COMMENT: There is no cell block available for additional immunohistochemical characterization of the malignant cells.  Correlation with clinical and radiologic findings is recommended.     RAE/greg IHC/a/THM              Component      Latest Ref Rng & Units 6/13/2017 8/15/2017           1:25 PM 12:55 PM   CA 19-9      <=35.0 U/mL 77 (H) 134.8 (H)     US ABDOMEN COMPLETE-      CLINICAL: Follow-up metastatic pancreatic carcinoma, abdominal  distention      COMPARISON CT 07/06/2017      FINDINGS: There is splenic enlargement, mild at 14 cm in greatest  length. The right kidney is 10.5 cm in length, the left kidney is 11.4  cm in length. The kidneys are within normal limits.      Caliber and contour of the abdominal aorta within normal limits, no  aneurysm. Visualized inferior vena cava is within normal limits.      There is free intraperitoneal fluid demonstrated, moderate amount in the  right upper quadrant. No biliary duct dilatation the CBD is 5 mm in  diameter. No gallstones or wall thickening  identified.      Gas within the overlying stomach obscures visualization of the pancreas.      The liver is small in size for the patient's given body habitus similar  to the CT examination. Portions of the liver were difficult to penetrate  and visualize. The liver lesions demonstrated on the previous CT are  only vaguely suggested on the current ultrasound. I feel that this is  likely in part related to their position, body habitus and technical  factors. These areas of the liver not seen well enough on  ultrasound to  comment on interval change. The remainder of the examination is  unremarkable.      This report was finalized on 8/17/2017 12:50 PM by Dr. Deandre Pepe MD.    IMPRESSION:  1. The liver lesions are difficult to measure accurately, however, there  appears to be either no significant change or mild interval decrease in  metastases/recurrence within the medial hepatic segment adjacent to the  treated lesion within the lateral left hepatic lobe. A peripancreatic  node demonstrates interval decrease in size.  2. Stable appearance of the L5 and sacral lytic metastases, additionally  a 5 mm right lower lobe nodule is unchanged.  3. There is a new small left pleural effusion, small pelvic layering  ascites, a small amount of perisplenic fluid. I favor these are likely  reactive and may be related to recent chemotherapy.        ASSESSMENT:        1. This patient has metastatic pancreatic cancer to the sacrum, bone and to the liver undergoing chemotherapy with 5FU and leucovorin on a weekly basis.For the last 2 weeks the patient developed abdominal distention, we documented ascites and now the cytology of the fluid, as stated above, documents malignant cells, meaning progression. On the other hand, his primary liver metastases and the sacral metastases remain about the same, recently documented.  The patient’s tumor marker raised, stated above, from the 70 category to the 167 category.  Therefore, it is  obvious that the patient biochemically and clinically has obvious progressive disease and for this reason we are going to go ahead and discontinue 5FU and leucovorin now.      In regard to pain control, the patient continues doing very well and he will continue following up with Dr. Shaheed Bertrand in this regard.      RECOMMENDATIONS:    1.  I have advised the patient to discontinue the 5FU and leucovorin altogether.   2.  We will proceed with approval of liposomal CPT-11 to be given to him every 2 or 3 weeks.  He is aware that this medicine can produce diarrhea and he is going to be very careful with his laxative use.  I also made him aware that diarrhea could become problem and we need to know if he is having more than 3 or 4 stools a day and he will need to be very pro-aggressive in regard to Imodium use if that becomes a real issue.  Because he is taking a diuretic, also he has to be careful in regard to diuresis and diarrhea because that can produce issues in regard to more dehydration and blood pressures problems, dropping the blood pressure.  3.  If the CPT-11 liposomal is approved he will remain on this medicine at least for the next 2-3 months, awaiting to review how he does on clinical grounds.   4.  We will continue monitoring his tumor markers and see how they progress and evolve.    5.  Also I have in mind to proceed with testing of the original tumor for MSI.  Recent information favors PDL-1 inhibitors in patients who are MSI positive independent of the location of the original tumor or the origin of the original tumor.  The [employer] analysis that we have on hand on him, this was not found, neither never performed, and I am going to go ahead and order this test at this point.  6.  We discussed all these facts with the patient and wife and they are ready to proceed.      I further discussed with the nurses in charge of approving liposomal CPT-11 on this patient. The patient already will be taking his  third or fourth line of treatment with this and they need to review medical rules and recommendations in this regard. To me it will be a straightforward medication using the liposomal medicine alone because 5FU and leucovorin already documented lack of benefit. The patient has already had FOLFOX in the past achieving a good response but developing very significant toxicity including peripheral neuropathy grade 2. Therefore I do not need to cross the line on this and again produce more neuropathy in somebody who already has significant functional limitations given the pain in the sacral area.     We again persuaded pathology to undergo further assessment of the original tissue in March 2015 for MSI. This could take a week or 10 days. If it turns out to be positive he will be a candidate to receive Keytruda for example and this will be given to him every 3 weeks.    We will get to review all of this information in the next 48 hours.

## 2017-08-23 NOTE — PROGRESS NOTES
Per verbal request from Delaney, pharmacist had OLIVIA Fletcher precert Emend and verbal order from Dr wang to add Emend to treatment plan.

## 2017-08-24 NOTE — PROGRESS NOTES
Subjective     PATIENT NAME:  Bk Guerra  YOB: 1950  PATIENTS AGE:  66 y.o.  PATIENTS SEX:  male  DATE OF SERVICE:  08/24/2017  PROVIDER:  AMEENA Kirby      ____________________PATIENT EDUCATION____________________    PATIENT EDUCATION:  Today I met with the patient to discuss the chemotherapy regimen recommended for treatment of his disease.  The patient was given explanation of treatment premed side effects including office policy that prohibits patients to drive if sedating medications are administered, MD explanation given regarding benefits, side effects, toxicities and goals of treatment.  The patient received a Chemotherapy/Biotherapy Plan Summary including diagnosis and specific treatment plan.    SIDE EFFECTS:  Common side effects were discussed with the patient  And his wife. Discussion included hair loss/discoloration, anemia/fatigue, infection/chills/fever, appetite, bleeding risk/precautions, constipation, diarrhea, mouth sores, taste alteration, loss of appetite,nausea/vomiting, peripheral neuropathy, skin/nail changes, rash, muscle aches/weakness, photosensitivity, weight gain/loss, hearing loss, dizziness, menopausal symptoms, menstrrual irregularity, sterility, high blood pressure, heart damage, liver damage, lung damage, kidney damage, DVT/PE risk, fluid retention, pleural/pericardial effusion, somnolence, electrolyte/LFT imbalance, vein exercises and/or the possible need for vascular access/port placement.  The patient was advice that although uncommon, leakage of an infused medication from the vein or venous access device (port) may lead to skin breakdown and/or other tissue damage.  The patient was advised that he/she may have pain, bleeding, and/or bruising from the insertion of a needle in their vein or venous access device (port).  The patient was further advised that, in spite of proper technique, infection with redness and irritation may rarely occur at the  site where the needle was inserted.  The patient was advised that if complications occur, additional medical treatment is available.    Discussion also included side effects specific to drugs in the treatment plan, Liposomal Irinotecan specifically.  .    A total of 30 minutes were spent with the patient, with 100% of time spent in education and counseling.

## 2017-08-25 NOTE — TELEPHONE ENCOUNTER
Received VM from pharmacy stating pt was waiting on a nausea med. Spoke with Lanie Hopper to send zofran prescription. zofran escribed

## 2017-09-06 PROBLEM — K52.1 CHEMOTHERAPY INDUCED DIARRHEA: Status: ACTIVE | Noted: 2017-01-01

## 2017-09-06 PROBLEM — T45.1X5A CHEMOTHERAPY INDUCED DIARRHEA: Status: ACTIVE | Noted: 2017-01-01

## 2017-09-06 NOTE — PROGRESS NOTES
REASONS FOR FOLLOWUP:    Metastatic pancreatic cancer to liver and sacrum.  The patient has undergone successful plan of radiation treatment to the sacrum on 2 different occasions and stereotactic treatment for a liver metastasis, solitary.  The patient also has undergone therapy with Eloxatin/Xeloda successfully.Continuation of weekly 5fu and leucovorin as per 5/16/17.    Initiated Liposomal Irinotecan on 8/24/2017.    HISTORY OF PRESENT ILLNESS:    Mr. Guerra is here today for his scheduled cycle #2 Liposomal Irinotecan. He unfortunately has not tolerated his first cycle well. He reports persistent, constant diarrhea. He states that he almost immediately has a watery runny bowel movement whenever he eats. He has been trying to drink fluids throughout the day to accommodate for fluid loss and this is reflected by a normal creatinine and BUN. His magensium is within normal limits too. He has been taking Immodium which has provided him with minimal relief.    His nausea is well controlled and does not seem to be much of an issue. He denies fevers, chills, shortness of breath or new pain.            PAST MEDICAL HISTORY:     1.  Morbid obesity.     2. History of hypertension.     3. Hyperlipidemia.     4. He has no history of diabetes, neither history of thyroid disease, coronary disease, and cardiac arrhythmia.    5.   He had a syncopal episode in December 2014.  Conclusion was that the patient had an episode of low blood pressure and these medicines were adjusted accordingly.     6. He has no history of colonoscopy in the past.     7. History of enlarged prostate with a normal PSA.     8. 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     9.   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  His diplopia improved spontaneously.     10. He has no history of  previous surgeries.         HEMATOLOGIC/ONCOLOGIC HISTORY: History from previous dates can be found in the separate document.         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  We adjusted down the E  loxatin to 170 mg total, given his minor leukopenia and minor thrombocytopenia.          His pain medication will remain the same.  His doxepin will remain the same.  In regard to extravasation   of Eloxatin in the left upper extremity, local therapy will remain the same.      The patient is next seen in the office 2016 stable for all aspects.  We plan to continue chemotherapy at the same doses at this time and scan him in the next several months and follow-up      ALLERGIES:  No known drug allergies.         MEDICATIONS:  The current medication list was reviewed with the patient and updated in the EMR this date per the medical assistant.  Medication dosages and frequencies were confirmed to be accurate.     SOCIAL   HISTORY: The patient used to work for [employer] for a long time, most of his life.  He retired many years ago.  He stays at home busy doing many activities, including fishing, mowing, gardening, and many other chores.  He was exposed to chemicals w  h  en he worked at [employer], but he used to have protective equipment. He is not aware of any other coworkers who have been exposed to anything, or diagnosed with cancer.  He has 3 stepchildren in good health.  He used to smoke for 20 years; he quit   30 years ago.  He used to smoke a pack of cigarettes a day.  He does not drink any alcohol.         FAMILY HISTORY:  His mother  as a consequence of primary liver cancer and she was diagnosed in .  He had a sister who had multiple myeloma and received care at [facility].  His wife and the children do not have any other significant illnesses.      "      REVIEW OF SYSTEMS:   General: no fever, chills, fatigue, weight changes, or lack of appetite.  Respiratory: No shortness of breath or cough   Heart: no syncope, irregularity, palpitations, angina, orthopnea, paroxysmal nocturnal dyspnea.  Vascular Venous: no tenderness,edema,  GI: See HPI.  : no frequency, hesitancy, hematuria, discharge, pain.  Musculoskeletal: Pain associated with spinal metastasis requiring side lying position  Neuro: See Musculoskeletal  Skin: no rashes noted       VITAL SIGNS:   Vitals:    09/06/17 1047   BP: 94/65   Pulse: 92   Resp: 18   Temp: 98.3 °F (36.8 °C)   TempSrc: Oral   SpO2: 94%   Weight: 223 lb (101 kg)   Height: 73\" (185.4 cm)          PHYSICAL EXAMINATION:      GENERAL:  White male in no acute distress, lying on the bed on his side.     SKIN: No rash noted. Warm dry. Pink.     MOUTH:  Oral mucosals were normal.    CHEST:  Lungs were clear with good breath sounds bilaterally.  No wheezing, crackles, rhonchi or rubs.   CARDIAC: Regular rate.   ABDOMEN:  Soft, nontender,    MUSCULOSKELETAL:  Disclosed minimal tenderness in the sacrum.    EXTREMITIES: Disclosed no tenderness, no edema.  Left upper extremity as already described-improved from previous  NEUROLOGICAL:  Showed a patient that was fully awake, alert, oriented to time and place.                     I        ASSESSMENT/PLAN:        1. This patient has metastatic pancreatic cancer to the sacrum, bone and to the liver. He started Liposomal Irinotecan on 8/24/2017 and has experienced significant diarrhea since. Although his labs are within normal limits, I am very hesitant to proceed with another cycle until we have managed his diarrhea. He has a history of skin breakdown as a result of immobility related to his sacral metastasis. This could of course be exacerbated should he experience further profuse diarrhea.      I have called in a prescription of Lomotil today and reviewed proper scheduling and dosing. He will " return next week in hopes of resuming treatment. We will provide 1000cc's normal saline in the office today.    I have reviewed signs and symptoms for which he needs to call the office.

## 2017-09-06 NOTE — PROGRESS NOTES
Treatment held today.  Patient having excessive diarrhea.  Per Cecilia patient will return in one week for treatment if diarrhea is improved. Weight loss of 12 pounds in 2 weeks.  Patient and wife YUMIKO

## 2017-09-12 NOTE — PROGRESS NOTES
PT PRESENTS TODAY FEELING BAD. WIFE STATES THAT SINCE PT HAD HIS LAST TREATMENT 3 WKS AGO PT HAS HAD SEVERE DIARRHEA, 20 LB WT LOSS AND HAS NOT BEEN EATING MUCH AT ALL. PT IS NOW VERY WEAK. TREATMENT WAS HELD LAST WK. STAT CMP AND MG DRAWN. DR. WADE NOTIFIED OF PT'S STATUS. REC'D THE FOLLOWING ORDERS PER DR. WADE...1500CC NS TODAY W/ 20 MEQ KCL, SANDOSTATIN 100 MCG SQ AND NO CHEMO TREATMENT TODAY. PT TO THEN RTN THIS WED, THURS AND FRI FOR 1 L NS AND SANDOSTATIN 100 MCG SQ. STAT BMP EA DAY AS WELL. PJT RN AND DC RN NOTIFIED FOR APPROVAL AND CAREPLAN ORDERS. PT ALSO INSTRUCTED TO CONTINUE TO TAKE LOMOTIL UP TO 8/DAY. PT WILL ALSO CONTINUE TO DRINK FLUIDS AT HOME. PROB NEED TO ACCESS PT EA DAY FOR NEED TO RTN THE FOLLOWING DAY AS IT IS VERY DIFFICULT FOR THE PT TO GET HERE IN THE CAR D/T HIS PAIN. PT AND WIFE V/U.

## 2017-09-13 PROBLEM — E86.0 DEHYDRATION: Status: ACTIVE | Noted: 2017-01-01

## 2017-09-13 NOTE — PROGRESS NOTES
Per MITRA Meeks RN note from 9/12/17 verbal order from Dr Manley for patient to receive Normal Saline 1 liter IV and Sandostatin 100 mcg SQ on 9/13, 9/14 and 9/15.

## 2017-09-15 NOTE — PROGRESS NOTES
PT REPORTS THAT HE HAD 2 EPISODES OF DIARRHEA YESTERDAY AND TODAY NONE. PT DENIES STOMACH CRAMPS. PT STATES THAT HE HAS EATEN BETTER PAST COUPLE DAYS. PT IS DRINKING FLUIDS. PT STATES THAT HE IS FEELING BETTER THAN HE DID AT THE BEGINNING OF THE WK. DISCUSSED ALL W/ SW NP. ORDERS TO GIVE 1 L NS AND SANDOSTATIN TODAY AND NO LABS NEEDED. PT TO CALL OVER THE WKEND W/ ANY SIG CHANGES.

## 2017-09-19 PROBLEM — E86.0 DEHYDRATION: Status: RESOLVED | Noted: 2017-01-01 | Resolved: 2017-01-01

## 2017-09-19 NOTE — PROGRESS NOTES
REASONS FOR FOLLOWUP:    Metastatic pancreatic cancer to liver and sacrum.  The patient has undergone successful plan of radiation treatment to the sacrum on 2 different occasions and stereotactic treatment for a liver metastasis, solitary.  The patient also has undergone therapy with Eloxatin/Xeloda successfully.Continuation of weekly 5fu and leucovorin as per 5/16/17    HISTORY OF PRESENT ILLNESS:   This patient is here today in the company of his wife in order to be reviewed and decide how to proceed in regard to further therapy of his pancreatic cancer with liver, peritoneal and bone metastasis. The patient was given 5FU, leucovorin and liposomal CPT-11 almost 3 weeks ago. He developed after that profound prostration and profuse diarrhea to the point  that he required hydration every day last week in this office. At that point Imodium and Lomotil were not working and the patient was advised to proceed with Sandostatin subcutaneous injection every day that finally made the difference having no further diarrhea since Sunday. The patient now has started to eat and he feels substantially better because his appetite is returning and his strength is improving because his volume status has normalized. He has normal urination. He has not seen any modification in the amount of pain that he is experiencing in his lumbar area. He continues adjusting the pain medicine with his remote control in the implanted pump. He has no cough, no sputum production. He has not had any fever or chills. He has no other cancer related pain and he does not feel that his abdomen is distended like it used to be before.     Given the above events liposomal CPT-11 has been discontinued along with 5FU and leucovorin. 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 This medicine was discontinued because of neuropathy not because of the lack of efficacy, therefore we are going to back onto this regimen  monitoring the neuropathy very close. He has Xeloda at home that he has leftover from the previous dose and he will take the Xeloda the week that he receives the Eloxatine. Again plan to initiate this again in 3 weeks and every 2 weeks depending on the situation.                  PAST MEDICAL HISTORY:     1.  Morbid obesity.     2. History of hypertension.     3. Hyperlipidemia.     4. He has no history of diabetes, neither history of thyroid disease, coronary disease, and cardiac arrhythmia.    5.   He had a syncopal episode in December 2014.  Conclusion was that the patient had an episode of low blood pressure and these medicines were adjusted accordingly.     6. He has no history of colonoscopy in the past.     7. History of enlarged prostate with a normal PSA.     8. 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     9.   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  His diplopia improved spontaneously.     10. He has no history of previous surgeries.         HEMATOLOGIC/ONCOLOGIC HISTORY: History from previous dates can be found in the separate document.         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  We adjusted down the E  loxatin to 170 mg total, given his minor leukopenia and minor thrombocytopenia.          His pain medication will remain the same.  His doxepin will remain the same.  In regard to extravasation   of Eloxatin in the left upper extremity, local therapy will remain the same.      The patient is next seen in the office March 07, 2016 stable for all aspects.  We plan to continue chemotherapy at the same doses at this time and scan him in the next several months and follow-up      ALLERGIES:  No known drug  allergies.         MEDICATIONS:  The current medication list was reviewed with the patient and updated in the EMR this date per the medical assistant.  Medication dosages and frequencies were confirmed to be accurate.     SOCIAL   HISTORY: The patient used to work for [employer] for a long time, most of his life.  He retired many years ago.  He stays at home busy doing many activities, including fishing, mowing, gardening, and many other chores.  He was exposed to chemicals w  h  en he worked at [employer], but he used to have protective equipment. He is not aware of any other coworkers who have been exposed to anything, or diagnosed with cancer.  He has 3 stepchildren in good health.  He used to smoke for 20 years; he quit   30 years ago.  He used to smoke a pack of cigarettes a day.  He does not drink any alcohol.         FAMILY HISTORY:  His mother  as a consequence of primary liver cancer and she was diagnosed in .  He had a sister who had multiple myeloma and received care at [facility].  His wife and the children do not have any other significant illnesses.           REVIEW OF SYSTEMS:   General: no fever, chills, profound fatigue, and weight changes, and lack of appetite.  Eyes: no epiphora, xerophthalmia,conjunctivitis, pain, glaucoma, blurred vision, blindness, secretion, photophobia, proptosis, diplopia.  Ears: no otorrhea, tinnitus, otorrhagia, deafness, pain, vertigo.  Nose: no rhinorrhea, epistaxis, alteration in perception of odors, sinuses pressure.  Mouth: no alteration in gums or teeth,  ulcers, no difficulty with mastication or deglut ion, no odynophagia.  Neck: no masses or pain, no thyroid alterations, no pain in muscles or arteries, no carotid odynia, no crepitation.  Respiratory: no cough, sputum production, dyspnea, trepopnea, pleuritic pain, hemoptysis.  Heart: no syncope, irregularity, palpitations, angina, orthopnea, paroxysmal nocturnal dyspnea.  Vascular Venous: no  "tenderness,edema, palpable cords, postphlebitic syndrome, skin changes or ulcerations.  Vascular Arterial: no distal ischemia, claudication, gangrene, neuropathic ischemic pain, skin ulcers, paleness or cyanosis.  GI: no dysphagia, odynophagia, no regurgitation, no heartburn,no indigestion,no nausea,no vomiting,no hematemesis ,no melena,no jaundice,STAED distention, no obstipation,no enterorrhagia,no proctalgia,no anal  lesions, stated changes in bowel habits.  : no frequency, hesitancy, hematuria, discharge, pain.  Musculoskeletal: no muscle or tendon pain or inflammation, joint pain, edema, functional limitation as expected given his bone mets and neuropathic pain,no fasciculations, mass.  Neurologic: no headache, seizures, alterations on Craneal nerves, no motor or senssory deficit, normal coordination, no alteration in memory, orientation, calculation,writting, verbal or written language.  Skin: no rashes, pruritus , no new changes in his left forearm lesion.  Psychiatric: no anxiety, depression, agitation, delusions, proper insight.        VITAL SIGNS:   Vitals:    09/19/17 1232   BP: 97/65   Pulse: 88   Resp: 16   Temp: 97.5 °F (36.4 °C)   TempSrc: Oral   SpO2: 97%   Height: 73\" (185.4 cm)          PHYSICAL EXAMINATION:      GENERAL:  White male in no acute distress, lying on the bed on his side.  The degree of pain intensity was no more than 2 out of 10.     SKIN:  Lesion in the skin in his forearm anteriorly on the left side remains indurated with no pain, no eryth  ema, no local inflammation with normal range-of-motion in the flexion and extension of the left elbow.  He had no edema in his hand.  The area of infiltration of the skin measures close to 2 x 2 cm in size.  The rest of the skin examination reveals mild erythema with occasional papular lesion.       EYES:  Pupils were equal and reactive to light and accommodation.  Extraocular movements were normal.     MOUTH:  Oral mucosals were normal.  "   NECK:  Disclosed no palpable cervical adenopathies, no thyroid enlargement. CHEST:  Lungs were clear with good breath sounds bilaterally.  No wheezing, crackles, rhonchi or rubs.  His heart was regular with occasional premature contractions.     ABDOMEN:  Soft, nontender, nodular  liver enlargement 1 cm brcm,.  No splenomegaly.  No palpable abdominal masses.  negative ascites.  No pain.     MUSCULOSKELETAL:  Disclosed minimal tenderness in the sacrum.  No soft tissue mass, no deformity, no local increase in the temperature.  No cauda equina syndrome.     EXTREMITIES: Disclosed no tenderness, no edema.  Left upper extremity as already described-improved from previous  NEUROLOGICAL:  Showed a patient that was fully awake, alert, oriented to time and place, able to carry a normal conversation, walking with some degree of difficulty given the intensity of the pain in the sacrum.         LABORATORY DATA:Differential   Order: [id number] - Part of Panel Order [id number]   Status:  Final result   Visible to patient:  No (Not Released) Dx:  Pancreas carcinoma      Ref Range & Units 12:30 PM     WBC 4.50 - 10.70 10*3/mm3 12.71 (H)   RBC 4.60 - 6.00 10*6/mm3 3.33 (L)   Hemoglobin 13.7 - 17.6 g/dL 11.7 (L)   Hematocrit 40.4 - 52.2 % 34.6 (L)   MCV 79.8 - 96.2 fL 103.9 (H)   MCH 27.0 - 32.7 pg 35.1 (H)   MCHC 32.6 - 36.4 g/dL 33.8   RDW 11.5 - 14.5 % 17.1 (H)   RDW-SD 37.0 - 54.0 fl 65.8 (H)   MPV 6.0 - 12.0 fL 12.4 (H)   Platelets 140 - 500 10*3/mm3 215   Neutrophil % 42.7 - 76.0 % 85.3 (H)   Lymphocyte % 19.6 - 45.3 % 6.2 (L)   Monocyte % 5.0 - 12.0 % 5.8   Eosinophil % 0.3 - 6.2 % 1.5   Basophil % 0.0 - 1.5 % 0.6   Immature Grans % 0.0 - 0.5 % 0.6 (H)   Neutrophils, Absolute 1.90 - 8.10 10*3/mm3 10.84 (H)             ASSESSMENT:        1. This patient has metastatic pancreatic cancer to the sacrum, bone and to the liver undergoing chemotherapy with 5FU and leucovorin on a weekly basis.For the last 2 weeks the patient  developed abdominal distention, we documented ascites and now the cytology of the fluid, as stated above, documents malignant cells, meaning progression. On the other hand, his primary liver metastases and the sacral metastases remain about the same, recently documented.  The patient’s tumor marker raised, stated above, from the 70 category to the 167 category.    In regard to pain control, the patient continues doing very well and he will continue following up with Dr. Shaheed Bertrand in this regard.      RECOMMENDATIONS:    1.Given the tremendous amount of side effects of the liposomal CPT-11 with profuse diarrhea lasting for 2 weeks making him to lose close to 15 pounds of weight and making him dehydrated with profound prostration that finally improved after Sandostatin was given to him on a daily basis for 4 days in a row we advised the patient once and for all discontinue the CPT-11. I find no reason for dose adjustment on this medication under these circumstances.     I find no reason for testing to see if the patient has alterations in the metabolism of CPT-11. No matter what I am not going to give him this medicine again.     The patient still has a grade 1 peripheral neuropathy from Eloxatine. This medicine was discontinued when he was receiving this along with Xeloda because of peripheral neuropathy. The degree of functional limitations that he has from neuropathy is minimal and I pointed out to him that that is probably the only option left in regard to therapy going back to that regimen. Because of those circumstances the patient has agreed to go back onto Oxaliplatin at a dose of 170 mg total dose every 2 weeks along with Xeloda that he will take at the previous dose and he still has a bottle of this at home that he will initiate the same day that he takes the Eloxatine. The regimen will include treatment every 2 weeks monitoring the neuropathy very close. I am going to go ahead and measure a CA 19-9 today  to have a new baseline before the initiation of this. I want to give him 2 weeks of break to allow him to gain back strength and through calorie and muscle situation in regard to muscle mass and allow him to gain performance status back. Since the resolution of the diarrhea he has been feeling substantially better. In regard to the pain medication he will continue using his remote control to control the pump that was placed by Dr. Shaheed Bertrand.    If in the future the patient requires any pain medication for neuropathy the medicine that we will use is Neurontin. The dose will be 100 to 200 mg 2 times a day.    I have not advised the patient to use any Lyrica because this medicine has profound amount of side effects and I do not want to take this avenue with this patient at this time. After lengthy conversation he was willing to proceed.     He will be seen by nurse practitioner in 3 weeks and he will be seen by me in 5 weeks.

## 2017-09-19 NOTE — PROGRESS NOTES
Staff message forwarded from Demi JONES, Informatics RN from Dr Manley-Pt will start back on Xeloda. See note below.    Demi Estrada RN  [facility] Pre-Cert Inbasket Pool                       Previous Messages       ----- Message -----      From: Fran Manley MD      Sent: 9/19/2017   1:28 PM        To: Demi Estrada RN     He needs oxaloplatin every 2 weeks start in 3 weeks he will take xeloda same dose than before that he has at home, dose of oxalo is the same adjusted dose than before, no more liposomal cpt 11          Pt was on Xeloda in 2015 at the dose of 4 tabs po twice a day for 7 days on then 7 days off. I will confirm this with Dr Manley and proceed once dosing is confirmed.

## 2017-09-21 NOTE — ED NOTES
"Pt reports abd pain that has been going on since 1800 \"around his pain pump\" which was not recently placed. He does report diarrhea and denies n/v. He also denies fever or chills. Reassurance given; call light in reach. Pts breathing even and unlabored. Pt appears in NAD at this time. Family at bedside.      Lily Salinas RN  09/21/17 0137    "
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
## 2017-09-21 NOTE — ED TRIAGE NOTES
Patient has stomach pains tonight he took pain medication and that is not helping his pain. He also has pancreatic cancer and a spot on his liver that maybe causing the pain

## 2017-10-10 NOTE — PROGRESS NOTES
Call rec from Lanie Amado NP who is seeing pt today. Pt was restarted on Xeloda by Dr Manley and unfortunately, the bottle he had at home from previous treatment  in 3/2017. I will send a new rx to [facility] with STAT request. Pt was due to start today.

## 2017-10-10 NOTE — PROGRESS NOTES
Treatment plan placed for Oxaliplatin/Xeloda with total dose of Oxaliplatin being 170mg as ordered by Dr. Manley.
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
## 2017-10-13 NOTE — TELEPHONE ENCOUNTER
Wife states that the mail order delivering their xeloda told them to check on interaction with meloxicam and folic acid. Looked per micromedex and no interactions with either med. Wife v/u.

## 2017-10-24 NOTE — PROGRESS NOTES
REASONS FOR FOLLOWUP:    Metastatic pancreatic cancer to liver and sacrum.  The patient has undergone successful plan of radiation treatment to the sacrum on 2 different occasions and stereotactic treatment for a liver metastasis, solitary.  The patient also has undergone therapy with Eloxatin/Xeloda successfully.Continuation of weekly 5fu and leucovorin as per 5/16/17    HISTORY OF PRESENT ILLNESS:   This patient is here today in the company of his wife in order to be reviewed and decide how to proceed in regard to further therapy of his pancreatic cancer with liver, peritoneal and bone metastasis. The patient was given 5FU, leucovorin and liposomal CPT-11 almost 3 weeks ago. He developed after that profound prostration and profuse diarrhea to the point  that he required hydration every day last week in this office. At that point Imodium and Lomotil were not working and the patient was advised to proceed with Sandostatin subcutaneous injection every day that finally made the difference having no further diarrhea since Sunday. The patient now has started to eat and he feels substantially better because his appetite is returning and his strength is improving because his volume status has normalized. He has normal urination. He has not seen any modification in the amount of pain that he is experiencing in his lumbar area. He continues adjusting the pain medicine with his remote control in the implanted pump. He has no cough, no sputum production. He has not had any fever or chills. He has no other cancer related pain and he does not feel that his abdomen is distended like it used to be before.     Given the above events liposomal CPT-11 has been discontinued along with 5FU and leucovorin. 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 This medicine was discontinued because of neuropathy not because of the lack of efficacy, therefore we are going to back onto this regimen  monitoring the neuropathy very close. He has Xeloda at home that he has leftover from the previous dose and he will take the Xeloda the week that he receives the Eloxatine. Again plan to initiate this again in 3 weeks and every 2 weeks depending on the situation.       The patient was further reviewed on 10/24/2017 and since the initiation of Xeloda and Eloxatine he has been feeling remarkably better. He has had complete resolution of his diarrhea. He has not had any abdominal pain, his appetite has improved. His strength has improved to the point that he is walking through the house many times a day and he is going out with his wife in the car for rides. His cancer related pain neuropathic in nature in the right buttocks remains about the same and he will see Dr. Shaheed Bertrand very soon for further adjustment in the pain medicine. He has not developed any new areas of cancer related pain. Urination is ongoing with no new difficulties. He has not had any sensation of abdominal distention with ascites and he has not had any fevers or chills. He has not had any worsening of the peripheral neuropathy in his feet. He has had an excellent tolerance to the Xeloda.     His hematological parameters on this occasion remain stable with appropriate white count, hemoglobin and platelets therefore his treatment was continued. He is aware that intermittently depending on the status of the neuropathy there will be times when he will be on Xeloda only and Eloxatine intermittently depending again on clinical status.               PAST MEDICAL HISTORY:     1.  Morbid obesity.     2. History of hypertension.     3. Hyperlipidemia.     4. He has no history of diabetes, neither history of thyroid disease, coronary disease, and cardiac arrhythmia.    5.   He had a syncopal episode in December 2014.  Conclusion was that the patient had an episode of low blood pressure and these medicines were adjusted accordingly.     6. He has no history  of colonoscopy in the past.     7. History of enlarged prostate with a normal PSA.     8. 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     9.   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  His diplopia improved spontaneously.     10. He has no history of previous surgeries.         HEMATOLOGIC/ONCOLOGIC HISTORY: History from previous dates can be found in the separate document.         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  We adjusted down the E  loxatin to 170 mg total, given his minor leukopenia and minor thrombocytopenia.          His pain medication will remain the same.  His doxepin will remain the same.  In regard to extravasation   of Eloxatin in the left upper extremity, local therapy will remain the same.      The patient is next seen in the office March 07, 2016 stable for all aspects.  We plan to continue chemotherapy at the same doses at this time and scan him in the next several months and follow-up      ALLERGIES:  No known drug allergies.         MEDICATIONS:  The current medication list was reviewed with the patient and updated in the EMR this date per the medical assistant.  Medication dosages and frequencies were confirmed to be accurate.     SOCIAL   HISTORY: The patient used to work for [employer] for a long time, most of his life.  He retired many years ago.  He stays at home busy doing many activities, including fishing, mowing, gardening, and many other chores.  He was exposed to chemicals w  h  en he worked at [employer], but he used to have protective equipment. He is not aware of any other coworkers who have been exposed to anything, or diagnosed with cancer.  He has 3 stepchildren in good  health.  He used to smoke for 20 years; he quit   30 years ago.  He used to smoke a pack of cigarettes a day.  He does not drink any alcohol.         FAMILY HISTORY:  His mother  as a consequence of primary liver cancer and she was diagnosed in .  He had a sister who had multiple myeloma and received care at [facility].  His wife and the children do not have any other significant illnesses.           REVIEW OF SYSTEMS:   General: no fever, chills, RESOLVED fatigue, and weight changes, and lack of appetite.  Eyes: no epiphora, xerophthalmia,conjunctivitis, pain, glaucoma, blurred vision, blindness, secretion, photophobia, proptosis, diplopia.  Ears: no otorrhea, tinnitus, otorrhagia, deafness, pain, vertigo.  Nose: no rhinorrhea, epistaxis, alteration in perception of odors, sinuses pressure.  Mouth: no alteration in gums or teeth,  ulcers, no difficulty with mastication or deglut ion, no odynophagia.  Neck: no masses or pain, no thyroid alterations, no pain in muscles or arteries, no carotid odynia, no crepitation.  Respiratory: no cough, sputum production, dyspnea, trepopnea, pleuritic pain, hemoptysis.  Heart: no syncope, irregularity, palpitations, angina, orthopnea, paroxysmal nocturnal dyspnea.  Vascular Venous: no tenderness,edema, palpable cords, postphlebitic syndrome, skin changes or ulcerations.  Vascular Arterial: no distal ischemia, claudication, gangrene, neuropathic ischemic pain, skin ulcers, paleness or cyanosis.  GI: no dysphagia, odynophagia, no regurgitation, no heartburn,no indigestion,no nausea,no vomiting,no hematemesis ,no melena,no jaundice,STAED distention, no obstipation,no enterorrhagia,no proctalgia,no anal  lesions, stated changes in bowel habits.  : no frequency, hesitancy, hematuria, discharge, pain.  Musculoskeletal: no muscle or tendon pain or inflammation, joint pain, edema, functional limitation as expected given his bone mets and neuropathic pain,no fasciculations,  "mass.  Neurologic: no headache, seizures, alterations on Craneal nerves, no NEW motor or senssory deficit, normal coordination, no alteration in memory, orientation, calculation,writting, verbal or written language.  Skin: no rashes, pruritus , no new changes in his left forearm lesion.  Psychiatric: no anxiety, depression, agitation, delusions, proper insight.        VITAL SIGNS:   Vitals:    10/24/17 0732   BP: 118/73   Pulse: 92   Resp: 18   Temp: 97.7 °F (36.5 °C)   TempSrc: Oral   SpO2: 98%   Weight: 225 lb (102 kg)   Height: 73\" (185.4 cm)          PHYSICAL EXAMINATION:      GENERAL:  White male in no acute distress, lying on the bed on his side.  The degree of pain intensity was no more than 2 out of 10.     SKIN:  Lesion in the skin in his forearm anteriorly on the left side remains indurated with no pain, no eryth  ema, no local inflammation with normal range-of-motion in the flexion and extension of the left elbow.  He had no edema in his hand.  The area of infiltration of the skin measures close to 2 x 2 cm in size.  The rest of the skin examination reveals mild erythema with occasional papular lesion.       EYES:  Pupils were equal and reactive to light and accommodation.  Extraocular movements were normal.     MOUTH:  Oral mucosals were normal.    NECK:  Disclosed no palpable cervical adenopathies, no thyroid enlargement. CHEST:  Lungs were clear with good breath sounds bilaterally.  No wheezing, crackles, rhonchi or rubs.  His heart was regular with occasional premature contractions.     ABDOMEN:  Soft, nontender, nodular  liver enlargement 1 cm brcm,.  No splenomegaly.  No palpable abdominal masses.  negative ascites.  No pain.     MUSCULOSKELETAL:  Disclosed minimal tenderness in the sacrum.  No soft tissue mass, no deformity, no local increase in the temperature.  No cauda equina syndrome.     EXTREMITIES: Disclosed no tenderness, no edema.  Left upper extremity as already described-improved from " previous  NEUROLOGICAL:  Showed a patient that was fully awake, alert, oriented to time and place, able to carry a normal conversation, walking with some degree of difficulty given the intensity of the pain in the sacrum.         LABORATORY DATA:  Component      Latest Ref Rng & Units 6/13/2017 8/15/2017 9/19/2017           1:25 PM 12:55 PM  1:58 PM   CA 19-9      <=35.0 U/mL 77 (H) 134.8 (H) 361.8 (H)     CBC Auto Differential   Order: [id number] - Part of Panel Order [id number]   Status:  Final result   Visible to patient:  No (Not Released) Dx:  Bone metastasis; Liver metastases; Pa...      Ref Range & Units 7:37 AM     WBC 4.50 - 10.70 10*3/mm3 4.56   RBC 4.60 - 6.00 10*6/mm3 3.03 (L)   Hemoglobin 13.7 - 17.6 g/dL 10.5 (L)   Hematocrit 40.4 - 52.2 % 30.9 (L)   MCV 79.8 - 96.2 fL 102.0 (H)   MCH 27.0 - 32.7 pg 34.7 (H)   MCHC 32.6 - 36.4 g/dL 34.0   RDW 11.5 - 14.5 % 17.0 (H)   RDW-SD 37.0 - 54.0 fl 61.5 (H)   MPV 6.0 - 12.0 fL 12.8 (H)   Platelets 140 - 500 10*3/mm3 114 (L)   Neutrophil % 42.7 - 76.0 % 70.2   Lymphocyte % 19.6 - 45.3 % 11.4 (L)   Monocyte % 5.0 - 12.0 % 13.8 (H)   Eosinophil % 0.3 - 6.2 % 3.7   Basophil % 0.0 - 1.5 % 0.7   Immature Grans % 0.0 - 0.5 % 0.2   Neutrophils, Absolute 1.90 - 8.10 10*3/mm3 3.20   Lymphocytes, Absolute 0.90 - 4.80 10*3/mm3 0.52 (L)   Monocytes, Absolute 0.20 - 1.20 10*3/mm3 0.63               ASSESSMENT:        1. This patient has metastatic pancreatic cancer to the sacrum, bone and to the liver undergoing chemotherapy with 5FU and leucovorin on a weekly basis.For the last 2 weeks the patient developed abdominal distention, we documented ascites and now the cytology of the fluid, as stated above, documents malignant cells, meaning progression. On the other hand, his primary liver metastases and the sacral metastases remain about the same, recently documented.  The patient’s tumor marker raised, stated above, from the 70 category to the 361 category.    In regard to pain  control, the patient continues doing very well and he will continue following up with Dr. Shaheed Bertrand in this regard.      Since the discontinuation of CPT-11, 5FU/leucovorin the patient feels dramatically better. Pain control is very much appropriate, appetite has improved, diarrhea has disappeared, urination is ongoing and he has not had any evidence of progressive ascites. The patient feels remarkably better. Hematological parameters are stable. He has not had any worsening of the peripheral neuropathy with resumption of Eloxatine.    RECOMMENDATIONS:  1. The patient will remain on his Xeloda starting today for 7 days on, 7 days off and at the previous dose. He will remain on Eloxatine every 2 weeks and we will continue monitoring peripheral neuropathy toxicity. Again he is aware that it will be time when he will need to have the Eloxatine on hold and he will remain on the Xeloda.  2. From the point of view of pain control, he will see Dr. Shaheed Bertrand very soon.  3. In regard to the rest of the medicines that he is taking, he will remain on his vitamin D supplement, his aspirin supplement and he will have no other new issues.   4. We will ask him to come back in 2 weeks to be seen by nurse practitioner and monitor neuropathy and he will remain on treatment again back in a month when he will be seen by physician. We are planning to review his tumor marker in a month and to compare this with the number stated above that was 371 that was measured before the initiation of the regimen with Eloxatine and Xeloda.    For now I find no reason to proceed with radiological assessment.

## 2017-10-31 PROBLEM — N13.2 URETERAL STONE WITH HYDRONEPHROSIS: Status: ACTIVE | Noted: 2017-01-01

## 2017-10-31 NOTE — ANESTHESIA POSTPROCEDURE EVALUATION
"Patient: Bk Guerra    Procedure Summary     Date Anesthesia Start Anesthesia Stop Room / Location    10/31/17 1022 1123  [facility] / [facility]       Procedure Diagnosis Surgeon Provider    URETEROSCOPY LASER LITHOTRIPSY WITH STENT INSERTION (Left ) No diagnosis on file. MD Caitlin Zarate MD          Anesthesia Type: general  Last vitals  BP   121/74 (10/31/17 1150)   Temp   36.4 °C (97.5 °F) (10/31/17 1150)   Pulse   79 (10/31/17 1150)   Resp   16 (10/31/17 1150)     SpO2   97 % (10/31/17 1150)     Post Anesthesia Care and Evaluation    Patient location during evaluation: bedside  Patient participation: complete - patient participated  Level of consciousness: awake  Pain score: 2  Pain management: adequate  Airway patency: patent  Anesthetic complications: No anesthetic complications    Cardiovascular status: acceptable  Respiratory status: acceptable  Hydration status: acceptable    Comments: /74  Pulse 79  Temp 36.4 °C (97.5 °F) (Oral)   Resp 16  Ht 73\" (185.4 cm)  Wt 232 lb (105 kg)  SpO2 97%  BMI 30.61 kg/m2        "

## 2017-10-31 NOTE — ANESTHESIA PROCEDURE NOTES
Airway  Urgency: elective    Date/Time: 10/31/2017 10:33 AM  Airway not difficult    General Information and Staff    Patient location during procedure: OR  Anesthesiologist: LINH BOWEN    Indications and Patient Condition  Indications for airway management: airway protection    Preoxygenated: yes  MILS maintained throughout  Mask difficulty assessment: 1 - vent by mask    Final Airway Details  Final airway type: supraglottic airway      Successful airway: classic  Size 5    Number of attempts at approach: 1
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
## 2017-11-21 NOTE — PROGRESS NOTES
REASONS FOR FOLLOWUP:    Metastatic pancreatic cancer to liver and sacrum.  The patient has undergone successful plan of radiation treatment to the sacrum on 2 different occasions and stereotactic treatment for a liver metastasis, solitary.  The patient also has undergone therapy with Eloxatin/Xeloda successfully.Continuation of weekly 5fu and leucovorin as per 5/16/17    HISTORY OF PRESENT ILLNESS:   This patient is here today in company of his wife to continue therapy with Eloxatin along with Xeloda in the treatment of pancreatic cancer with liver and bone metastases. The good news is that the patient’s tumor marker had started to drop during the previous visit a month ago and this is the best news the patient could have. Goes along with the clinical picture the patient feels dramatically better. His appetite has improved. He has no cancer-related pain at this time and the pain is controlled with pain medicine through his pain specialist. The patient is having normal bowel activity, normal urination. No new issues in regard to kidney stones that he had removed almost 3 weeks ago by Dr. Cipriano Downs. He has not had any hematuria or urinary tract infection. I advised him to discontinue vitamin D at that time in consideration that this could be a culprit in regard to formation of kidney stones. The patient has had an excellent tolerance to Xeloda with no mucositis, diarrhea, palmar-plantar erythema. His hematological parameters have remained stable. The patient's overall performance status is dramatically better. He has not noticed any significant worsening of peripheral neuropathy.             PAST MEDICAL HISTORY:     1.  Morbid obesity.     2. History of hypertension.     3. Hyperlipidemia.     4. He has no history of diabetes, neither history of thyroid disease, coronary disease, and cardiac arrhythmia.    5.   He had a syncopal episode in December 2014.  Conclusion was that the patient had an  episode of low blood pressure and these medicines were adjusted accordingly.     6. He has no history of colonoscopy in the past.     7. History of enlarged prostate with a normal PSA.     8. 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     9.   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  His diplopia improved spontaneously.     10. He has no history of previous surgeries.         HEMATOLOGIC/ONCOLOGIC HISTORY: History from previous dates can be found in the separate document.         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  We adjusted down the E  loxatin to 170 mg total, given his minor leukopenia and minor thrombocytopenia.          His pain medication will remain the same.  His doxepin will remain the same.  In regard to extravasation   of Eloxatin in the left upper extremity, local therapy will remain the same.      The patient is next seen in the office March 07, 2016 stable for all aspects.  We plan to continue chemotherapy at the same doses at this time and scan him in the next several months and follow-up      ALLERGIES:  No known drug allergies.         MEDICATIONS:  The current medication list was reviewed with the patient and updated in the EMR this date per the medical assistant.  Medication dosages and frequencies were confirmed to be accurate.     SOCIAL   HISTORY: The patient used to work for [employer] for a long time, most of his life.  He retired many years ago.  He stays at home busy doing many activities, including fishing, mowing, gardening, and many other chores.  He was exposed to chemicals w  h  en he worked at [employer], but he used to have protective equipment. He is not aware of any other  coworkers who have been exposed to anything, or diagnosed with cancer.  He has 3 stepchildren in good health.  He used to smoke for 20 years; he quit   30 years ago.  He used to smoke a pack of cigarettes a day.  He does not drink any alcohol.         FAMILY HISTORY:  His mother  as a consequence of primary liver cancer and she was diagnosed in .  He had a sister who had multiple myeloma and received care at [facility].  His wife and the children do not have any other significant illnesses.           REVIEW OF SYSTEMS:   General: no fever, chills, RESOLVED fatigue, and weight changes, and lack of appetite.  Eyes: no epiphora, xerophthalmia,conjunctivitis, pain, glaucoma, blurred vision, blindness, secretion, photophobia, proptosis, diplopia.  Ears: no otorrhea, tinnitus, otorrhagia, deafness, pain, vertigo.  Nose: no rhinorrhea, epistaxis, alteration in perception of odors, sinuses pressure.  Mouth: no alteration in gums or teeth,  ulcers, no difficulty with mastication or deglut ion, no odynophagia.  Neck: no masses or pain, no thyroid alterations, no pain in muscles or arteries, no carotid odynia, no crepitation.  Respiratory: no cough, sputum production, dyspnea, trepopnea, pleuritic pain, hemoptysis.  Heart: no syncope, irregularity, palpitations, angina, orthopnea, paroxysmal nocturnal dyspnea.  Vascular Venous: no tenderness,edema, palpable cords, postphlebitic syndrome, skin changes or ulcerations.  Vascular Arterial: no distal ischemia, claudication, gangrene, neuropathic ischemic pain, skin ulcers, paleness or cyanosis.  GI: no dysphagia, odynophagia, no regurgitation, no heartburn,no indigestion,no nausea,no vomiting,no hematemesis ,no melena,no jaundice,STAED distention, no obstipation,no enterorrhagia,no proctalgia,no anal  lesions, stated changes in bowel habits.  : no frequency, hesitancy, hematuria, discharge, pain.  Musculoskeletal: no muscle or tendon pain or inflammation, joint pain,  "edema, functional limitation as expected given his bone mets and neuropathic pain,no fasciculations, mass.  Neurologic: no headache, seizures, alterations on Craneal nerves, no NEW motor or senssory deficit, normal coordination, no alteration in memory, orientation, calculation,writting, verbal or written language.  Skin: no rashes, pruritus , no new changes in his left forearm lesion.  Psychiatric: no anxiety, depression, agitation, delusions, proper insight.        VITAL SIGNS:   Vitals:    11/21/17 1256   BP: 103/69   Pulse: 95   Temp: 97.6 °F (36.4 °C)   TempSrc: Oral   SpO2: 94%   Weight: 224 lb 9.6 oz (102 kg)   Height: 73\" (185.4 cm)          PHYSICAL EXAMINATION:      GENERAL:  White male in no acute distress, lying on the bed on his side.  The degree of pain intensity was no more than 2 out of 10.     SKIN:  Lesion in the skin in his forearm anteriorly on the left side remains indurated with no pain, no eryth  ema, no local inflammation with normal range-of-motion in the flexion and extension of the left elbow.  He had no edema in his hand.  The area of infiltration of the skin measures close to 2 x 2 cm in size.  The rest of the skin examination reveals mild erythema with occasional papular lesion.       EYES:  Pupils were equal and reactive to light and accommodation.  Extraocular movements were normal.     MOUTH:  Oral mucosals were normal.    NECK:  Disclosed no palpable cervical adenopathies, no thyroid enlargement. CHEST:  Lungs were clear with good breath sounds bilaterally.  No wheezing, crackles, rhonchi or rubs.  His heart was regular with occasional premature contractions.     ABDOMEN:  Soft, nontender, nodular  liver enlargement 1 cm brcm,.  No splenomegaly.  No palpable abdominal masses.  negative ascites.  No pain.     MUSCULOSKELETAL:  Disclosed minimal tenderness in the sacrum.  No soft tissue mass, no deformity, no local increase in the temperature.  No cauda equina syndrome.   "   EXTREMITIES: Disclosed no tenderness, no edema.  Left upper extremity as already described-improved from previous  NEUROLOGICAL:  Showed a patient that was fully awake, alert, oriented to time and place, able to carry a normal conversation, walking with some degree of difficulty given the intensity of the pain in the sacrum.         LABORATORY DATA:CBC Auto Differential   Order: [id number] - Part of Panel Order [id number]   Status:  Final result   Visible to patient:  No (Not Released) Dx:  Splenomegaly; Acquired thrombocytopen...      Ref Range & Units 12:46 PM     WBC 4.50 - 10.70 10*3/mm3 3.83 (L)   RBC 4.60 - 6.00 10*6/mm3 3.06 (L)   Hemoglobin 13.7 - 17.6 g/dL 10.6 (L)   Hematocrit 40.4 - 52.2 % 32.1 (L)   MCV 79.8 - 96.2 fL 104.9 (H)   MCH 27.0 - 32.7 pg 34.6 (H)   MCHC 32.6 - 36.4 g/dL 33.0   RDW 11.5 - 14.5 % 20.8 (H)   RDW-SD 37.0 - 54.0 fl 77.0 (H)   MPV 6.0 - 12.0 fL 12.6 (H)   Platelets 140 - 500 10*3/mm3 101 (L)   Neutrophil % 42.7 - 76.0 % 63.1   Lymphocyte % 19.6 - 45.3 % 14.1 (L)   Monocyte % 5.0 - 12.0 % 17.0 (H)   Eosinophil % 0.3 - 6.2 % 5.0   Basophil % 0.0 - 1.5 % 0.5               Component      Latest Ref Rng & Units 6/13/2017 8/15/2017 9/19/2017 10/24/2017           1:25 PM 12:55 PM  1:58 PM  7:37 AM   CA 19-9      <=35.0 U/mL 77 (H) 134.8 (H) 361.8 (H) 202.3 (H)       ASSESSMENT:        1.This patient has pancreatic cancer with liver and bone metastasis undergoing at this time therapy with Xeloda 7 days on 7 days off and he takes the Xeloda the week that he receives the Eloxatine. The dose of Eloxatine needed to be modified during the previous visit due to minor worsening of peripheral neuropathy and since then he has not noticed any difference. He has no functional limitations with the neuropathy neither pain implications from this point of view. The patient feels dramatically better. His energy is good, his bowel activity is normal, urination is back to normal and he has no fevers, no  chills, no bleeding on any level. He remains fully functional walking through the house able to take care of his own shower, getting dressed and doing activities with his wife when they ride in the car.     The pain is under excellent control through his PCA pump and pain specialist is Dr. Shaheed Bertrand.     The rest of the medicines that we reviewed with the patient today are unchanged with the exception that I asked him to discontinue the vitamin D. It could be the culprit in regard to increased calcium absorption and favor kidney stones.    RECOMMENDATIONS:  1. For his cancer chemotherapy will be continued at the same doses as before monitoring peripheral neuropathy worsening. So far it has not happened again.   2. For his hematological parameters including anemia neoplastic disease and thrombocytopenia associated with splenomegaly and fatty liver the patient has no need for any adjustment in medications neither any other special care for this.   3. Pain control is appropriate.  4. Performance status has improved to the point that the patient is dramatically functional and better.  5. I find no other modification in care plan besides continuation of visits for chemotherapy and doctor visit and nurse practitioner visit. I find no need for radiological assessment.  6. He will be called at home with the report of his CA 19-9 drawn today and once the report becomes available in a few hours.   7. I will review him back in a month and he will have chemo in 2, 4 and 6 weeks.
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
## 2017-12-19 NOTE — PROGRESS NOTES
Patient here today in anticipation of chemotherapy with oxaliplatin, and is due to start his next cycle of Xeloda.  He reports he has been feeling fairly well.  He denies any problems or concerns.  His platelet count today has dropped to 82,000.  I have reviewed with Dr. Gale.  We will have the patient delay this cycle for 1 week.  He should return in one week for reevaluation and to proceed with treatment.    AMEENA Grove

## 2017-12-26 NOTE — PROGRESS NOTES
CBC R/W DR. LAIRD. PLTS NOTED AGAIN TODAY LOW AT 86K. ORDERS TO HOLD TREATMENT AND RTN NEXT WK AS SCHEDULED. NEXT CYCLE DOSE WILL LIKELY NEED TO BE REDUCED. PT GIVEN COPIES OF LAB RESULTS AND INSTRUCTED TO CALL W/ ANY ISSUES.

## 2018-01-01 ENCOUNTER — OFFICE VISIT (OUTPATIENT)
Dept: ONCOLOGY | Facility: CLINIC | Age: 68
End: 2018-01-01

## 2018-01-01 ENCOUNTER — HOSPITAL ENCOUNTER (OUTPATIENT)
Dept: ULTRASOUND IMAGING | Facility: HOSPITAL | Age: 68
Discharge: HOME | End: 2018-05-31
Attending: INTERNAL MEDICINE

## 2018-01-01 ENCOUNTER — TELEPHONE (OUTPATIENT)
Dept: ONCOLOGY | Facility: CLINIC | Age: 68
End: 2018-01-01

## 2018-01-01 ENCOUNTER — INFUSION (OUTPATIENT)
Dept: ONCOLOGY | Facility: HOSPITAL | Age: 68
End: 2018-01-01

## 2018-01-01 ENCOUNTER — TRANSCRIBE ORDERS (OUTPATIENT)
Dept: ADMINISTRATIVE | Facility: HOSPITAL | Age: 68
End: 2018-01-01

## 2018-01-01 ENCOUNTER — APPOINTMENT (OUTPATIENT)
Dept: ONCOLOGY | Facility: CLINIC | Age: 68
End: 2018-01-01

## 2018-01-01 ENCOUNTER — RESULTS ENCOUNTER (OUTPATIENT)
Dept: ONCOLOGY | Facility: CLINIC | Age: 68
End: 2018-01-01

## 2018-01-01 ENCOUNTER — APPOINTMENT (OUTPATIENT)
Dept: ULTRASOUND IMAGING | Facility: HOSPITAL | Age: 68
End: 2018-01-01
Attending: INTERNAL MEDICINE

## 2018-01-01 ENCOUNTER — ANESTHESIA (OUTPATIENT)
Dept: PERIOP | Facility: HOSPITAL | Age: 68
End: 2018-01-01

## 2018-01-01 ENCOUNTER — HOSPITAL ENCOUNTER (OUTPATIENT)
Dept: ULTRASOUND IMAGING | Facility: HOSPITAL | Age: 68
Discharge: HOME | End: 2018-02-14
Attending: INTERNAL MEDICINE | Admitting: INTERNAL MEDICINE

## 2018-01-01 ENCOUNTER — TELEPHONE (OUTPATIENT)
Dept: INTERVENTIONAL RADIOLOGY/VASCULAR | Facility: HOSPITAL | Age: 68
End: 2018-01-01

## 2018-01-01 ENCOUNTER — HOSPITAL ENCOUNTER (OUTPATIENT)
Dept: ULTRASOUND IMAGING | Facility: HOSPITAL | Age: 68
Discharge: HOME | End: 2018-05-14
Attending: INTERNAL MEDICINE | Admitting: RADIOLOGY

## 2018-01-01 ENCOUNTER — TELEPHONE (OUTPATIENT)
Dept: ONCOLOGY | Facility: HOSPITAL | Age: 68
End: 2018-01-01

## 2018-01-01 ENCOUNTER — HOSPITAL ENCOUNTER (OUTPATIENT)
Dept: ULTRASOUND IMAGING | Facility: HOSPITAL | Age: 68
Discharge: HOME | End: 2018-06-14
Attending: INTERNAL MEDICINE

## 2018-01-01 ENCOUNTER — APPOINTMENT (OUTPATIENT)
Dept: GENERAL RADIOLOGY | Facility: HOSPITAL | Age: 68
End: 2018-01-01

## 2018-01-01 ENCOUNTER — HOSPITAL ENCOUNTER (OUTPATIENT)
Dept: PET IMAGING | Facility: HOSPITAL | Age: 68
Discharge: HOME | End: 2018-02-06
Attending: INTERNAL MEDICINE | Admitting: INTERNAL MEDICINE

## 2018-01-01 ENCOUNTER — HOSPITAL ENCOUNTER (EMERGENCY)
Facility: HOSPITAL | Age: 68
Discharge: HOME | End: 2018-05-28
Attending: EMERGENCY MEDICINE | Admitting: EMERGENCY MEDICINE

## 2018-01-01 ENCOUNTER — APPOINTMENT (OUTPATIENT)
Dept: LAB | Facility: HOSPITAL | Age: 68
End: 2018-01-01

## 2018-01-01 ENCOUNTER — DOCUMENTATION (OUTPATIENT)
Dept: ONCOLOGY | Facility: CLINIC | Age: 68
End: 2018-01-01

## 2018-01-01 ENCOUNTER — LAB (OUTPATIENT)
Dept: OTHER | Facility: HOSPITAL | Age: 68
End: 2018-01-01

## 2018-01-01 ENCOUNTER — HOSPITAL ENCOUNTER (INPATIENT)
Facility: HOSPITAL | Age: 68
LOS: 7 days | Discharge: HOSPICE | End: 2018-06-06
Attending: EMERGENCY MEDICINE | Admitting: INTERNAL MEDICINE

## 2018-01-01 ENCOUNTER — APPOINTMENT (OUTPATIENT)
Dept: CT IMAGING | Facility: HOSPITAL | Age: 68
End: 2018-01-01

## 2018-01-01 ENCOUNTER — HOSPITAL ENCOUNTER (INPATIENT)
Facility: HOSPITAL | Age: 68
LOS: 14 days | End: 2018-06-20
Attending: INTERNAL MEDICINE | Admitting: INTERNAL MEDICINE

## 2018-01-01 ENCOUNTER — TELEPHONE (OUTPATIENT)
Dept: GENERAL RADIOLOGY | Facility: HOSPITAL | Age: 68
End: 2018-01-01

## 2018-01-01 ENCOUNTER — ANESTHESIA EVENT (OUTPATIENT)
Dept: PERIOP | Facility: HOSPITAL | Age: 68
End: 2018-01-01

## 2018-01-01 VITALS
DIASTOLIC BLOOD PRESSURE: 54 MMHG | OXYGEN SATURATION: 96 % | TEMPERATURE: 96.9 F | RESPIRATION RATE: 18 BRPM | SYSTOLIC BLOOD PRESSURE: 94 MMHG | BODY MASS INDEX: 29.16 KG/M2 | WEIGHT: 220 LBS | HEIGHT: 73 IN | HEART RATE: 81 BPM

## 2018-01-01 VITALS
BODY MASS INDEX: 29.24 KG/M2 | HEART RATE: 77 BPM | TEMPERATURE: 97.4 F | SYSTOLIC BLOOD PRESSURE: 108 MMHG | HEIGHT: 73 IN | WEIGHT: 220.6 LBS | DIASTOLIC BLOOD PRESSURE: 71 MMHG | RESPIRATION RATE: 18 BRPM

## 2018-01-01 VITALS
TEMPERATURE: 97.5 F | SYSTOLIC BLOOD PRESSURE: 93 MMHG | BODY MASS INDEX: 24.61 KG/M2 | RESPIRATION RATE: 16 BRPM | OXYGEN SATURATION: 95 % | HEIGHT: 72 IN | DIASTOLIC BLOOD PRESSURE: 61 MMHG | WEIGHT: 181.7 LBS | HEART RATE: 86 BPM

## 2018-01-01 VITALS
TEMPERATURE: 97.4 F | HEART RATE: 96 BPM | BODY MASS INDEX: 31.41 KG/M2 | HEIGHT: 73 IN | RESPIRATION RATE: 16 BRPM | DIASTOLIC BLOOD PRESSURE: 65 MMHG | OXYGEN SATURATION: 96 % | SYSTOLIC BLOOD PRESSURE: 103 MMHG

## 2018-01-01 VITALS
HEART RATE: 83 BPM | OXYGEN SATURATION: 95 % | DIASTOLIC BLOOD PRESSURE: 62 MMHG | BODY MASS INDEX: 28.49 KG/M2 | WEIGHT: 210.1 LBS | SYSTOLIC BLOOD PRESSURE: 98 MMHG | RESPIRATION RATE: 16 BRPM | TEMPERATURE: 97.6 F

## 2018-01-01 VITALS
BODY MASS INDEX: 24.61 KG/M2 | WEIGHT: 181.69 LBS | OXYGEN SATURATION: 92 % | TEMPERATURE: 96 F | SYSTOLIC BLOOD PRESSURE: 103 MMHG | DIASTOLIC BLOOD PRESSURE: 64 MMHG | HEIGHT: 72 IN

## 2018-01-01 VITALS
HEIGHT: 73 IN | RESPIRATION RATE: 16 BRPM | SYSTOLIC BLOOD PRESSURE: 121 MMHG | BODY MASS INDEX: 30.35 KG/M2 | OXYGEN SATURATION: 96 % | DIASTOLIC BLOOD PRESSURE: 73 MMHG | TEMPERATURE: 97.6 F | HEART RATE: 97 BPM | WEIGHT: 229 LBS

## 2018-01-01 VITALS
HEIGHT: 73 IN | DIASTOLIC BLOOD PRESSURE: 61 MMHG | BODY MASS INDEX: 28.23 KG/M2 | HEART RATE: 88 BPM | TEMPERATURE: 97.7 F | RESPIRATION RATE: 18 BRPM | OXYGEN SATURATION: 96 % | SYSTOLIC BLOOD PRESSURE: 98 MMHG | WEIGHT: 213 LBS

## 2018-01-01 VITALS
HEART RATE: 78 BPM | TEMPERATURE: 97.5 F | BODY MASS INDEX: 25.87 KG/M2 | HEIGHT: 72 IN | OXYGEN SATURATION: 93 % | DIASTOLIC BLOOD PRESSURE: 60 MMHG | SYSTOLIC BLOOD PRESSURE: 93 MMHG | RESPIRATION RATE: 17 BRPM | WEIGHT: 191 LBS

## 2018-01-01 VITALS
BODY MASS INDEX: 23.62 KG/M2 | HEART RATE: 101 BPM | SYSTOLIC BLOOD PRESSURE: 107 MMHG | OXYGEN SATURATION: 93 % | WEIGHT: 178.2 LBS | TEMPERATURE: 97.7 F | HEIGHT: 73 IN | DIASTOLIC BLOOD PRESSURE: 75 MMHG

## 2018-01-01 VITALS
HEART RATE: 87 BPM | BODY MASS INDEX: 32.23 KG/M2 | RESPIRATION RATE: 16 BRPM | OXYGEN SATURATION: 97 % | WEIGHT: 238 LBS | TEMPERATURE: 96.9 F | DIASTOLIC BLOOD PRESSURE: 63 MMHG | SYSTOLIC BLOOD PRESSURE: 107 MMHG | HEIGHT: 72 IN

## 2018-01-01 VITALS — WEIGHT: 238 LBS | BODY MASS INDEX: 31.41 KG/M2

## 2018-01-01 VITALS
DIASTOLIC BLOOD PRESSURE: 73 MMHG | SYSTOLIC BLOOD PRESSURE: 111 MMHG | RESPIRATION RATE: 16 BRPM | HEART RATE: 99 BPM | OXYGEN SATURATION: 95 % | HEIGHT: 73 IN | BODY MASS INDEX: 30.35 KG/M2 | TEMPERATURE: 97.4 F | WEIGHT: 229 LBS

## 2018-01-01 VITALS — WEIGHT: 178.2 LBS | BODY MASS INDEX: 23.52 KG/M2

## 2018-01-01 DIAGNOSIS — D69.6 THROMBOCYTOPENIA DUE TO SEQUESTRATION (HCC): ICD-10-CM

## 2018-01-01 DIAGNOSIS — C25.9 PANCREAS CARCINOMA (HCC): ICD-10-CM

## 2018-01-01 DIAGNOSIS — G89.3 CANCER ASSOCIATED PAIN: ICD-10-CM

## 2018-01-01 DIAGNOSIS — D69.6 ACQUIRED THROMBOCYTOPENIA (HCC): ICD-10-CM

## 2018-01-01 DIAGNOSIS — C78.7 LIVER METASTASES: ICD-10-CM

## 2018-01-01 DIAGNOSIS — R18.8 OTHER ASCITES: ICD-10-CM

## 2018-01-01 DIAGNOSIS — A41.9 SEPSIS, DUE TO UNSPECIFIED ORGANISM: Primary | ICD-10-CM

## 2018-01-01 DIAGNOSIS — R10.9 RIGHT FLANK PAIN: ICD-10-CM

## 2018-01-01 DIAGNOSIS — C25.9 PANCREAS CARCINOMA (HCC): Primary | ICD-10-CM

## 2018-01-01 DIAGNOSIS — D70.1 LEUKOPENIA DUE TO ANTINEOPLASTIC CHEMOTHERAPY (HCC): ICD-10-CM

## 2018-01-01 DIAGNOSIS — T45.1X5A LEUKOPENIA DUE TO ANTINEOPLASTIC CHEMOTHERAPY (HCC): ICD-10-CM

## 2018-01-01 DIAGNOSIS — R16.1 SPLENOMEGALY: ICD-10-CM

## 2018-01-01 DIAGNOSIS — C79.51 BONE METASTASIS: Primary | ICD-10-CM

## 2018-01-01 DIAGNOSIS — G62.0 NEUROPATHY DUE TO CHEMOTHERAPEUTIC DRUG (HCC): ICD-10-CM

## 2018-01-01 DIAGNOSIS — C79.51 BONE METASTASIS: ICD-10-CM

## 2018-01-01 DIAGNOSIS — C79.51 METASTASIS TO SPINAL COLUMN (HCC): ICD-10-CM

## 2018-01-01 DIAGNOSIS — D63.0 ANEMIA IN NEOPLASTIC DISEASE: ICD-10-CM

## 2018-01-01 DIAGNOSIS — R18.0 MALIGNANT ASCITES: ICD-10-CM

## 2018-01-01 DIAGNOSIS — N20.0 KIDNEY STONE: Primary | ICD-10-CM

## 2018-01-01 DIAGNOSIS — R10.12 LEFT UPPER QUADRANT PAIN: ICD-10-CM

## 2018-01-01 DIAGNOSIS — Z45.2 ENCOUNTER FOR ADJUSTMENT OR MANAGEMENT OF VASCULAR ACCESS DEVICE: Primary | ICD-10-CM

## 2018-01-01 DIAGNOSIS — N20.1 URETEROLITHIASIS: ICD-10-CM

## 2018-01-01 DIAGNOSIS — Z45.2 ENCOUNTER FOR ADJUSTMENT OR MANAGEMENT OF VASCULAR ACCESS DEVICE: ICD-10-CM

## 2018-01-01 DIAGNOSIS — T45.1X5A NEUROPATHY DUE TO CHEMOTHERAPEUTIC DRUG (HCC): ICD-10-CM

## 2018-01-01 LAB
ALBUMIN SERPL-MCNC: 2.6 G/DL (ref 3.5–5.2)
ALBUMIN SERPL-MCNC: 2.7 G/DL (ref 3.5–5.2)
ALBUMIN SERPL-MCNC: 2.8 G/DL (ref 3.5–5.2)
ALBUMIN SERPL-MCNC: 2.9 G/DL (ref 3.5–5.2)
ALBUMIN SERPL-MCNC: 3 G/DL (ref 3.5–5.2)
ALBUMIN/GLOB SERPL: 0.7 G/DL
ALBUMIN/GLOB SERPL: 0.8 G/DL
ALP SERPL-CCNC: 110 U/L (ref 39–117)
ALP SERPL-CCNC: 176 U/L (ref 39–117)
ALP SERPL-CCNC: 183 U/L (ref 39–117)
ALP SERPL-CCNC: 51 U/L (ref 39–117)
ALP SERPL-CCNC: 54 U/L (ref 39–117)
ALP SERPL-CCNC: 56 U/L (ref 39–117)
ALP SERPL-CCNC: 58 U/L (ref 39–117)
ALP SERPL-CCNC: 82 U/L (ref 39–117)
ALP SERPL-CCNC: 99 U/L (ref 39–117)
ALT SERPL W P-5'-P-CCNC: 17 U/L (ref 1–41)
ALT SERPL W P-5'-P-CCNC: 18 U/L (ref 1–41)
ALT SERPL W P-5'-P-CCNC: 20 U/L (ref 1–41)
ALT SERPL W P-5'-P-CCNC: 20 U/L (ref 1–41)
ALT SERPL W P-5'-P-CCNC: 26 U/L (ref 1–41)
ALT SERPL W P-5'-P-CCNC: 27 U/L (ref 1–41)
ALT SERPL W P-5'-P-CCNC: 31 U/L (ref 1–41)
ALT SERPL W P-5'-P-CCNC: 69 U/L (ref 1–41)
ALT SERPL W P-5'-P-CCNC: 83 U/L (ref 1–41)
ANION GAP SERPL CALCULATED.3IONS-SCNC: 10.1 MMOL/L
ANION GAP SERPL CALCULATED.3IONS-SCNC: 10.6 MMOL/L
ANION GAP SERPL CALCULATED.3IONS-SCNC: 11.1 MMOL/L
ANION GAP SERPL CALCULATED.3IONS-SCNC: 11.2 MMOL/L
ANION GAP SERPL CALCULATED.3IONS-SCNC: 11.3 MMOL/L
ANION GAP SERPL CALCULATED.3IONS-SCNC: 11.9 MMOL/L
ANION GAP SERPL CALCULATED.3IONS-SCNC: 11.9 MMOL/L
ANION GAP SERPL CALCULATED.3IONS-SCNC: 12.1 MMOL/L
ANION GAP SERPL CALCULATED.3IONS-SCNC: 13 MMOL/L
ANION GAP SERPL CALCULATED.3IONS-SCNC: 13 MMOL/L
ANION GAP SERPL CALCULATED.3IONS-SCNC: 13.3 MMOL/L
ANION GAP SERPL CALCULATED.3IONS-SCNC: 13.8 MMOL/L
ANION GAP SERPL CALCULATED.3IONS-SCNC: 8.4 MMOL/L
ANION GAP SERPL CALCULATED.3IONS-SCNC: 9.1 MMOL/L
APPEARANCE FLD: ABNORMAL
APPEARANCE FLD: CLEAR
APPEARANCE FLD: CLEAR
APTT PPP: 38.5 SECONDS (ref 22.7–35.4)
AST SERPL-CCNC: 103 U/L (ref 1–40)
AST SERPL-CCNC: 47 U/L (ref 1–40)
AST SERPL-CCNC: 47 U/L (ref 1–40)
AST SERPL-CCNC: 53 U/L (ref 1–40)
AST SERPL-CCNC: 56 U/L (ref 1–40)
AST SERPL-CCNC: 56 U/L (ref 1–40)
AST SERPL-CCNC: 63 U/L (ref 1–40)
AST SERPL-CCNC: 88 U/L (ref 1–40)
AST SERPL-CCNC: 93 U/L (ref 1–40)
BACTERIA SPEC AEROBE CULT: NORMAL
BACTERIA SPEC AEROBE CULT: NORMAL
BACTERIA UR QL AUTO: ABNORMAL /HPF
BASOPHILS # BLD AUTO: 0 10*3/MM3 (ref 0–0.2)
BASOPHILS # BLD AUTO: 0 10*3/MM3 (ref 0–0.2)
BASOPHILS # BLD AUTO: 0.01 10*3/MM3 (ref 0–0.2)
BASOPHILS # BLD AUTO: 0.02 10*3/MM3 (ref 0–0.2)
BASOPHILS # BLD AUTO: 0.02 10*3/MM3 (ref 0–0.2)
BASOPHILS # BLD AUTO: 0.03 10*3/MM3 (ref 0–0.2)
BASOPHILS # BLD AUTO: 0.06 10*3/MM3 (ref 0–0.2)
BASOPHILS NFR BLD AUTO: 0 % (ref 0–1.5)
BASOPHILS NFR BLD AUTO: 0 % (ref 0–1.5)
BASOPHILS NFR BLD AUTO: 0.1 % (ref 0–1.5)
BASOPHILS NFR BLD AUTO: 0.2 % (ref 0–1.5)
BASOPHILS NFR BLD AUTO: 0.4 % (ref 0–1.5)
BASOPHILS NFR BLD AUTO: 0.5 % (ref 0–1.5)
BASOPHILS NFR BLD AUTO: 0.5 % (ref 0–1.5)
BASOPHILS NFR BLD AUTO: 0.6 % (ref 0–1.5)
BASOPHILS NFR BLD AUTO: 0.7 % (ref 0–1.5)
BASOPHILS NFR BLD AUTO: 0.9 % (ref 0–1.5)
BILIRUB SERPL-MCNC: 0.6 MG/DL (ref 0.1–1.2)
BILIRUB SERPL-MCNC: 0.8 MG/DL (ref 0.1–1.2)
BILIRUB SERPL-MCNC: 0.8 MG/DL (ref 0.1–1.2)
BILIRUB SERPL-MCNC: 0.9 MG/DL (ref 0.1–1.2)
BILIRUB SERPL-MCNC: 0.9 MG/DL (ref 0.1–1.2)
BILIRUB SERPL-MCNC: 1 MG/DL (ref 0.1–1.2)
BILIRUB SERPL-MCNC: 1.7 MG/DL (ref 0.1–1.2)
BILIRUB SERPL-MCNC: 2 MG/DL (ref 0.1–1.2)
BILIRUB SERPL-MCNC: 2.1 MG/DL (ref 0.1–1.2)
BILIRUB UR QL STRIP: NEGATIVE
BUN BLD-MCNC: 10 MG/DL (ref 8–23)
BUN BLD-MCNC: 11 MG/DL (ref 8–23)
BUN BLD-MCNC: 11 MG/DL (ref 8–23)
BUN BLD-MCNC: 13 MG/DL (ref 8–23)
BUN BLD-MCNC: 13 MG/DL (ref 8–23)
BUN BLD-MCNC: 14 MG/DL (ref 8–23)
BUN BLD-MCNC: 15 MG/DL (ref 8–23)
BUN BLD-MCNC: 19 MG/DL (ref 8–23)
BUN BLD-MCNC: 25 MG/DL (ref 8–23)
BUN BLD-MCNC: 27 MG/DL (ref 8–23)
BUN BLD-MCNC: 29 MG/DL (ref 8–23)
BUN BLD-MCNC: 30 MG/DL (ref 8–23)
BUN BLD-MCNC: 30 MG/DL (ref 8–23)
BUN BLD-MCNC: 33 MG/DL (ref 8–23)
BUN/CREAT SERPL: 14.3 (ref 7–25)
BUN/CREAT SERPL: 16.2 (ref 7–25)
BUN/CREAT SERPL: 16.7 (ref 7–25)
BUN/CREAT SERPL: 16.9 (ref 7–25)
BUN/CREAT SERPL: 17.6 (ref 7–25)
BUN/CREAT SERPL: 18.6 (ref 7–25)
BUN/CREAT SERPL: 18.7 (ref 7–25)
BUN/CREAT SERPL: 20 (ref 7–25)
BUN/CREAT SERPL: 22.2 (ref 7–25)
BUN/CREAT SERPL: 23.4 (ref 7–25)
BUN/CREAT SERPL: 23.6 (ref 7–25)
BUN/CREAT SERPL: 24.3 (ref 7–25)
BUN/CREAT SERPL: 24.4 (ref 7–25)
BUN/CREAT SERPL: 25.2 (ref 7–25)
BURR CELLS BLD QL SMEAR: NORMAL
CALCIUM SPEC-SCNC: 8.4 MG/DL (ref 8.6–10.5)
CALCIUM SPEC-SCNC: 8.5 MG/DL (ref 8.6–10.5)
CALCIUM SPEC-SCNC: 8.5 MG/DL (ref 8.6–10.5)
CALCIUM SPEC-SCNC: 8.6 MG/DL (ref 8.6–10.5)
CALCIUM SPEC-SCNC: 8.7 MG/DL (ref 8.6–10.5)
CALCIUM SPEC-SCNC: 8.8 MG/DL (ref 8.6–10.5)
CANCER AG19-9 SERPL-ACNC: 229 U/ML (ref 0–35)
CANCER AG19-9 SERPL-ACNC: 257.1 U/ML
CANCER AG19-9 SERPL-ACNC: 419.1 U/ML
CANCER AG19-9 SERPL-ACNC: 733.5 U/ML
CHLORIDE SERPL-SCNC: 100 MMOL/L (ref 98–107)
CHLORIDE SERPL-SCNC: 101 MMOL/L (ref 98–107)
CHLORIDE SERPL-SCNC: 104 MMOL/L (ref 98–107)
CHLORIDE SERPL-SCNC: 104 MMOL/L (ref 98–107)
CHLORIDE SERPL-SCNC: 105 MMOL/L (ref 98–107)
CHLORIDE SERPL-SCNC: 106 MMOL/L (ref 98–107)
CHLORIDE SERPL-SCNC: 92 MMOL/L (ref 98–107)
CHLORIDE SERPL-SCNC: 92 MMOL/L (ref 98–107)
CHLORIDE SERPL-SCNC: 94 MMOL/L (ref 98–107)
CHLORIDE SERPL-SCNC: 94 MMOL/L (ref 98–107)
CHLORIDE SERPL-SCNC: 95 MMOL/L (ref 98–107)
CLARITY UR: ABNORMAL
CO2 SERPL-SCNC: 23.7 MMOL/L (ref 22–29)
CO2 SERPL-SCNC: 24 MMOL/L (ref 22–29)
CO2 SERPL-SCNC: 24 MMOL/L (ref 22–29)
CO2 SERPL-SCNC: 25.1 MMOL/L (ref 22–29)
CO2 SERPL-SCNC: 25.8 MMOL/L (ref 22–29)
CO2 SERPL-SCNC: 26.7 MMOL/L (ref 22–29)
CO2 SERPL-SCNC: 26.9 MMOL/L (ref 22–29)
CO2 SERPL-SCNC: 26.9 MMOL/L (ref 22–29)
CO2 SERPL-SCNC: 27.1 MMOL/L (ref 22–29)
CO2 SERPL-SCNC: 27.2 MMOL/L (ref 22–29)
CO2 SERPL-SCNC: 27.4 MMOL/L (ref 22–29)
CO2 SERPL-SCNC: 27.9 MMOL/L (ref 22–29)
CO2 SERPL-SCNC: 28.9 MMOL/L (ref 22–29)
CO2 SERPL-SCNC: 29.6 MMOL/L (ref 22–29)
COD CRY URNS QL: ABNORMAL /HPF
COLOR FLD: YELLOW
COLOR UR: ABNORMAL
CREAT BLD-MCNC: 0.65 MG/DL (ref 0.76–1.27)
CREAT BLD-MCNC: 0.66 MG/DL (ref 0.76–1.27)
CREAT BLD-MCNC: 0.7 MG/DL (ref 0.76–1.27)
CREAT BLD-MCNC: 0.7 MG/DL (ref 0.76–1.27)
CREAT BLD-MCNC: 0.74 MG/DL (ref 0.76–1.27)
CREAT BLD-MCNC: 0.75 MG/DL (ref 0.76–1.27)
CREAT BLD-MCNC: 0.75 MG/DL (ref 0.76–1.27)
CREAT BLD-MCNC: 1.06 MG/DL (ref 0.76–1.27)
CREAT BLD-MCNC: 1.11 MG/DL (ref 0.76–1.27)
CREAT BLD-MCNC: 1.17 MG/DL (ref 0.76–1.27)
CREAT BLD-MCNC: 1.19 MG/DL (ref 0.76–1.27)
CREAT BLD-MCNC: 1.24 MG/DL (ref 0.76–1.27)
CREAT BLD-MCNC: 1.35 MG/DL (ref 0.76–1.27)
CREAT BLD-MCNC: 1.35 MG/DL (ref 0.76–1.27)
CREAT BLDA-MCNC: 0.6 MG/DL (ref 0.6–1.3)
CYTO UR: NORMAL
D-LACTATE SERPL-SCNC: 1.2 MMOL/L (ref 0.5–2)
DEPRECATED RDW RBC AUTO: 65.8 FL (ref 37–54)
DEPRECATED RDW RBC AUTO: 67.9 FL (ref 37–54)
DEPRECATED RDW RBC AUTO: 68.6 FL (ref 37–54)
DEPRECATED RDW RBC AUTO: 72.4 FL (ref 37–54)
DEPRECATED RDW RBC AUTO: 73.1 FL (ref 37–54)
DEPRECATED RDW RBC AUTO: 73.9 FL (ref 37–54)
DEPRECATED RDW RBC AUTO: 74 FL (ref 37–54)
DEPRECATED RDW RBC AUTO: 74 FL (ref 37–54)
DEPRECATED RDW RBC AUTO: 74.2 FL (ref 37–54)
DEPRECATED RDW RBC AUTO: 74.8 FL (ref 37–54)
DEPRECATED RDW RBC AUTO: 74.9 FL (ref 37–54)
DEPRECATED RDW RBC AUTO: 75.3 FL (ref 37–54)
DEPRECATED RDW RBC AUTO: 76.8 FL (ref 37–54)
DEPRECATED RDW RBC AUTO: 77.7 FL (ref 37–54)
EOSINOPHIL # BLD AUTO: 0.01 10*3/MM3 (ref 0–0.7)
EOSINOPHIL # BLD AUTO: 0.03 10*3/MM3 (ref 0–0.7)
EOSINOPHIL # BLD AUTO: 0.03 10*3/MM3 (ref 0–0.7)
EOSINOPHIL # BLD AUTO: 0.04 10*3/MM3 (ref 0–0.7)
EOSINOPHIL # BLD AUTO: 0.05 10*3/MM3 (ref 0–0.7)
EOSINOPHIL # BLD AUTO: 0.08 10*3/MM3 (ref 0–0.7)
EOSINOPHIL # BLD AUTO: 0.08 10*3/MM3 (ref 0–0.7)
EOSINOPHIL # BLD AUTO: 0.12 10*3/MM3 (ref 0–0.7)
EOSINOPHIL # BLD AUTO: 0.12 10*3/MM3 (ref 0–0.7)
EOSINOPHIL # BLD AUTO: 0.13 10*3/MM3 (ref 0–0.7)
EOSINOPHIL # BLD AUTO: 0.14 10*3/MM3 (ref 0–0.7)
EOSINOPHIL # BLD AUTO: 0.15 10*3/MM3 (ref 0–0.7)
EOSINOPHIL # BLD AUTO: 0.15 10*3/MM3 (ref 0–0.7)
EOSINOPHIL # BLD AUTO: 0.22 10*3/MM3 (ref 0–0.7)
EOSINOPHIL NFR BLD AUTO: 0.1 % (ref 0.3–6.2)
EOSINOPHIL NFR BLD AUTO: 0.2 % (ref 0.3–6.2)
EOSINOPHIL NFR BLD AUTO: 0.3 % (ref 0.3–6.2)
EOSINOPHIL NFR BLD AUTO: 0.5 % (ref 0.3–6.2)
EOSINOPHIL NFR BLD AUTO: 0.5 % (ref 0.3–6.2)
EOSINOPHIL NFR BLD AUTO: 0.8 % (ref 0.3–6.2)
EOSINOPHIL NFR BLD AUTO: 0.9 % (ref 0.3–6.2)
EOSINOPHIL NFR BLD AUTO: 2.2 % (ref 0.3–6.2)
EOSINOPHIL NFR BLD AUTO: 2.4 % (ref 0.3–6.2)
EOSINOPHIL NFR BLD AUTO: 2.5 % (ref 0.3–6.2)
EOSINOPHIL NFR BLD AUTO: 2.5 % (ref 0.3–6.2)
EOSINOPHIL NFR BLD AUTO: 3 % (ref 0.3–6.2)
EOSINOPHIL NFR BLD AUTO: 3.7 % (ref 0.3–6.2)
EOSINOPHIL NFR BLD AUTO: 4.4 % (ref 0.3–6.2)
ERYTHROCYTE [DISTWIDTH] IN BLOOD BY AUTOMATED COUNT: 17.7 % (ref 11.5–14.5)
ERYTHROCYTE [DISTWIDTH] IN BLOOD BY AUTOMATED COUNT: 17.8 % (ref 11.5–14.5)
ERYTHROCYTE [DISTWIDTH] IN BLOOD BY AUTOMATED COUNT: 18.1 % (ref 11.5–14.5)
ERYTHROCYTE [DISTWIDTH] IN BLOOD BY AUTOMATED COUNT: 19 % (ref 11.5–14.5)
ERYTHROCYTE [DISTWIDTH] IN BLOOD BY AUTOMATED COUNT: 19.2 % (ref 11.5–14.5)
ERYTHROCYTE [DISTWIDTH] IN BLOOD BY AUTOMATED COUNT: 19.3 % (ref 11.5–14.5)
ERYTHROCYTE [DISTWIDTH] IN BLOOD BY AUTOMATED COUNT: 19.6 % (ref 11.5–14.5)
ERYTHROCYTE [DISTWIDTH] IN BLOOD BY AUTOMATED COUNT: 19.6 % (ref 11.5–14.5)
ERYTHROCYTE [DISTWIDTH] IN BLOOD BY AUTOMATED COUNT: 19.7 % (ref 11.5–14.5)
ERYTHROCYTE [DISTWIDTH] IN BLOOD BY AUTOMATED COUNT: 19.8 % (ref 11.5–14.5)
ERYTHROCYTE [DISTWIDTH] IN BLOOD BY AUTOMATED COUNT: 20.2 % (ref 11.5–14.5)
ERYTHROCYTE [DISTWIDTH] IN BLOOD BY AUTOMATED COUNT: 20.2 % (ref 11.5–14.5)
GFR SERPL CREATININE-BSD FRML MDRD: 104 ML/MIN/1.73
GFR SERPL CREATININE-BSD FRML MDRD: 104 ML/MIN/1.73
GFR SERPL CREATININE-BSD FRML MDRD: 105 ML/MIN/1.73
GFR SERPL CREATININE-BSD FRML MDRD: 112 ML/MIN/1.73
GFR SERPL CREATININE-BSD FRML MDRD: 112 ML/MIN/1.73
GFR SERPL CREATININE-BSD FRML MDRD: 120 ML/MIN/1.73
GFR SERPL CREATININE-BSD FRML MDRD: 123 ML/MIN/1.73
GFR SERPL CREATININE-BSD FRML MDRD: 53 ML/MIN/1.73
GFR SERPL CREATININE-BSD FRML MDRD: 53 ML/MIN/1.73
GFR SERPL CREATININE-BSD FRML MDRD: 58 ML/MIN/1.73
GFR SERPL CREATININE-BSD FRML MDRD: 61 ML/MIN/1.73
GFR SERPL CREATININE-BSD FRML MDRD: 62 ML/MIN/1.73
GFR SERPL CREATININE-BSD FRML MDRD: 66 ML/MIN/1.73
GFR SERPL CREATININE-BSD FRML MDRD: 70 ML/MIN/1.73
GLOBULIN UR ELPH-MCNC: 3.4 GM/DL
GLOBULIN UR ELPH-MCNC: 3.6 GM/DL
GLOBULIN UR ELPH-MCNC: 3.7 GM/DL
GLOBULIN UR ELPH-MCNC: 3.8 GM/DL
GLOBULIN UR ELPH-MCNC: 3.9 GM/DL
GLOBULIN UR ELPH-MCNC: 3.9 GM/DL
GLOBULIN UR ELPH-MCNC: 4 GM/DL
GLUCOSE BLD-MCNC: 104 MG/DL (ref 65–99)
GLUCOSE BLD-MCNC: 108 MG/DL (ref 65–99)
GLUCOSE BLD-MCNC: 111 MG/DL (ref 65–99)
GLUCOSE BLD-MCNC: 113 MG/DL (ref 65–99)
GLUCOSE BLD-MCNC: 120 MG/DL (ref 65–99)
GLUCOSE BLD-MCNC: 121 MG/DL (ref 65–99)
GLUCOSE BLD-MCNC: 123 MG/DL (ref 65–99)
GLUCOSE BLD-MCNC: 123 MG/DL (ref 65–99)
GLUCOSE BLD-MCNC: 125 MG/DL (ref 65–99)
GLUCOSE BLD-MCNC: 126 MG/DL (ref 65–99)
GLUCOSE BLD-MCNC: 128 MG/DL (ref 65–99)
GLUCOSE BLD-MCNC: 157 MG/DL (ref 65–99)
GLUCOSE BLD-MCNC: 98 MG/DL (ref 65–99)
GLUCOSE BLD-MCNC: 99 MG/DL (ref 65–99)
GLUCOSE UR STRIP-MCNC: NEGATIVE MG/DL
GRAM STN SPEC: NORMAL
GRAM STN SPEC: NORMAL
HCT VFR BLD AUTO: 30.9 % (ref 40.4–52.2)
HCT VFR BLD AUTO: 32.4 % (ref 40.4–52.2)
HCT VFR BLD AUTO: 32.6 % (ref 40.4–52.2)
HCT VFR BLD AUTO: 33.1 % (ref 40.4–52.2)
HCT VFR BLD AUTO: 33.4 % (ref 40.4–52.2)
HCT VFR BLD AUTO: 33.9 % (ref 40.4–52.2)
HCT VFR BLD AUTO: 34 % (ref 40.4–52.2)
HCT VFR BLD AUTO: 34.1 % (ref 40.4–52.2)
HCT VFR BLD AUTO: 34.3 % (ref 40.4–52.2)
HCT VFR BLD AUTO: 34.4 % (ref 40.4–52.2)
HCT VFR BLD AUTO: 35.4 % (ref 40.4–52.2)
HCT VFR BLD AUTO: 35.8 % (ref 40.4–52.2)
HCT VFR BLD AUTO: 36.9 % (ref 40.4–52.2)
HCT VFR BLD AUTO: 38.1 % (ref 40.4–52.2)
HGB BLD-MCNC: 10.4 G/DL (ref 13.7–17.6)
HGB BLD-MCNC: 10.7 G/DL (ref 13.7–17.6)
HGB BLD-MCNC: 10.8 G/DL (ref 13.7–17.6)
HGB BLD-MCNC: 11.1 G/DL (ref 13.7–17.6)
HGB BLD-MCNC: 11.2 G/DL (ref 13.7–17.6)
HGB BLD-MCNC: 11.3 G/DL (ref 13.7–17.6)
HGB BLD-MCNC: 11.4 G/DL (ref 13.7–17.6)
HGB BLD-MCNC: 11.5 G/DL (ref 13.7–17.6)
HGB BLD-MCNC: 11.5 G/DL (ref 13.7–17.6)
HGB BLD-MCNC: 11.9 G/DL (ref 13.7–17.6)
HGB BLD-MCNC: 12 G/DL (ref 13.7–17.6)
HGB BLD-MCNC: 12.3 G/DL (ref 13.7–17.6)
HGB BLD-MCNC: 12.6 G/DL (ref 13.7–17.6)
HGB BLD-MCNC: 13 G/DL (ref 13.7–17.6)
HGB UR QL STRIP.AUTO: ABNORMAL
HYALINE CASTS UR QL AUTO: ABNORMAL /LPF
IMM GRANULOCYTES # BLD: 0 10*3/MM3 (ref 0–0.03)
IMM GRANULOCYTES # BLD: 0.01 10*3/MM3 (ref 0–0.03)
IMM GRANULOCYTES # BLD: 0.02 10*3/MM3 (ref 0–0.03)
IMM GRANULOCYTES # BLD: 0.03 10*3/MM3 (ref 0–0.03)
IMM GRANULOCYTES # BLD: 0.04 10*3/MM3 (ref 0–0.03)
IMM GRANULOCYTES NFR BLD: 0 % (ref 0–0.5)
IMM GRANULOCYTES NFR BLD: 0.1 % (ref 0–0.5)
IMM GRANULOCYTES NFR BLD: 0.2 % (ref 0–0.5)
IMM GRANULOCYTES NFR BLD: 0.3 % (ref 0–0.5)
IMM GRANULOCYTES NFR BLD: 0.4 % (ref 0–0.5)
IMM GRANULOCYTES NFR BLD: 0.4 % (ref 0–0.5)
IMM GRANULOCYTES NFR BLD: 0.5 % (ref 0–0.5)
INR PPP: 1.1 (ref 0.8–1.2)
INR PPP: 1.19 (ref 0.9–1.1)
INR PPP: 1.2 (ref 0.8–1.2)
INR PPP: 1.27 (ref 0.9–1.1)
INR PPP: 1.33 (ref 0.9–1.1)
KETONES UR QL STRIP: NEGATIVE
LAB AP CASE REPORT: NORMAL
LDH FLD-CCNC: 46 U/L
LEUKOCYTE ESTERASE UR QL STRIP.AUTO: ABNORMAL
LYMPHOCYTES # BLD AUTO: 0.37 10*3/MM3 (ref 0.9–4.8)
LYMPHOCYTES # BLD AUTO: 0.39 10*3/MM3 (ref 0.9–4.8)
LYMPHOCYTES # BLD AUTO: 0.44 10*3/MM3 (ref 0.9–4.8)
LYMPHOCYTES # BLD AUTO: 0.52 10*3/MM3 (ref 0.9–4.8)
LYMPHOCYTES # BLD AUTO: 0.55 10*3/MM3 (ref 0.9–4.8)
LYMPHOCYTES # BLD AUTO: 0.56 10*3/MM3 (ref 0.9–4.8)
LYMPHOCYTES # BLD AUTO: 0.59 10*3/MM3 (ref 0.9–4.8)
LYMPHOCYTES # BLD AUTO: 0.6 10*3/MM3 (ref 0.9–4.8)
LYMPHOCYTES # BLD AUTO: 0.63 10*3/MM3 (ref 0.9–4.8)
LYMPHOCYTES # BLD AUTO: 0.63 10*3/MM3 (ref 0.9–4.8)
LYMPHOCYTES # BLD AUTO: 0.64 10*3/MM3 (ref 0.9–4.8)
LYMPHOCYTES # BLD AUTO: 0.79 10*3/MM3 (ref 0.9–4.8)
LYMPHOCYTES # BLD AUTO: 0.87 10*3/MM3 (ref 0.9–4.8)
LYMPHOCYTES # BLD AUTO: 1.07 10*3/MM3 (ref 0.9–4.8)
LYMPHOCYTES NFR BLD AUTO: 11.5 % (ref 19.6–45.3)
LYMPHOCYTES NFR BLD AUTO: 12.9 % (ref 19.6–45.3)
LYMPHOCYTES NFR BLD AUTO: 14.6 % (ref 19.6–45.3)
LYMPHOCYTES NFR BLD AUTO: 4.2 % (ref 19.6–45.3)
LYMPHOCYTES NFR BLD AUTO: 5.9 % (ref 19.6–45.3)
LYMPHOCYTES NFR BLD AUTO: 7 % (ref 19.6–45.3)
LYMPHOCYTES NFR BLD AUTO: 7.1 % (ref 19.6–45.3)
LYMPHOCYTES NFR BLD AUTO: 7.2 % (ref 19.6–45.3)
LYMPHOCYTES NFR BLD AUTO: 7.4 % (ref 19.6–45.3)
LYMPHOCYTES NFR BLD AUTO: 8 % (ref 19.6–45.3)
LYMPHOCYTES NFR BLD AUTO: 8.7 % (ref 19.6–45.3)
LYMPHOCYTES NFR BLD AUTO: 9.3 % (ref 19.6–45.3)
LYMPHOCYTES NFR BLD AUTO: 9.6 % (ref 19.6–45.3)
LYMPHOCYTES NFR BLD AUTO: 9.9 % (ref 19.6–45.3)
LYMPHOCYTES NFR FLD MANUAL: 34 %
LYMPHOCYTES NFR FLD MANUAL: 37 %
LYMPHOCYTES NFR FLD MANUAL: 61 %
Lab: NORMAL
MACROCYTES BLD QL SMEAR: NORMAL
MAGNESIUM SERPL-MCNC: 1.7 MG/DL (ref 1.6–2.4)
MCH RBC QN AUTO: 35 PG (ref 27–32.7)
MCH RBC QN AUTO: 35.1 PG (ref 27–32.7)
MCH RBC QN AUTO: 35.1 PG (ref 27–32.7)
MCH RBC QN AUTO: 35.2 PG (ref 27–32.7)
MCH RBC QN AUTO: 35.3 PG (ref 27–32.7)
MCH RBC QN AUTO: 35.4 PG (ref 27–32.7)
MCH RBC QN AUTO: 35.6 PG (ref 27–32.7)
MCH RBC QN AUTO: 35.7 PG (ref 27–32.7)
MCH RBC QN AUTO: 36 PG (ref 27–32.7)
MCH RBC QN AUTO: 36 PG (ref 27–32.7)
MCH RBC QN AUTO: 36.1 PG (ref 27–32.7)
MCH RBC QN AUTO: 36.3 PG (ref 27–32.7)
MCHC RBC AUTO-ENTMCNC: 33 G/DL (ref 32.6–36.4)
MCHC RBC AUTO-ENTMCNC: 33.1 G/DL (ref 32.6–36.4)
MCHC RBC AUTO-ENTMCNC: 33.1 G/DL (ref 32.6–36.4)
MCHC RBC AUTO-ENTMCNC: 33.2 G/DL (ref 32.6–36.4)
MCHC RBC AUTO-ENTMCNC: 33.4 G/DL (ref 32.6–36.4)
MCHC RBC AUTO-ENTMCNC: 33.5 G/DL (ref 32.6–36.4)
MCHC RBC AUTO-ENTMCNC: 33.6 G/DL (ref 32.6–36.4)
MCHC RBC AUTO-ENTMCNC: 33.7 G/DL (ref 32.6–36.4)
MCHC RBC AUTO-ENTMCNC: 33.8 G/DL (ref 32.6–36.4)
MCHC RBC AUTO-ENTMCNC: 33.9 G/DL (ref 32.6–36.4)
MCHC RBC AUTO-ENTMCNC: 34.1 G/DL (ref 32.6–36.4)
MCHC RBC AUTO-ENTMCNC: 34.1 G/DL (ref 32.6–36.4)
MCHC RBC AUTO-ENTMCNC: 34.4 G/DL (ref 32.6–36.4)
MCHC RBC AUTO-ENTMCNC: 35 G/DL (ref 32.6–36.4)
MCV RBC AUTO: 103.1 FL (ref 79.8–96.2)
MCV RBC AUTO: 103.6 FL (ref 79.8–96.2)
MCV RBC AUTO: 104.1 FL (ref 79.8–96.2)
MCV RBC AUTO: 104.4 FL (ref 79.8–96.2)
MCV RBC AUTO: 104.4 FL (ref 79.8–96.2)
MCV RBC AUTO: 104.6 FL (ref 79.8–96.2)
MCV RBC AUTO: 104.7 FL (ref 79.8–96.2)
MCV RBC AUTO: 105.3 FL (ref 79.8–96.2)
MCV RBC AUTO: 105.9 FL (ref 79.8–96.2)
MCV RBC AUTO: 106 FL (ref 79.8–96.2)
MCV RBC AUTO: 106.6 FL (ref 79.8–96.2)
MCV RBC AUTO: 106.9 FL (ref 79.8–96.2)
MCV RBC AUTO: 106.9 FL (ref 79.8–96.2)
MCV RBC AUTO: 107.8 FL (ref 79.8–96.2)
MONOCYTES # BLD AUTO: 0.23 10*3/MM3 (ref 0.2–1.2)
MONOCYTES # BLD AUTO: 0.33 10*3/MM3 (ref 0.2–1.2)
MONOCYTES # BLD AUTO: 0.34 10*3/MM3 (ref 0.2–1.2)
MONOCYTES # BLD AUTO: 0.35 10*3/MM3 (ref 0.2–1.2)
MONOCYTES # BLD AUTO: 0.45 10*3/MM3 (ref 0.2–1.2)
MONOCYTES # BLD AUTO: 0.54 10*3/MM3 (ref 0.2–1.2)
MONOCYTES # BLD AUTO: 0.6 10*3/MM3 (ref 0.2–1.2)
MONOCYTES # BLD AUTO: 0.62 10*3/MM3 (ref 0.2–1.2)
MONOCYTES # BLD AUTO: 0.66 10*3/MM3 (ref 0.2–1.2)
MONOCYTES # BLD AUTO: 0.67 10*3/MM3 (ref 0.2–1.2)
MONOCYTES # BLD AUTO: 0.68 10*3/MM3 (ref 0.2–1.2)
MONOCYTES # BLD AUTO: 0.7 10*3/MM3 (ref 0.2–1.2)
MONOCYTES # BLD AUTO: 0.88 10*3/MM3 (ref 0.2–1.2)
MONOCYTES # BLD AUTO: 1.05 10*3/MM3 (ref 0.2–1.2)
MONOCYTES NFR BLD AUTO: 10.5 % (ref 5–12)
MONOCYTES NFR BLD AUTO: 12.9 % (ref 5–12)
MONOCYTES NFR BLD AUTO: 13.2 % (ref 5–12)
MONOCYTES NFR BLD AUTO: 14 % (ref 5–12)
MONOCYTES NFR BLD AUTO: 14.4 % (ref 5–12)
MONOCYTES NFR BLD AUTO: 2.6 % (ref 5–12)
MONOCYTES NFR BLD AUTO: 4 % (ref 5–12)
MONOCYTES NFR BLD AUTO: 4 % (ref 5–12)
MONOCYTES NFR BLD AUTO: 5.9 % (ref 5–12)
MONOCYTES NFR BLD AUTO: 6.1 % (ref 5–12)
MONOCYTES NFR BLD AUTO: 6.6 % (ref 5–12)
MONOCYTES NFR BLD AUTO: 7.8 % (ref 5–12)
MONOCYTES NFR BLD AUTO: 8.4 % (ref 5–12)
MONOCYTES NFR BLD AUTO: 9.2 % (ref 5–12)
MONOCYTES NFR FLD: 36 %
MONOCYTES NFR FLD: 37 %
MONOCYTES NFR FLD: 44 %
MONOS+MACROS NFR FLD: 19 %
MONOS+MACROS NFR FLD: 19 %
NEUTROPHILS # BLD AUTO: 12.93 10*3/MM3 (ref 1.9–8.1)
NEUTROPHILS # BLD AUTO: 2.32 10*3/MM3 (ref 1.9–8.1)
NEUTROPHILS # BLD AUTO: 2.5 10*3/MM3 (ref 1.9–8.1)
NEUTROPHILS # BLD AUTO: 3.07 10*3/MM3 (ref 1.9–8.1)
NEUTROPHILS # BLD AUTO: 3.46 10*3/MM3 (ref 1.9–8.1)
NEUTROPHILS # BLD AUTO: 4.03 10*3/MM3 (ref 1.9–8.1)
NEUTROPHILS # BLD AUTO: 5.32 10*3/MM3 (ref 1.9–8.1)
NEUTROPHILS # BLD AUTO: 7.01 10*3/MM3 (ref 1.9–8.1)
NEUTROPHILS # BLD AUTO: 7.56 10*3/MM3 (ref 1.9–8.1)
NEUTROPHILS # BLD AUTO: 7.77 10*3/MM3 (ref 1.9–8.1)
NEUTROPHILS # BLD AUTO: 8.24 10*3/MM3 (ref 1.9–8.1)
NEUTROPHILS # BLD AUTO: 8.26 10*3/MM3 (ref 1.9–8.1)
NEUTROPHILS # BLD AUTO: 8.44 10*3/MM3 (ref 1.9–8.1)
NEUTROPHILS # BLD AUTO: 8.47 10*3/MM3 (ref 1.9–8.1)
NEUTROPHILS NFR BLD AUTO: 66.5 % (ref 42.7–76)
NEUTROPHILS NFR BLD AUTO: 68.3 % (ref 42.7–76)
NEUTROPHILS NFR BLD AUTO: 71 % (ref 42.7–76)
NEUTROPHILS NFR BLD AUTO: 74.5 % (ref 42.7–76)
NEUTROPHILS NFR BLD AUTO: 77.9 % (ref 42.7–76)
NEUTROPHILS NFR BLD AUTO: 78.8 % (ref 42.7–76)
NEUTROPHILS NFR BLD AUTO: 81.1 % (ref 42.7–76)
NEUTROPHILS NFR BLD AUTO: 82.8 % (ref 42.7–76)
NEUTROPHILS NFR BLD AUTO: 84.3 % (ref 42.7–76)
NEUTROPHILS NFR BLD AUTO: 85.4 % (ref 42.7–76)
NEUTROPHILS NFR BLD AUTO: 86.6 % (ref 42.7–76)
NEUTROPHILS NFR BLD AUTO: 87.9 % (ref 42.7–76)
NEUTROPHILS NFR BLD AUTO: 88.4 % (ref 42.7–76)
NEUTROPHILS NFR BLD AUTO: 93 % (ref 42.7–76)
NEUTROPHILS NFR FLD MANUAL: 3 %
NEUTROPHILS NFR FLD MANUAL: 3 %
NEUTROPHILS NFR FLD MANUAL: 7 %
NITRITE UR QL STRIP: POSITIVE
NRBC BLD MANUAL-RTO: 0 /100 WBC (ref 0–0)
OVALOCYTES BLD QL SMEAR: NORMAL
OVALOCYTES BLD QL SMEAR: NORMAL
PATH REPORT.FINAL DX SPEC: NORMAL
PATH REPORT.GROSS SPEC: NORMAL
PH UR STRIP.AUTO: <=5 [PH] (ref 5–8)
PLAT MORPH BLD: NORMAL
PLATELET # BLD AUTO: 104 10*3/MM3 (ref 140–500)
PLATELET # BLD AUTO: 129 10*3/MM3 (ref 140–500)
PLATELET # BLD AUTO: 143 10*3/MM3 (ref 140–500)
PLATELET # BLD AUTO: 148 10*3/MM3 (ref 140–500)
PLATELET # BLD AUTO: 148 10*3/MM3 (ref 140–500)
PLATELET # BLD AUTO: 150 10*3/MM3 (ref 140–500)
PLATELET # BLD AUTO: 155 10*3/MM3 (ref 140–500)
PLATELET # BLD AUTO: 157 10*3/MM3 (ref 140–500)
PLATELET # BLD AUTO: 158 10*3/MM3 (ref 140–500)
PLATELET # BLD AUTO: 163 10*3/MM3 (ref 140–500)
PLATELET # BLD AUTO: 165 10*3/MM3 (ref 140–500)
PLATELET # BLD AUTO: 174 10*3/MM3 (ref 140–500)
PLATELET # BLD AUTO: 182 10*3/MM3 (ref 140–500)
PLATELET # BLD AUTO: 237 10*3/MM3 (ref 140–500)
PMV BLD AUTO: 11.5 FL (ref 6–12)
PMV BLD AUTO: 12 FL (ref 6–12)
PMV BLD AUTO: 12.4 FL (ref 6–12)
PMV BLD AUTO: 12.4 FL (ref 6–12)
PMV BLD AUTO: 12.5 FL (ref 6–12)
PMV BLD AUTO: 12.6 FL (ref 6–12)
PMV BLD AUTO: 12.6 FL (ref 6–12)
PMV BLD AUTO: 12.7 FL (ref 6–12)
PMV BLD AUTO: 12.8 FL (ref 6–12)
PMV BLD AUTO: 12.9 FL (ref 6–12)
PMV BLD AUTO: 13 FL (ref 6–12)
PMV BLD AUTO: 13.2 FL (ref 6–12)
PMV BLD AUTO: 13.2 FL (ref 6–12)
PMV BLD AUTO: 13.3 FL (ref 6–12)
POTASSIUM BLD-SCNC: 3.3 MMOL/L (ref 3.5–5.2)
POTASSIUM BLD-SCNC: 3.5 MMOL/L (ref 3.5–5.2)
POTASSIUM BLD-SCNC: 3.6 MMOL/L (ref 3.5–5.2)
POTASSIUM BLD-SCNC: 3.6 MMOL/L (ref 3.5–5.2)
POTASSIUM BLD-SCNC: 3.7 MMOL/L (ref 3.5–5.2)
POTASSIUM BLD-SCNC: 3.8 MMOL/L (ref 3.5–5.2)
POTASSIUM BLD-SCNC: 3.9 MMOL/L (ref 3.5–5.2)
POTASSIUM BLD-SCNC: 4 MMOL/L (ref 3.5–5.2)
POTASSIUM BLD-SCNC: 4.1 MMOL/L (ref 3.5–5.2)
POTASSIUM BLD-SCNC: 4.3 MMOL/L (ref 3.5–5.2)
POTASSIUM BLD-SCNC: 4.5 MMOL/L (ref 3.5–5.2)
POTASSIUM BLD-SCNC: 4.6 MMOL/L (ref 3.5–5.2)
POTASSIUM BLD-SCNC: 4.8 MMOL/L (ref 3.5–5.2)
POTASSIUM BLD-SCNC: 5 MMOL/L (ref 3.5–5.2)
PROT FLD-MCNC: 1.3 G/DL
PROT FLD-MCNC: <1 G/DL
PROT SERPL-MCNC: 6.2 G/DL (ref 6–8.5)
PROT SERPL-MCNC: 6.4 G/DL (ref 6–8.5)
PROT SERPL-MCNC: 6.5 G/DL (ref 6–8.5)
PROT SERPL-MCNC: 6.6 G/DL (ref 6–8.5)
PROT SERPL-MCNC: 6.6 G/DL (ref 6–8.5)
PROT SERPL-MCNC: 6.7 G/DL (ref 6–8.5)
PROT SERPL-MCNC: 6.7 G/DL (ref 6–8.5)
PROT UR QL STRIP: ABNORMAL
PROTHROMBIN TIME: 13 SECONDS (ref 12.8–15.2)
PROTHROMBIN TIME: 14.3 SECONDS (ref 12.8–15.2)
PROTHROMBIN TIME: 14.9 SECONDS (ref 11.7–14.2)
PROTHROMBIN TIME: 15.7 SECONDS (ref 11.7–14.2)
PROTHROMBIN TIME: 16.2 SECONDS (ref 11.7–14.2)
RBC # BLD AUTO: 2.89 10*6/MM3 (ref 4.6–6)
RBC # BLD AUTO: 3.04 10*6/MM3 (ref 4.6–6)
RBC # BLD AUTO: 3.05 10*6/MM3 (ref 4.6–6)
RBC # BLD AUTO: 3.15 10*6/MM3 (ref 4.6–6)
RBC # BLD AUTO: 3.17 10*6/MM3 (ref 4.6–6)
RBC # BLD AUTO: 3.19 10*6/MM3 (ref 4.6–6)
RBC # BLD AUTO: 3.22 10*6/MM3 (ref 4.6–6)
RBC # BLD AUTO: 3.22 10*6/MM3 (ref 4.6–6)
RBC # BLD AUTO: 3.25 10*6/MM3 (ref 4.6–6)
RBC # BLD AUTO: 3.31 10*6/MM3 (ref 4.6–6)
RBC # BLD AUTO: 3.4 10*6/MM3 (ref 4.6–6)
RBC # BLD AUTO: 3.42 10*6/MM3 (ref 4.6–6)
RBC # BLD AUTO: 3.58 10*6/MM3 (ref 4.6–6)
RBC # BLD AUTO: 3.65 10*6/MM3 (ref 4.6–6)
RBC # FLD AUTO: 23 /MM3
RBC # FLD AUTO: 54 /MM3
RBC # FLD AUTO: 9 /MM3
RBC # UR: ABNORMAL /HPF
REF LAB TEST METHOD: ABNORMAL
SODIUM BLD-SCNC: 130 MMOL/L (ref 136–145)
SODIUM BLD-SCNC: 130 MMOL/L (ref 136–145)
SODIUM BLD-SCNC: 132 MMOL/L (ref 136–145)
SODIUM BLD-SCNC: 132 MMOL/L (ref 136–145)
SODIUM BLD-SCNC: 133 MMOL/L (ref 136–145)
SODIUM BLD-SCNC: 134 MMOL/L (ref 136–145)
SODIUM BLD-SCNC: 135 MMOL/L (ref 136–145)
SODIUM BLD-SCNC: 135 MMOL/L (ref 136–145)
SODIUM BLD-SCNC: 137 MMOL/L (ref 136–145)
SODIUM BLD-SCNC: 139 MMOL/L (ref 136–145)
SODIUM BLD-SCNC: 141 MMOL/L (ref 136–145)
SODIUM BLD-SCNC: 141 MMOL/L (ref 136–145)
SODIUM BLD-SCNC: 142 MMOL/L (ref 136–145)
SODIUM BLD-SCNC: 143 MMOL/L (ref 136–145)
SP GR UR STRIP: 1.03 (ref 1–1.03)
SPHEROCYTES BLD QL SMEAR: NORMAL
SQUAMOUS #/AREA URNS HPF: ABNORMAL /HPF
TARGETS BLD QL SMEAR: NORMAL
UROBILINOGEN UR QL STRIP: ABNORMAL
WBC # FLD: 22 /MM3
WBC # FLD: 45 /MM3
WBC # FLD: 59 /MM3
WBC MORPH BLD: NORMAL
WBC NRBC COR # BLD: 10.01 10*3/MM3 (ref 4.5–10.7)
WBC NRBC COR # BLD: 14.92 10*3/MM3 (ref 4.5–10.7)
WBC NRBC COR # BLD: 3.4 10*3/MM3 (ref 4.5–10.7)
WBC NRBC COR # BLD: 3.76 10*3/MM3 (ref 4.5–10.7)
WBC NRBC COR # BLD: 3.94 10*3/MM3 (ref 4.5–10.7)
WBC NRBC COR # BLD: 4.87 10*3/MM3 (ref 4.5–10.7)
WBC NRBC COR # BLD: 5.41 10*3/MM3 (ref 4.5–10.7)
WBC NRBC COR # BLD: 6.76 10*3/MM3 (ref 4.5–10.7)
WBC NRBC COR # BLD: 8.55 10*3/MM3 (ref 4.5–10.7)
WBC NRBC COR # BLD: 8.64 10*3/MM3 (ref 4.5–10.7)
WBC NRBC COR # BLD: 8.84 10*3/MM3 (ref 4.5–10.7)
WBC NRBC COR # BLD: 8.88 10*3/MM3 (ref 4.5–10.7)
WBC NRBC COR # BLD: 9.91 10*3/MM3 (ref 4.5–10.7)
WBC NRBC COR # BLD: 9.96 10*3/MM3 (ref 4.5–10.7)
WBC UR QL AUTO: ABNORMAL /HPF

## 2018-01-01 PROCEDURE — 63710000001 PREDNISONE PER 1 MG: Performed by: INTERNAL MEDICINE

## 2018-01-01 PROCEDURE — 85610 PROTHROMBIN TIME: CPT | Performed by: INTERNAL MEDICINE

## 2018-01-01 PROCEDURE — 0W9G30Z DRAINAGE OF PERITONEAL CAVITY WITH DRAINAGE DEVICE, PERCUTANEOUS APPROACH: ICD-10-PCS | Performed by: RADIOLOGY

## 2018-01-01 PROCEDURE — 80053 COMPREHEN METABOLIC PANEL: CPT | Performed by: INTERNAL MEDICINE

## 2018-01-01 PROCEDURE — 85025 COMPLETE CBC W/AUTO DIFF WBC: CPT | Performed by: INTERNAL MEDICINE

## 2018-01-01 PROCEDURE — 87040 BLOOD CULTURE FOR BACTERIA: CPT | Performed by: PHYSICIAN ASSISTANT

## 2018-01-01 PROCEDURE — 82565 ASSAY OF CREATININE: CPT

## 2018-01-01 PROCEDURE — 80048 BASIC METABOLIC PNL TOTAL CA: CPT | Performed by: UROLOGY

## 2018-01-01 PROCEDURE — 25010000002 HYDROMORPHONE HCL PF 500 MG/50ML SOLUTION: Performed by: INTERNAL MEDICINE

## 2018-01-01 PROCEDURE — 80053 COMPREHEN METABOLIC PANEL: CPT | Performed by: PHYSICIAN ASSISTANT

## 2018-01-01 PROCEDURE — 25010000002 HYDROMORPHONE PER 4 MG: Performed by: INTERNAL MEDICINE

## 2018-01-01 PROCEDURE — 96375 TX/PRO/DX INJ NEW DRUG ADDON: CPT | Performed by: INTERNAL MEDICINE

## 2018-01-01 PROCEDURE — 76942 ECHO GUIDE FOR BIOPSY: CPT

## 2018-01-01 PROCEDURE — 96413 CHEMO IV INFUSION 1 HR: CPT | Performed by: NURSE PRACTITIONER

## 2018-01-01 PROCEDURE — 25010000002 CEFTRIAXONE PER 250 MG: Performed by: PHYSICIAN ASSISTANT

## 2018-01-01 PROCEDURE — 99232 SBSQ HOSP IP/OBS MODERATE 35: CPT | Performed by: INTERNAL MEDICINE

## 2018-01-01 PROCEDURE — 99215 OFFICE O/P EST HI 40 MIN: CPT | Performed by: INTERNAL MEDICINE

## 2018-01-01 PROCEDURE — 86301 IMMUNOASSAY TUMOR CA 19-9: CPT | Performed by: INTERNAL MEDICINE

## 2018-01-01 PROCEDURE — 74177 CT ABD & PELVIS W/CONTRAST: CPT

## 2018-01-01 PROCEDURE — 89051 BODY FLUID CELL COUNT: CPT | Performed by: INTERNAL MEDICINE

## 2018-01-01 PROCEDURE — 96413 CHEMO IV INFUSION 1 HR: CPT

## 2018-01-01 PROCEDURE — 80053 COMPREHEN METABOLIC PANEL: CPT | Performed by: EMERGENCY MEDICINE

## 2018-01-01 PROCEDURE — 85730 THROMBOPLASTIN TIME PARTIAL: CPT | Performed by: INTERNAL MEDICINE

## 2018-01-01 PROCEDURE — 36415 COLL VENOUS BLD VENIPUNCTURE: CPT | Performed by: INTERNAL MEDICINE

## 2018-01-01 PROCEDURE — BT141ZZ FLUOROSCOPY OF KIDNEYS, URETERS AND BLADDER USING LOW OSMOLAR CONTRAST: ICD-10-PCS | Performed by: UROLOGY

## 2018-01-01 PROCEDURE — 94799 UNLISTED PULMONARY SVC/PX: CPT

## 2018-01-01 PROCEDURE — 25010000002 CEFTRIAXONE PER 250 MG: Performed by: INTERNAL MEDICINE

## 2018-01-01 PROCEDURE — 0T9B70Z DRAINAGE OF BLADDER WITH DRAINAGE DEVICE, VIA NATURAL OR ARTIFICIAL OPENING: ICD-10-PCS | Performed by: UROLOGY

## 2018-01-01 PROCEDURE — 85025 COMPLETE CBC W/AUTO DIFF WBC: CPT | Performed by: PHYSICIAN ASSISTANT

## 2018-01-01 PROCEDURE — 99214 OFFICE O/P EST MOD 30 MIN: CPT | Performed by: INTERNAL MEDICINE

## 2018-01-01 PROCEDURE — C2617 STENT, NON-COR, TEM W/O DEL: HCPCS | Performed by: UROLOGY

## 2018-01-01 PROCEDURE — 99231 SBSQ HOSP IP/OBS SF/LOW 25: CPT | Performed by: INTERNAL MEDICINE

## 2018-01-01 PROCEDURE — 25010000002 DEXAMETHASONE PER 1 MG: Performed by: INTERNAL MEDICINE

## 2018-01-01 PROCEDURE — 81001 URINALYSIS AUTO W/SCOPE: CPT | Performed by: PHYSICIAN ASSISTANT

## 2018-01-01 PROCEDURE — 0T788DZ DILATION OF BILATERAL URETERS WITH INTRALUMINAL DEVICE, VIA NATURAL OR ARTIFICIAL OPENING ENDOSCOPIC: ICD-10-PCS | Performed by: UROLOGY

## 2018-01-01 PROCEDURE — 85007 BL SMEAR W/DIFF WBC COUNT: CPT | Performed by: INTERNAL MEDICINE

## 2018-01-01 PROCEDURE — 85025 COMPLETE CBC W/AUTO DIFF WBC: CPT | Performed by: UROLOGY

## 2018-01-01 PROCEDURE — 99213 OFFICE O/P EST LOW 20 MIN: CPT | Performed by: INTERNAL MEDICINE

## 2018-01-01 PROCEDURE — 25010000002 HEPARIN FLUSH (PORCINE) 100 UNIT/ML SOLUTION: Performed by: INTERNAL MEDICINE

## 2018-01-01 PROCEDURE — 99214 OFFICE O/P EST MOD 30 MIN: CPT | Performed by: NURSE PRACTITIONER

## 2018-01-01 PROCEDURE — 96523 IRRIG DRUG DELIVERY DEVICE: CPT | Performed by: INTERNAL MEDICINE

## 2018-01-01 PROCEDURE — 99233 SBSQ HOSP IP/OBS HIGH 50: CPT | Performed by: INTERNAL MEDICINE

## 2018-01-01 PROCEDURE — 96375 TX/PRO/DX INJ NEW DRUG ADDON: CPT

## 2018-01-01 PROCEDURE — 96415 CHEMO IV INFUSION ADDL HR: CPT

## 2018-01-01 PROCEDURE — 25010000002 OXALIPLATIN PER 0.5 MG: Performed by: INTERNAL MEDICINE

## 2018-01-01 PROCEDURE — 25010000002 LORAZEPAM PER 2 MG: Performed by: INTERNAL MEDICINE

## 2018-01-01 PROCEDURE — 88112 CYTOPATH CELL ENHANCE TECH: CPT | Performed by: INTERNAL MEDICINE

## 2018-01-01 PROCEDURE — 84157 ASSAY OF PROTEIN OTHER: CPT | Performed by: INTERNAL MEDICINE

## 2018-01-01 PROCEDURE — 36415 COLL VENOUS BLD VENIPUNCTURE: CPT | Performed by: PHYSICIAN ASSISTANT

## 2018-01-01 PROCEDURE — 25010000002 DEXAMETHASONE SODIUM PHOSPHATE 10 MG/ML SOLUTION 1 ML VIAL: Performed by: NURSE PRACTITIONER

## 2018-01-01 PROCEDURE — 87205 SMEAR GRAM STAIN: CPT | Performed by: INTERNAL MEDICINE

## 2018-01-01 PROCEDURE — 96415 CHEMO IV INFUSION ADDL HR: CPT | Performed by: INTERNAL MEDICINE

## 2018-01-01 PROCEDURE — 25010000002 ONDANSETRON PER 1 MG: Performed by: PHYSICIAN ASSISTANT

## 2018-01-01 PROCEDURE — 25010000002 MIDAZOLAM PER 1 MG: Performed by: ANESTHESIOLOGY

## 2018-01-01 PROCEDURE — 49406 IMAGE CATH FLUID PERI/RETRO: CPT

## 2018-01-01 PROCEDURE — 25010000002 FENTANYL CITRATE (PF) 100 MCG/2ML SOLUTION

## 2018-01-01 PROCEDURE — 99285 EMERGENCY DEPT VISIT HI MDM: CPT

## 2018-01-01 PROCEDURE — 85025 COMPLETE CBC W/AUTO DIFF WBC: CPT | Performed by: EMERGENCY MEDICINE

## 2018-01-01 PROCEDURE — 0 IOPAMIDOL 61 % SOLUTION: Performed by: INTERNAL MEDICINE

## 2018-01-01 PROCEDURE — 25010000002 FENTANYL CITRATE (PF) 100 MCG/2ML SOLUTION: Performed by: ANESTHESIOLOGY

## 2018-01-01 PROCEDURE — 25010000002 ONDANSETRON PER 1 MG: Performed by: ANESTHESIOLOGY

## 2018-01-01 PROCEDURE — 96413 CHEMO IV INFUSION 1 HR: CPT | Performed by: INTERNAL MEDICINE

## 2018-01-01 PROCEDURE — 83735 ASSAY OF MAGNESIUM: CPT | Performed by: INTERNAL MEDICINE

## 2018-01-01 PROCEDURE — 0TF68ZZ FRAGMENTATION IN RIGHT URETER, VIA NATURAL OR ARTIFICIAL OPENING ENDOSCOPIC: ICD-10-PCS | Performed by: UROLOGY

## 2018-01-01 PROCEDURE — 83605 ASSAY OF LACTIC ACID: CPT | Performed by: PHYSICIAN ASSISTANT

## 2018-01-01 PROCEDURE — 74176 CT ABD & PELVIS W/O CONTRAST: CPT

## 2018-01-01 PROCEDURE — 85007 BL SMEAR W/DIFF WBC COUNT: CPT | Performed by: UROLOGY

## 2018-01-01 PROCEDURE — 74420 UROGRAPHY RTRGR +-KUB: CPT

## 2018-01-01 PROCEDURE — 0 DIATRIZOATE MEGLUMINE & SODIUM PER 1 ML: Performed by: INTERNAL MEDICINE

## 2018-01-01 PROCEDURE — 83615 LACTATE (LD) (LDH) ENZYME: CPT | Performed by: INTERNAL MEDICINE

## 2018-01-01 PROCEDURE — C1758 CATHETER, URETERAL: HCPCS | Performed by: UROLOGY

## 2018-01-01 PROCEDURE — 96415 CHEMO IV INFUSION ADDL HR: CPT | Performed by: NURSE PRACTITIONER

## 2018-01-01 PROCEDURE — 25010000002 PALONOSETRON PER 25 MCG: Performed by: INTERNAL MEDICINE

## 2018-01-01 PROCEDURE — 25010000002 HYDROMORPHONE PER 4 MG: Performed by: ANESTHESIOLOGY

## 2018-01-01 PROCEDURE — 99223 1ST HOSP IP/OBS HIGH 75: CPT | Performed by: INTERNAL MEDICINE

## 2018-01-01 PROCEDURE — 0 IOTHALAMATE 60 % SOLUTION: Performed by: UROLOGY

## 2018-01-01 PROCEDURE — C1769 GUIDE WIRE: HCPCS | Performed by: UROLOGY

## 2018-01-01 PROCEDURE — 25010000002 PROPOFOL 10 MG/ML EMULSION: Performed by: ANESTHESIOLOGY

## 2018-01-01 PROCEDURE — 25010000002 PALONOSETRON PER 25 MCG: Performed by: NURSE PRACTITIONER

## 2018-01-01 PROCEDURE — 25010000002 METHYLPREDNISOLONE PER 40 MG: Performed by: INTERNAL MEDICINE

## 2018-01-01 PROCEDURE — 25010000002 OXALIPLATIN PER 0.5 MG: Performed by: NURSE PRACTITIONER

## 2018-01-01 PROCEDURE — 96375 TX/PRO/DX INJ NEW DRUG ADDON: CPT | Performed by: NURSE PRACTITIONER

## 2018-01-01 DEVICE — URETERAL STENT
Type: IMPLANTABLE DEVICE | Site: URETER | Status: FUNCTIONAL
Brand: PERCUFLEX™ PLUS

## 2018-01-01 RX ORDER — LIDOCAINE HYDROCHLORIDE 20 MG/ML
INJECTION, SOLUTION INFILTRATION; PERINEURAL AS NEEDED
Status: DISCONTINUED | OUTPATIENT
Start: 2018-01-01 | End: 2018-01-01 | Stop reason: SURG

## 2018-01-01 RX ORDER — DOCUSATE SODIUM 100 MG/1
100 CAPSULE, LIQUID FILLED ORAL 2 TIMES DAILY
Status: DISCONTINUED | OUTPATIENT
Start: 2018-01-01 | End: 2018-01-01

## 2018-01-01 RX ORDER — DEXTROSE MONOHYDRATE 50 MG/ML
250 INJECTION, SOLUTION INTRAVENOUS ONCE
Status: CANCELLED | OUTPATIENT
Start: 2018-01-01

## 2018-01-01 RX ORDER — MORPHINE SULFATE 20 MG/ML
5 SOLUTION ORAL
Status: DISCONTINUED | OUTPATIENT
Start: 2018-01-01 | End: 2018-01-01 | Stop reason: HOSPADM

## 2018-01-01 RX ORDER — LORAZEPAM 2 MG/ML
1 INJECTION INTRAMUSCULAR
Status: DISCONTINUED | OUTPATIENT
Start: 2018-01-01 | End: 2018-01-01 | Stop reason: HOSPADM

## 2018-01-01 RX ORDER — PROMETHAZINE HYDROCHLORIDE 25 MG/1
12.5 TABLET ORAL ONCE AS NEEDED
Status: DISCONTINUED | OUTPATIENT
Start: 2018-01-01 | End: 2018-01-01 | Stop reason: HOSPADM

## 2018-01-01 RX ORDER — DIPHENOXYLATE HYDROCHLORIDE AND ATROPINE SULFATE 2.5; .025 MG/1; MG/1
1 TABLET ORAL
Status: DISCONTINUED | OUTPATIENT
Start: 2018-01-01 | End: 2018-01-01 | Stop reason: HOSPADM

## 2018-01-01 RX ORDER — LORAZEPAM 2 MG/ML
2 INJECTION INTRAMUSCULAR
Status: DISCONTINUED | OUTPATIENT
Start: 2018-01-01 | End: 2018-01-01 | Stop reason: HOSPADM

## 2018-01-01 RX ORDER — ACETAMINOPHEN 325 MG/1
650 TABLET ORAL EVERY 4 HOURS PRN
Status: DISCONTINUED | OUTPATIENT
Start: 2018-01-01 | End: 2018-01-01 | Stop reason: HOSPADM

## 2018-01-01 RX ORDER — ACETAMINOPHEN 325 MG/1
650 TABLET ORAL EVERY 4 HOURS PRN
Status: CANCELLED | OUTPATIENT
Start: 2018-01-01

## 2018-01-01 RX ORDER — DEXTROSE MONOHYDRATE 50 MG/ML
250 INJECTION, SOLUTION INTRAVENOUS ONCE
Status: COMPLETED | OUTPATIENT
Start: 2018-01-01 | End: 2018-01-01

## 2018-01-01 RX ORDER — MORPHINE SULFATE 20 MG/ML
5 SOLUTION ORAL
Status: ACTIVE | OUTPATIENT
Start: 2018-01-01 | End: 2018-01-01

## 2018-01-01 RX ORDER — GLYCOPYRROLATE 0.2 MG/ML
0.2 INJECTION INTRAMUSCULAR; INTRAVENOUS
Status: DISCONTINUED | OUTPATIENT
Start: 2018-01-01 | End: 2018-01-01 | Stop reason: HOSPADM

## 2018-01-01 RX ORDER — CEFTRIAXONE SODIUM 1 G/50ML
1 INJECTION, SOLUTION INTRAVENOUS ONCE
Status: COMPLETED | OUTPATIENT
Start: 2018-01-01 | End: 2018-01-01

## 2018-01-01 RX ORDER — LIDOCAINE HYDROCHLORIDE 10 MG/ML
20 INJECTION, SOLUTION INFILTRATION; PERINEURAL ONCE
Status: COMPLETED | OUTPATIENT
Start: 2018-01-01 | End: 2018-01-01

## 2018-01-01 RX ORDER — PROMETHAZINE HYDROCHLORIDE 25 MG/1
25 TABLET ORAL ONCE AS NEEDED
Status: DISCONTINUED | OUTPATIENT
Start: 2018-01-01 | End: 2018-01-01 | Stop reason: HOSPADM

## 2018-01-01 RX ORDER — PALONOSETRON 0.05 MG/ML
0.25 INJECTION, SOLUTION INTRAVENOUS ONCE
Status: COMPLETED | OUTPATIENT
Start: 2018-01-01 | End: 2018-01-01

## 2018-01-01 RX ORDER — LORAZEPAM 2 MG/ML
2 CONCENTRATE ORAL
Status: DISCONTINUED | OUTPATIENT
Start: 2018-01-01 | End: 2018-01-01 | Stop reason: HOSPADM

## 2018-01-01 RX ORDER — LORAZEPAM 2 MG/ML
0.5 CONCENTRATE ORAL
Status: DISCONTINUED | OUTPATIENT
Start: 2018-01-01 | End: 2018-01-01 | Stop reason: HOSPADM

## 2018-01-01 RX ORDER — MIDAZOLAM HYDROCHLORIDE 1 MG/ML
2 INJECTION INTRAMUSCULAR; INTRAVENOUS
Status: DISCONTINUED | OUTPATIENT
Start: 2018-01-01 | End: 2018-01-01 | Stop reason: HOSPADM

## 2018-01-01 RX ORDER — FENTANYL CITRATE 50 UG/ML
INJECTION, SOLUTION INTRAMUSCULAR; INTRAVENOUS AS NEEDED
Status: DISCONTINUED | OUTPATIENT
Start: 2018-01-01 | End: 2018-01-01 | Stop reason: SURG

## 2018-01-01 RX ORDER — PREDNISONE 20 MG/1
20 TABLET ORAL DAILY
Status: DISCONTINUED | OUTPATIENT
Start: 2018-01-01 | End: 2018-01-01 | Stop reason: HOSPADM

## 2018-01-01 RX ORDER — KETOROLAC TROMETHAMINE 10 MG/1
10 TABLET, FILM COATED ORAL EVERY 6 HOURS PRN
Qty: 20 TABLET | Refills: 0 | Status: SHIPPED | OUTPATIENT
Start: 2018-01-01

## 2018-01-01 RX ORDER — PALONOSETRON 0.05 MG/ML
0.25 INJECTION, SOLUTION INTRAVENOUS ONCE
Status: CANCELLED | OUTPATIENT
Start: 2018-01-01

## 2018-01-01 RX ORDER — SODIUM CHLORIDE 0.9 % (FLUSH) 0.9 %
1-10 SYRINGE (ML) INJECTION AS NEEDED
Status: DISCONTINUED | OUTPATIENT
Start: 2018-01-01 | End: 2018-01-01 | Stop reason: HOSPADM

## 2018-01-01 RX ORDER — DIPHENOXYLATE HYDROCHLORIDE AND ATROPINE SULFATE 2.5; .025 MG/1; MG/1
1 TABLET ORAL DAILY
Status: CANCELLED | OUTPATIENT
Start: 2018-01-01

## 2018-01-01 RX ORDER — TAMSULOSIN HYDROCHLORIDE 0.4 MG/1
0.4 CAPSULE ORAL NIGHTLY
Status: CANCELLED | OUTPATIENT
Start: 2018-01-01

## 2018-01-01 RX ORDER — OXYCODONE HYDROCHLORIDE 15 MG/1
30 TABLET ORAL EVERY 4 HOURS
Status: DISCONTINUED | OUTPATIENT
Start: 2018-01-01 | End: 2018-01-01

## 2018-01-01 RX ORDER — TAMSULOSIN HYDROCHLORIDE 0.4 MG/1
0.4 CAPSULE ORAL NIGHTLY
Status: DISCONTINUED | OUTPATIENT
Start: 2018-01-01 | End: 2018-01-01

## 2018-01-01 RX ORDER — GLYCOPYRROLATE 0.2 MG/ML
0.2 INJECTION INTRAMUSCULAR; INTRAVENOUS
Status: CANCELLED | OUTPATIENT
Start: 2018-01-01

## 2018-01-01 RX ORDER — DIPHENOXYLATE HYDROCHLORIDE AND ATROPINE SULFATE 2.5; .025 MG/1; MG/1
1 TABLET ORAL DAILY
Status: DISCONTINUED | OUTPATIENT
Start: 2018-01-01 | End: 2018-01-01 | Stop reason: HOSPADM

## 2018-01-01 RX ORDER — GLYCOPYRROLATE 0.2 MG/ML
0.4 INJECTION INTRAMUSCULAR; INTRAVENOUS
Status: DISCONTINUED | OUTPATIENT
Start: 2018-01-01 | End: 2018-01-01 | Stop reason: HOSPADM

## 2018-01-01 RX ORDER — TAMSULOSIN HYDROCHLORIDE 0.4 MG/1
1 CAPSULE ORAL NIGHTLY
Qty: 10 CAPSULE | Refills: 0 | Status: SHIPPED | OUTPATIENT
Start: 2018-01-01

## 2018-01-01 RX ORDER — LABETALOL HYDROCHLORIDE 5 MG/ML
5 INJECTION, SOLUTION INTRAVENOUS
Status: DISCONTINUED | OUTPATIENT
Start: 2018-01-01 | End: 2018-01-01 | Stop reason: HOSPADM

## 2018-01-01 RX ORDER — PROMETHAZINE HYDROCHLORIDE 25 MG/1
25 SUPPOSITORY RECTAL ONCE AS NEEDED
Status: DISCONTINUED | OUTPATIENT
Start: 2018-01-01 | End: 2018-01-01 | Stop reason: HOSPADM

## 2018-01-01 RX ORDER — MAGNESIUM HYDROXIDE 1200 MG/15ML
LIQUID ORAL AS NEEDED
Status: DISCONTINUED | OUTPATIENT
Start: 2018-01-01 | End: 2018-01-01 | Stop reason: HOSPADM

## 2018-01-01 RX ORDER — ONDANSETRON HYDROCHLORIDE 8 MG/1
8 TABLET, FILM COATED ORAL EVERY 8 HOURS PRN
Status: DISCONTINUED | OUTPATIENT
Start: 2018-01-01 | End: 2018-01-01 | Stop reason: HOSPADM

## 2018-01-01 RX ORDER — MORPHINE SULFATE 2 MG/ML
2 INJECTION, SOLUTION INTRAMUSCULAR; INTRAVENOUS
Status: ACTIVE | OUTPATIENT
Start: 2018-01-01 | End: 2018-01-01

## 2018-01-01 RX ORDER — SODIUM CHLORIDE 0.9 % (FLUSH) 0.9 %
10 SYRINGE (ML) INJECTION AS NEEDED
Status: CANCELLED | OUTPATIENT
Start: 2018-01-01

## 2018-01-01 RX ORDER — PROMETHAZINE HYDROCHLORIDE 12.5 MG/1
6.25 SUPPOSITORY RECTAL EVERY 4 HOURS PRN
Status: DISCONTINUED | OUTPATIENT
Start: 2018-01-01 | End: 2018-01-01 | Stop reason: HOSPADM

## 2018-01-01 RX ORDER — LORAZEPAM 2 MG/ML
2 INJECTION INTRAMUSCULAR
Status: CANCELLED | OUTPATIENT
Start: 2018-01-01 | End: 2018-01-01

## 2018-01-01 RX ORDER — PROMETHAZINE HYDROCHLORIDE 6.25 MG/5ML
6.25 SYRUP ORAL EVERY 4 HOURS PRN
Status: CANCELLED | OUTPATIENT
Start: 2018-01-01

## 2018-01-01 RX ORDER — HYDROCODONE BITARTRATE AND ACETAMINOPHEN 7.5; 325 MG/1; MG/1
1 TABLET ORAL ONCE AS NEEDED
Status: DISCONTINUED | OUTPATIENT
Start: 2018-01-01 | End: 2018-01-01 | Stop reason: HOSPADM

## 2018-01-01 RX ORDER — PREDNISONE 20 MG/1
20 TABLET ORAL DAILY
Status: DISCONTINUED | OUTPATIENT
Start: 2018-01-01 | End: 2018-01-01

## 2018-01-01 RX ORDER — BISACODYL 10 MG
10 SUPPOSITORY, RECTAL RECTAL DAILY PRN
Status: DISCONTINUED | OUTPATIENT
Start: 2018-01-01 | End: 2018-01-01 | Stop reason: HOSPADM

## 2018-01-01 RX ORDER — GABAPENTIN 100 MG/1
200 CAPSULE ORAL EVERY 12 HOURS SCHEDULED
Status: CANCELLED | OUTPATIENT
Start: 2018-01-01

## 2018-01-01 RX ORDER — PROMETHAZINE HYDROCHLORIDE 25 MG/1
6.25 TABLET ORAL EVERY 4 HOURS PRN
Status: DISCONTINUED | OUTPATIENT
Start: 2018-01-01 | End: 2018-01-01 | Stop reason: HOSPADM

## 2018-01-01 RX ORDER — GABAPENTIN 100 MG/1
200 CAPSULE ORAL EVERY 12 HOURS SCHEDULED
Status: DISCONTINUED | OUTPATIENT
Start: 2018-01-01 | End: 2018-01-01

## 2018-01-01 RX ORDER — DOXEPIN HYDROCHLORIDE 25 MG/1
25 CAPSULE ORAL NIGHTLY
Status: DISCONTINUED | OUTPATIENT
Start: 2018-01-01 | End: 2018-01-01 | Stop reason: HOSPADM

## 2018-01-01 RX ORDER — HYDROMORPHONE HYDROCHLORIDE 1 MG/ML
1 INJECTION, SOLUTION INTRAMUSCULAR; INTRAVENOUS; SUBCUTANEOUS
Status: DISCONTINUED | OUTPATIENT
Start: 2018-01-01 | End: 2018-01-01 | Stop reason: CLARIF

## 2018-01-01 RX ORDER — LORAZEPAM 2 MG/ML
0.5 CONCENTRATE ORAL
Status: CANCELLED | OUTPATIENT
Start: 2018-01-01 | End: 2018-01-01

## 2018-01-01 RX ORDER — ALPRAZOLAM 0.5 MG/1
0.5 TABLET ORAL 2 TIMES DAILY PRN
Status: CANCELLED | OUTPATIENT
Start: 2018-01-01

## 2018-01-01 RX ORDER — ACETAMINOPHEN 160 MG/5ML
650 SOLUTION ORAL EVERY 4 HOURS PRN
Status: CANCELLED | OUTPATIENT
Start: 2018-01-01

## 2018-01-01 RX ORDER — OXYCODONE HYDROCHLORIDE 15 MG/1
15 TABLET ORAL EVERY 6 HOURS PRN
Status: DISCONTINUED | OUTPATIENT
Start: 2018-01-01 | End: 2018-01-01 | Stop reason: HOSPADM

## 2018-01-01 RX ORDER — TAMSULOSIN HYDROCHLORIDE 0.4 MG/1
0.4 CAPSULE ORAL NIGHTLY
Status: DISCONTINUED | OUTPATIENT
Start: 2018-01-01 | End: 2018-01-01 | Stop reason: HOSPADM

## 2018-01-01 RX ORDER — OXYCODONE HYDROCHLORIDE 15 MG/1
15 TABLET ORAL EVERY 6 HOURS PRN
Status: CANCELLED | OUTPATIENT
Start: 2018-01-01

## 2018-01-01 RX ORDER — GLYCOPYRROLATE 0.2 MG/ML
0.4 INJECTION INTRAMUSCULAR; INTRAVENOUS
Status: CANCELLED | OUTPATIENT
Start: 2018-01-01

## 2018-01-01 RX ORDER — PROMETHAZINE HYDROCHLORIDE 25 MG/ML
6.25 INJECTION, SOLUTION INTRAMUSCULAR; INTRAVENOUS EVERY 4 HOURS PRN
Status: DISCONTINUED | OUTPATIENT
Start: 2018-01-01 | End: 2018-01-01 | Stop reason: HOSPADM

## 2018-01-01 RX ORDER — ALPRAZOLAM 0.5 MG/1
TABLET ORAL
Qty: 60 TABLET | Refills: 0 | OUTPATIENT
Start: 2018-01-01

## 2018-01-01 RX ORDER — HYDROMORPHONE HYDROCHLORIDE 1 MG/ML
0.5 INJECTION, SOLUTION INTRAMUSCULAR; INTRAVENOUS; SUBCUTANEOUS
Status: DISCONTINUED | OUTPATIENT
Start: 2018-01-01 | End: 2018-01-01 | Stop reason: CLARIF

## 2018-01-01 RX ORDER — DIPHENOXYLATE HYDROCHLORIDE AND ATROPINE SULFATE 2.5; .025 MG/1; MG/1
1 TABLET ORAL
Status: CANCELLED | OUTPATIENT
Start: 2018-01-01

## 2018-01-01 RX ORDER — OXYCODONE HYDROCHLORIDE 15 MG/1
30 TABLET ORAL EVERY 4 HOURS PRN
Status: DISCONTINUED | OUTPATIENT
Start: 2018-01-01 | End: 2018-01-01

## 2018-01-01 RX ORDER — MORPHINE SULFATE 20 MG/ML
5 SOLUTION ORAL
Status: CANCELLED | OUTPATIENT
Start: 2018-01-01 | End: 2018-01-01

## 2018-01-01 RX ORDER — MELOXICAM 15 MG/1
TABLET ORAL
COMMUNITY
Start: 2018-01-01 | End: 2018-01-01

## 2018-01-01 RX ORDER — LANOLIN ALCOHOL/MO/W.PET/CERES
CREAM (GRAM) TOPICAL
Qty: 30 TABLET | Refills: 3 | Status: SHIPPED | OUTPATIENT
Start: 2018-01-01 | End: 2018-01-01

## 2018-01-01 RX ORDER — HYDRALAZINE HYDROCHLORIDE 20 MG/ML
5 INJECTION INTRAMUSCULAR; INTRAVENOUS
Status: DISCONTINUED | OUTPATIENT
Start: 2018-01-01 | End: 2018-01-01 | Stop reason: HOSPADM

## 2018-01-01 RX ORDER — MORPHINE SULFATE 20 MG/ML
10 SOLUTION ORAL
Status: ACTIVE | OUTPATIENT
Start: 2018-01-01 | End: 2018-01-01

## 2018-01-01 RX ORDER — LORAZEPAM 2 MG/ML
0.5 INJECTION INTRAMUSCULAR
Status: DISCONTINUED | OUTPATIENT
Start: 2018-01-01 | End: 2018-01-01 | Stop reason: HOSPADM

## 2018-01-01 RX ORDER — CEFTRIAXONE SODIUM 1 G/50ML
1 INJECTION, SOLUTION INTRAVENOUS EVERY 24 HOURS
Status: DISPENSED | OUTPATIENT
Start: 2018-01-01 | End: 2018-01-01

## 2018-01-01 RX ORDER — SULFAMETHOXAZOLE AND TRIMETHOPRIM 800; 160 MG/1; MG/1
1 TABLET ORAL 2 TIMES DAILY
Qty: 20 TABLET | Refills: 0 | Status: SHIPPED | OUTPATIENT
Start: 2018-01-01

## 2018-01-01 RX ORDER — SODIUM CHLORIDE 0.9 % (FLUSH) 0.9 %
10 SYRINGE (ML) INJECTION AS NEEDED
Status: DISCONTINUED | OUTPATIENT
Start: 2018-01-01 | End: 2018-01-01 | Stop reason: HOSPADM

## 2018-01-01 RX ORDER — EPHEDRINE SULFATE 50 MG/ML
5 INJECTION, SOLUTION INTRAVENOUS ONCE AS NEEDED
Status: DISCONTINUED | OUTPATIENT
Start: 2018-01-01 | End: 2018-01-01 | Stop reason: HOSPADM

## 2018-01-01 RX ORDER — PROMETHAZINE HYDROCHLORIDE 25 MG/ML
6.25 INJECTION, SOLUTION INTRAMUSCULAR; INTRAVENOUS EVERY 4 HOURS PRN
Status: CANCELLED | OUTPATIENT
Start: 2018-01-01

## 2018-01-01 RX ORDER — PROMETHAZINE HYDROCHLORIDE 25 MG/ML
12.5 INJECTION, SOLUTION INTRAMUSCULAR; INTRAVENOUS ONCE AS NEEDED
Status: DISCONTINUED | OUTPATIENT
Start: 2018-01-01 | End: 2018-01-01 | Stop reason: HOSPADM

## 2018-01-01 RX ORDER — SODIUM CHLORIDE 0.9 % (FLUSH) 0.9 %
1-10 SYRINGE (ML) INJECTION AS NEEDED
Status: CANCELLED | OUTPATIENT
Start: 2018-01-01

## 2018-01-01 RX ORDER — FAMOTIDINE 20 MG/1
20 TABLET, FILM COATED ORAL EVERY MORNING
Status: DISCONTINUED | OUTPATIENT
Start: 2018-01-01 | End: 2018-01-01

## 2018-01-01 RX ORDER — ALPRAZOLAM 0.5 MG/1
TABLET ORAL
Qty: 60 TABLET | Refills: 1 | OUTPATIENT
Start: 2018-01-01

## 2018-01-01 RX ORDER — DOXEPIN HYDROCHLORIDE 25 MG/1
25 CAPSULE ORAL NIGHTLY
Status: CANCELLED | OUTPATIENT
Start: 2018-01-01

## 2018-01-01 RX ORDER — SODIUM CHLORIDE 0.9 % (FLUSH) 0.9 %
10 SYRINGE (ML) INJECTION AS NEEDED
OUTPATIENT
Start: 2018-01-01

## 2018-01-01 RX ORDER — MORPHINE SULFATE 2 MG/ML
2 INJECTION, SOLUTION INTRAMUSCULAR; INTRAVENOUS
Status: CANCELLED | OUTPATIENT
Start: 2018-01-01 | End: 2018-01-01

## 2018-01-01 RX ORDER — SPIRONOLACTONE 25 MG/1
TABLET ORAL
Qty: 30 TABLET | Refills: 1 | Status: SHIPPED | OUTPATIENT
Start: 2018-01-01 | End: 2018-01-01

## 2018-01-01 RX ORDER — ACETAMINOPHEN 650 MG/1
650 SUPPOSITORY RECTAL EVERY 4 HOURS PRN
Status: DISCONTINUED | OUTPATIENT
Start: 2018-01-01 | End: 2018-01-01 | Stop reason: HOSPADM

## 2018-01-01 RX ORDER — OXYCODONE HYDROCHLORIDE 15 MG/1
15 TABLET ORAL EVERY 6 HOURS PRN
Status: DISCONTINUED | OUTPATIENT
Start: 2018-01-01 | End: 2018-01-01

## 2018-01-01 RX ORDER — PREDNISONE 10 MG/1
20 TABLET ORAL DAILY
Qty: 60 TABLET | Refills: 3 | Status: SHIPPED | OUTPATIENT
Start: 2018-01-01

## 2018-01-01 RX ORDER — PROPOFOL 10 MG/ML
VIAL (ML) INTRAVENOUS AS NEEDED
Status: DISCONTINUED | OUTPATIENT
Start: 2018-01-01 | End: 2018-01-01 | Stop reason: SURG

## 2018-01-01 RX ORDER — FENTANYL CITRATE 50 UG/ML
INJECTION, SOLUTION INTRAMUSCULAR; INTRAVENOUS
Status: COMPLETED
Start: 2018-01-01 | End: 2018-01-01

## 2018-01-01 RX ORDER — DOXEPIN HYDROCHLORIDE 25 MG/1
CAPSULE ORAL
Qty: 30 CAPSULE | Refills: 6 | Status: SHIPPED | OUTPATIENT
Start: 2018-01-01

## 2018-01-01 RX ORDER — ALPRAZOLAM 0.5 MG/1
0.5 TABLET ORAL 2 TIMES DAILY PRN
Status: DISCONTINUED | OUTPATIENT
Start: 2018-01-01 | End: 2018-01-01 | Stop reason: HOSPADM

## 2018-01-01 RX ORDER — ACETAMINOPHEN 160 MG/5ML
650 SOLUTION ORAL EVERY 4 HOURS PRN
Status: DISCONTINUED | OUTPATIENT
Start: 2018-01-01 | End: 2018-01-01 | Stop reason: HOSPADM

## 2018-01-01 RX ORDER — SPIRONOLACTONE 50 MG/1
50 TABLET, FILM COATED ORAL DAILY
COMMUNITY
End: 2018-01-01 | Stop reason: SDUPTHER

## 2018-01-01 RX ORDER — PROMETHAZINE HYDROCHLORIDE 12.5 MG/1
6.25 SUPPOSITORY RECTAL EVERY 4 HOURS PRN
Status: CANCELLED | OUTPATIENT
Start: 2018-01-01

## 2018-01-01 RX ORDER — BUMETANIDE 0.5 MG/1
0.5 TABLET ORAL DAILY
Qty: 20 TABLET | Refills: 1 | Status: SHIPPED | OUTPATIENT
Start: 2018-01-01 | End: 2018-01-01 | Stop reason: SDUPTHER

## 2018-01-01 RX ORDER — DOXEPIN HYDROCHLORIDE 25 MG/1
25 CAPSULE ORAL NIGHTLY
Status: DISCONTINUED | OUTPATIENT
Start: 2018-01-01 | End: 2018-01-01

## 2018-01-01 RX ORDER — SODIUM CHLORIDE, SODIUM LACTATE, POTASSIUM CHLORIDE, CALCIUM CHLORIDE 600; 310; 30; 20 MG/100ML; MG/100ML; MG/100ML; MG/100ML
9 INJECTION, SOLUTION INTRAVENOUS CONTINUOUS
Status: CANCELLED | OUTPATIENT
Start: 2018-01-01

## 2018-01-01 RX ORDER — FAMOTIDINE 20 MG/1
20 TABLET, FILM COATED ORAL EVERY MORNING
Status: CANCELLED | OUTPATIENT
Start: 2018-01-01

## 2018-01-01 RX ORDER — ALPRAZOLAM 0.5 MG/1
TABLET ORAL
Qty: 60 TABLET | Refills: 0 | OUTPATIENT
Start: 2018-01-01 | End: 2018-01-01 | Stop reason: SDUPTHER

## 2018-01-01 RX ORDER — SODIUM CHLORIDE 9 MG/ML
20 INJECTION, SOLUTION INTRAVENOUS CONTINUOUS
Status: DISCONTINUED | OUTPATIENT
Start: 2018-01-01 | End: 2018-01-01

## 2018-01-01 RX ORDER — SODIUM CHLORIDE, SODIUM LACTATE, POTASSIUM CHLORIDE, CALCIUM CHLORIDE 600; 310; 30; 20 MG/100ML; MG/100ML; MG/100ML; MG/100ML
9 INJECTION, SOLUTION INTRAVENOUS CONTINUOUS
Status: DISCONTINUED | OUTPATIENT
Start: 2018-01-01 | End: 2018-01-01 | Stop reason: HOSPADM

## 2018-01-01 RX ORDER — GABAPENTIN 100 MG/1
200 CAPSULE ORAL EVERY 12 HOURS SCHEDULED
Status: DISCONTINUED | OUTPATIENT
Start: 2018-01-01 | End: 2018-01-01 | Stop reason: HOSPADM

## 2018-01-01 RX ORDER — CAPECITABINE 500 MG/1
TABLET, FILM COATED ORAL
Qty: 112 TABLET | Refills: 3 | Status: SHIPPED | OUTPATIENT
Start: 2018-01-01 | End: 2018-01-01

## 2018-01-01 RX ORDER — MIDAZOLAM HYDROCHLORIDE 1 MG/ML
1 INJECTION INTRAMUSCULAR; INTRAVENOUS
Status: DISCONTINUED | OUTPATIENT
Start: 2018-01-01 | End: 2018-01-01 | Stop reason: HOSPADM

## 2018-01-01 RX ORDER — MORPHINE SULFATE 20 MG/ML
10 SOLUTION ORAL
Status: CANCELLED | OUTPATIENT
Start: 2018-01-01 | End: 2018-01-01

## 2018-01-01 RX ORDER — OXYCODONE AND ACETAMINOPHEN 7.5; 325 MG/1; MG/1
1 TABLET ORAL ONCE AS NEEDED
Status: COMPLETED | OUTPATIENT
Start: 2018-01-01 | End: 2018-01-01

## 2018-01-01 RX ORDER — LORAZEPAM 2 MG/ML
2 CONCENTRATE ORAL
Status: CANCELLED | OUTPATIENT
Start: 2018-01-01 | End: 2018-01-01

## 2018-01-01 RX ORDER — PREDNISONE 20 MG/1
20 TABLET ORAL DAILY
Status: CANCELLED | OUTPATIENT
Start: 2018-01-01

## 2018-01-01 RX ORDER — SPIRONOLACTONE 50 MG/1
50 TABLET, FILM COATED ORAL DAILY
Qty: 60 TABLET | Refills: 3 | Status: SHIPPED | OUTPATIENT
Start: 2018-01-01 | End: 2018-01-01

## 2018-01-01 RX ORDER — SPIRONOLACTONE 25 MG/1
TABLET ORAL
Qty: 30 TABLET | Refills: 1 | Status: SHIPPED | OUTPATIENT
Start: 2018-01-01 | End: 2018-01-01 | Stop reason: DRUGHIGH

## 2018-01-01 RX ORDER — FAMOTIDINE 10 MG/ML
20 INJECTION, SOLUTION INTRAVENOUS ONCE
Status: COMPLETED | OUTPATIENT
Start: 2018-01-01 | End: 2018-01-01

## 2018-01-01 RX ORDER — FLUMAZENIL 0.1 MG/ML
0.2 INJECTION INTRAVENOUS AS NEEDED
Status: DISCONTINUED | OUTPATIENT
Start: 2018-01-01 | End: 2018-01-01 | Stop reason: HOSPADM

## 2018-01-01 RX ORDER — SPIRONOLACTONE 25 MG/1
TABLET ORAL
COMMUNITY
Start: 2018-01-01

## 2018-01-01 RX ORDER — LORAZEPAM 2 MG/ML
0.5 INJECTION INTRAMUSCULAR
Status: CANCELLED | OUTPATIENT
Start: 2018-01-01 | End: 2018-01-01

## 2018-01-01 RX ORDER — DIPHENHYDRAMINE HYDROCHLORIDE 50 MG/ML
12.5 INJECTION INTRAMUSCULAR; INTRAVENOUS
Status: DISCONTINUED | OUTPATIENT
Start: 2018-01-01 | End: 2018-01-01 | Stop reason: HOSPADM

## 2018-01-01 RX ORDER — HEPARIN SODIUM (PORCINE) LOCK FLUSH IV SOLN 100 UNIT/ML 100 UNIT/ML
500 SOLUTION INTRAVENOUS AS NEEDED
OUTPATIENT
Start: 2018-01-01

## 2018-01-01 RX ORDER — LORAZEPAM 2 MG/ML
1 CONCENTRATE ORAL
Status: CANCELLED | OUTPATIENT
Start: 2018-01-01 | End: 2018-01-01

## 2018-01-01 RX ORDER — OXYCODONE HYDROCHLORIDE 15 MG/1
30 TABLET ORAL EVERY 4 HOURS PRN
Status: DISPENSED | OUTPATIENT
Start: 2018-01-01 | End: 2018-01-01

## 2018-01-01 RX ORDER — FAMOTIDINE 20 MG/1
20 TABLET, FILM COATED ORAL EVERY MORNING
Status: DISCONTINUED | OUTPATIENT
Start: 2018-01-01 | End: 2018-01-01 | Stop reason: HOSPADM

## 2018-01-01 RX ORDER — METHYLPREDNISOLONE SODIUM SUCCINATE 40 MG/ML
40 INJECTION, POWDER, LYOPHILIZED, FOR SOLUTION INTRAMUSCULAR; INTRAVENOUS ONCE
Status: COMPLETED | OUTPATIENT
Start: 2018-01-01 | End: 2018-01-01

## 2018-01-01 RX ORDER — BUMETANIDE 0.5 MG/1
0.5 TABLET ORAL DAILY
Qty: 20 TABLET | Refills: 1 | Status: SHIPPED | OUTPATIENT
Start: 2018-01-01

## 2018-01-01 RX ORDER — MORPHINE SULFATE 20 MG/ML
10 SOLUTION ORAL
Status: DISCONTINUED | OUTPATIENT
Start: 2018-01-01 | End: 2018-01-01 | Stop reason: HOSPADM

## 2018-01-01 RX ORDER — PROMETHAZINE HYDROCHLORIDE 6.25 MG/5ML
6.25 SYRUP ORAL EVERY 4 HOURS PRN
Status: DISCONTINUED | OUTPATIENT
Start: 2018-01-01 | End: 2018-01-01 | Stop reason: HOSPADM

## 2018-01-01 RX ORDER — DIPHENOXYLATE HYDROCHLORIDE AND ATROPINE SULFATE 2.5; .025 MG/1; MG/1
1 TABLET ORAL DAILY
Status: DISCONTINUED | OUTPATIENT
Start: 2018-01-01 | End: 2018-01-01

## 2018-01-01 RX ORDER — FENTANYL CITRATE 50 UG/ML
50 INJECTION, SOLUTION INTRAMUSCULAR; INTRAVENOUS
Status: DISCONTINUED | OUTPATIENT
Start: 2018-01-01 | End: 2018-01-01 | Stop reason: HOSPADM

## 2018-01-01 RX ORDER — OXYCODONE HYDROCHLORIDE 15 MG/1
30 TABLET ORAL
Status: DISCONTINUED | OUTPATIENT
Start: 2018-01-01 | End: 2018-01-01

## 2018-01-01 RX ORDER — SULFAMETHOXAZOLE AND TRIMETHOPRIM 800; 160 MG/1; MG/1
1 TABLET ORAL EVERY 12 HOURS SCHEDULED
Status: COMPLETED | OUTPATIENT
Start: 2018-01-01 | End: 2018-01-01

## 2018-01-01 RX ORDER — ONDANSETRON 2 MG/ML
4 INJECTION INTRAMUSCULAR; INTRAVENOUS ONCE AS NEEDED
Status: DISCONTINUED | OUTPATIENT
Start: 2018-01-01 | End: 2018-01-01 | Stop reason: HOSPADM

## 2018-01-01 RX ORDER — ACETAMINOPHEN 650 MG/1
650 SUPPOSITORY RECTAL EVERY 4 HOURS PRN
Status: CANCELLED | OUTPATIENT
Start: 2018-01-01

## 2018-01-01 RX ORDER — HYDROMORPHONE HYDROCHLORIDE 1 MG/ML
0.5 INJECTION, SOLUTION INTRAMUSCULAR; INTRAVENOUS; SUBCUTANEOUS
Status: DISCONTINUED | OUTPATIENT
Start: 2018-01-01 | End: 2018-01-01 | Stop reason: HOSPADM

## 2018-01-01 RX ORDER — LORAZEPAM 2 MG/ML
1 CONCENTRATE ORAL
Status: DISCONTINUED | OUTPATIENT
Start: 2018-01-01 | End: 2018-01-01 | Stop reason: HOSPADM

## 2018-01-01 RX ORDER — MORPHINE SULFATE 2 MG/ML
2 INJECTION, SOLUTION INTRAMUSCULAR; INTRAVENOUS
Status: DISCONTINUED | OUTPATIENT
Start: 2018-01-01 | End: 2018-01-01 | Stop reason: HOSPADM

## 2018-01-01 RX ORDER — MORPHINE SULFATE 10 MG/ML
4 INJECTION INTRAMUSCULAR; INTRAVENOUS; SUBCUTANEOUS
Status: DISCONTINUED | OUTPATIENT
Start: 2018-01-01 | End: 2018-01-01 | Stop reason: HOSPADM

## 2018-01-01 RX ORDER — PROMETHAZINE HYDROCHLORIDE 25 MG/1
6.25 TABLET ORAL EVERY 4 HOURS PRN
Status: CANCELLED | OUTPATIENT
Start: 2018-01-01

## 2018-01-01 RX ORDER — ONDANSETRON HYDROCHLORIDE 8 MG/1
8 TABLET, FILM COATED ORAL EVERY 8 HOURS PRN
Status: CANCELLED | OUTPATIENT
Start: 2018-01-01

## 2018-01-01 RX ORDER — ROCURONIUM BROMIDE 10 MG/ML
INJECTION, SOLUTION INTRAVENOUS AS NEEDED
Status: DISCONTINUED | OUTPATIENT
Start: 2018-01-01 | End: 2018-01-01 | Stop reason: SURG

## 2018-01-01 RX ORDER — ONDANSETRON 2 MG/ML
INJECTION INTRAMUSCULAR; INTRAVENOUS AS NEEDED
Status: DISCONTINUED | OUTPATIENT
Start: 2018-01-01 | End: 2018-01-01 | Stop reason: SURG

## 2018-01-01 RX ORDER — ONDANSETRON 2 MG/ML
4 INJECTION INTRAMUSCULAR; INTRAVENOUS ONCE
Status: COMPLETED | OUTPATIENT
Start: 2018-01-01 | End: 2018-01-01

## 2018-01-01 RX ORDER — LORAZEPAM 2 MG/ML
1 INJECTION INTRAMUSCULAR
Status: CANCELLED | OUTPATIENT
Start: 2018-01-01 | End: 2018-01-01

## 2018-01-01 RX ORDER — NALOXONE HCL 0.4 MG/ML
0.2 VIAL (ML) INJECTION AS NEEDED
Status: DISCONTINUED | OUTPATIENT
Start: 2018-01-01 | End: 2018-01-01 | Stop reason: HOSPADM

## 2018-01-01 RX ADMIN — OXYCODONE HYDROCHLORIDE 30 MG: 15 TABLET ORAL at 21:10

## 2018-01-01 RX ADMIN — POLYETHYLENE GLYCOL 3350 17 G: 17 POWDER, FOR SOLUTION ORAL at 08:56

## 2018-01-01 RX ADMIN — POLYETHYLENE GLYCOL 3350 17 G: 17 POWDER, FOR SOLUTION ORAL at 09:26

## 2018-01-01 RX ADMIN — OXYCODONE HYDROCHLORIDE 30 MG: 15 TABLET ORAL at 21:06

## 2018-01-01 RX ADMIN — DOCUSATE SODIUM 100 MG: 100 CAPSULE, LIQUID FILLED ORAL at 21:20

## 2018-01-01 RX ADMIN — OXYCODONE HYDROCHLORIDE 15 MG: 15 TABLET ORAL at 08:50

## 2018-01-01 RX ADMIN — HYDROMORPHONE HYDROCHLORIDE 0.5 MG: 10 INJECTION INTRAMUSCULAR; INTRAVENOUS; SUBCUTANEOUS at 17:23

## 2018-01-01 RX ADMIN — HYDROMORPHONE HYDROCHLORIDE 1 MG: 10 INJECTION INTRAMUSCULAR; INTRAVENOUS; SUBCUTANEOUS at 22:42

## 2018-01-01 RX ADMIN — FAMOTIDINE 20 MG: 20 TABLET, FILM COATED ORAL at 06:35

## 2018-01-01 RX ADMIN — CEFTRIAXONE SODIUM 1 G: 1 INJECTION, SOLUTION INTRAVENOUS at 18:26

## 2018-01-01 RX ADMIN — GABAPENTIN 200 MG: 100 CAPSULE ORAL at 08:25

## 2018-01-01 RX ADMIN — ALPRAZOLAM 0.5 MG: 0.5 TABLET ORAL at 21:53

## 2018-01-01 RX ADMIN — SULFAMETHOXAZOLE AND TRIMETHOPRIM 160 MG: 800; 160 TABLET ORAL at 20:45

## 2018-01-01 RX ADMIN — ALPRAZOLAM 0.5 MG: 0.5 TABLET ORAL at 10:02

## 2018-01-01 RX ADMIN — POLYETHYLENE GLYCOL 3350 17 G: 17 POWDER, FOR SOLUTION ORAL at 10:00

## 2018-01-01 RX ADMIN — POLYETHYLENE GLYCOL 3350 17 G: 17 POWDER, FOR SOLUTION ORAL at 09:20

## 2018-01-01 RX ADMIN — HYDROMORPHONE HYDROCHLORIDE 1 MG: 10 INJECTION INTRAMUSCULAR; INTRAVENOUS; SUBCUTANEOUS at 06:40

## 2018-01-01 RX ADMIN — OXYCODONE HYDROCHLORIDE 15 MG: 15 TABLET ORAL at 06:31

## 2018-01-01 RX ADMIN — OXYCODONE HYDROCHLORIDE 15 MG: 15 TABLET ORAL at 22:08

## 2018-01-01 RX ADMIN — PREDNISONE 20 MG: 20 TABLET ORAL at 08:32

## 2018-01-01 RX ADMIN — FAMOTIDINE 20 MG: 20 TABLET, FILM COATED ORAL at 06:49

## 2018-01-01 RX ADMIN — DOCUSATE SODIUM 100 MG: 100 CAPSULE, LIQUID FILLED ORAL at 09:54

## 2018-01-01 RX ADMIN — HYDROMORPHONE HYDROCHLORIDE 1 MG: 10 INJECTION INTRAMUSCULAR; INTRAVENOUS; SUBCUTANEOUS at 19:33

## 2018-01-01 RX ADMIN — SUGAMMADEX 1.5 ML: 100 INJECTION, SOLUTION INTRAVENOUS at 17:27

## 2018-01-01 RX ADMIN — GABAPENTIN 200 MG: 100 CAPSULE ORAL at 21:13

## 2018-01-01 RX ADMIN — OXYCODONE HYDROCHLORIDE 30 MG: 15 TABLET ORAL at 08:56

## 2018-01-01 RX ADMIN — PREDNISONE 20 MG: 20 TABLET ORAL at 09:28

## 2018-01-01 RX ADMIN — GABAPENTIN 200 MG: 100 CAPSULE ORAL at 20:47

## 2018-01-01 RX ADMIN — PROPOFOL 20 MG: 10 INJECTION, EMULSION INTRAVENOUS at 16:37

## 2018-01-01 RX ADMIN — FAMOTIDINE 20 MG: 20 TABLET, FILM COATED ORAL at 06:39

## 2018-01-01 RX ADMIN — OXYCODONE HYDROCHLORIDE 30 MG: 15 TABLET ORAL at 05:47

## 2018-01-01 RX ADMIN — OXYCODONE HYDROCHLORIDE 15 MG: 15 TABLET ORAL at 16:08

## 2018-01-01 RX ADMIN — TAMSULOSIN HYDROCHLORIDE 0.4 MG: 0.4 CAPSULE ORAL at 20:51

## 2018-01-01 RX ADMIN — HYDROMORPHONE HYDROCHLORIDE 2 MG: 1 INJECTION, SOLUTION INTRAMUSCULAR; INTRAVENOUS; SUBCUTANEOUS at 09:20

## 2018-01-01 RX ADMIN — OXYCODONE HYDROCHLORIDE 15 MG: 15 TABLET ORAL at 15:04

## 2018-01-01 RX ADMIN — ALPRAZOLAM 0.5 MG: 0.5 TABLET ORAL at 08:21

## 2018-01-01 RX ADMIN — CEFTRIAXONE SODIUM 1 G: 1 INJECTION, SOLUTION INTRAVENOUS at 18:05

## 2018-01-01 RX ADMIN — FAMOTIDINE 20 MG: 20 TABLET, FILM COATED ORAL at 06:26

## 2018-01-01 RX ADMIN — SODIUM CHLORIDE, PRESERVATIVE FREE 500 UNITS: 5 INJECTION INTRAVENOUS at 12:48

## 2018-01-01 RX ADMIN — OXYCODONE HYDROCHLORIDE 30 MG: 15 TABLET ORAL at 01:04

## 2018-01-01 RX ADMIN — TAMSULOSIN HYDROCHLORIDE 0.4 MG: 0.4 CAPSULE ORAL at 20:24

## 2018-01-01 RX ADMIN — LIDOCAINE HYDROCHLORIDE 20 ML: 10 INJECTION, SOLUTION INFILTRATION; PERINEURAL at 15:32

## 2018-01-01 RX ADMIN — OXYCODONE HYDROCHLORIDE 15 MG: 15 TABLET ORAL at 09:14

## 2018-01-01 RX ADMIN — POLYETHYLENE GLYCOL 3350 17 G: 17 POWDER, FOR SOLUTION ORAL at 08:16

## 2018-01-01 RX ADMIN — ALPRAZOLAM 0.5 MG: 0.5 TABLET ORAL at 10:28

## 2018-01-01 RX ADMIN — DOXEPIN HYDROCHLORIDE 25 MG: 25 CAPSULE ORAL at 21:09

## 2018-01-01 RX ADMIN — METHYLPREDNISOLONE SODIUM SUCCINATE 40 MG: 40 INJECTION, POWDER, FOR SOLUTION INTRAMUSCULAR; INTRAVENOUS at 12:58

## 2018-01-01 RX ADMIN — ALPRAZOLAM 0.5 MG: 0.5 TABLET ORAL at 09:04

## 2018-01-01 RX ADMIN — HYDROMORPHONE HYDROCHLORIDE 0.5 MG: 1 INJECTION, SOLUTION INTRAMUSCULAR; INTRAVENOUS; SUBCUTANEOUS at 17:45

## 2018-01-01 RX ADMIN — TAMSULOSIN HYDROCHLORIDE 0.4 MG: 0.4 CAPSULE ORAL at 20:45

## 2018-01-01 RX ADMIN — OXYCODONE HYDROCHLORIDE 30 MG: 15 TABLET ORAL at 05:17

## 2018-01-01 RX ADMIN — ACETAMINOPHEN 650 MG: 325 TABLET ORAL at 19:06

## 2018-01-01 RX ADMIN — LIDOCAINE HYDROCHLORIDE 50 MG: 20 INJECTION, SOLUTION INFILTRATION; PERINEURAL at 16:34

## 2018-01-01 RX ADMIN — Medication 10 ML: at 12:48

## 2018-01-01 RX ADMIN — OXYCODONE HYDROCHLORIDE 30 MG: 15 TABLET ORAL at 21:20

## 2018-01-01 RX ADMIN — OXYCODONE HYDROCHLORIDE 30 MG: 15 TABLET ORAL at 20:30

## 2018-01-01 RX ADMIN — GABAPENTIN 200 MG: 100 CAPSULE ORAL at 09:04

## 2018-01-01 RX ADMIN — Medication 1 TABLET: at 08:43

## 2018-01-01 RX ADMIN — OXYCODONE HYDROCHLORIDE 15 MG: 15 TABLET ORAL at 04:12

## 2018-01-01 RX ADMIN — OXYCODONE HYDROCHLORIDE 30 MG: 15 TABLET ORAL at 06:26

## 2018-01-01 RX ADMIN — FAMOTIDINE 20 MG: 10 INJECTION, SOLUTION INTRAVENOUS at 15:15

## 2018-01-01 RX ADMIN — HYDROMORPHONE HYDROCHLORIDE 0.5 MG: 10 INJECTION INTRAMUSCULAR; INTRAVENOUS; SUBCUTANEOUS at 07:47

## 2018-01-01 RX ADMIN — PROPOFOL 100 MG: 10 INJECTION, EMULSION INTRAVENOUS at 16:34

## 2018-01-01 RX ADMIN — OXYCODONE HYDROCHLORIDE 15 MG: 15 TABLET ORAL at 00:17

## 2018-01-01 RX ADMIN — TAMSULOSIN HYDROCHLORIDE 0.4 MG: 0.4 CAPSULE ORAL at 21:13

## 2018-01-01 RX ADMIN — GABAPENTIN 200 MG: 100 CAPSULE ORAL at 20:30

## 2018-01-01 RX ADMIN — FAMOTIDINE 20 MG: 20 TABLET, FILM COATED ORAL at 06:23

## 2018-01-01 RX ADMIN — HYDROMORPHONE HYDROCHLORIDE 1 MG: 10 INJECTION INTRAMUSCULAR; INTRAVENOUS; SUBCUTANEOUS at 09:21

## 2018-01-01 RX ADMIN — PREDNISONE 20 MG: 20 TABLET ORAL at 08:41

## 2018-01-01 RX ADMIN — DOXEPIN HYDROCHLORIDE 25 MG: 25 CAPSULE ORAL at 21:50

## 2018-01-01 RX ADMIN — DOXEPIN HYDROCHLORIDE 25 MG: 25 CAPSULE ORAL at 21:24

## 2018-01-01 RX ADMIN — HYDROMORPHONE HYDROCHLORIDE 0.5 MG: 10 INJECTION INTRAMUSCULAR; INTRAVENOUS; SUBCUTANEOUS at 16:44

## 2018-01-01 RX ADMIN — POLYETHYLENE GLYCOL 3350 17 G: 17 POWDER, FOR SOLUTION ORAL at 08:25

## 2018-01-01 RX ADMIN — FAMOTIDINE 20 MG: 20 TABLET, FILM COATED ORAL at 06:12

## 2018-01-01 RX ADMIN — HYDROMORPHONE HYDROCHLORIDE 1 MG: 10 INJECTION INTRAMUSCULAR; INTRAVENOUS; SUBCUTANEOUS at 08:33

## 2018-01-01 RX ADMIN — DOXEPIN HYDROCHLORIDE 25 MG: 25 CAPSULE ORAL at 21:19

## 2018-01-01 RX ADMIN — OXYCODONE HYDROCHLORIDE 30 MG: 15 TABLET ORAL at 13:54

## 2018-01-01 RX ADMIN — Medication 10 ML: at 12:40

## 2018-01-01 RX ADMIN — FENTANYL CITRATE 25 MCG: 50 INJECTION INTRAMUSCULAR; INTRAVENOUS at 16:39

## 2018-01-01 RX ADMIN — OXYCODONE HYDROCHLORIDE 15 MG: 15 TABLET ORAL at 06:50

## 2018-01-01 RX ADMIN — GABAPENTIN 200 MG: 100 CAPSULE ORAL at 20:24

## 2018-01-01 RX ADMIN — SULFAMETHOXAZOLE AND TRIMETHOPRIM 160 MG: 800; 160 TABLET ORAL at 08:44

## 2018-01-01 RX ADMIN — OXYCODONE HYDROCHLORIDE 30 MG: 15 TABLET ORAL at 08:13

## 2018-01-01 RX ADMIN — GABAPENTIN 200 MG: 100 CAPSULE ORAL at 08:43

## 2018-01-01 RX ADMIN — OXYCODONE HYDROCHLORIDE 30 MG: 15 TABLET ORAL at 12:56

## 2018-01-01 RX ADMIN — DOXEPIN HYDROCHLORIDE 25 MG: 25 CAPSULE ORAL at 21:06

## 2018-01-01 RX ADMIN — GABAPENTIN 200 MG: 100 CAPSULE ORAL at 11:13

## 2018-01-01 RX ADMIN — OXYCODONE HYDROCHLORIDE 15 MG: 15 TABLET ORAL at 10:14

## 2018-01-01 RX ADMIN — HYDROMORPHONE HYDROCHLORIDE 1 MG: 10 INJECTION INTRAMUSCULAR; INTRAVENOUS; SUBCUTANEOUS at 21:34

## 2018-01-01 RX ADMIN — SODIUM CHLORIDE, PRESERVATIVE FREE 500 UNITS: 5 INJECTION INTRAVENOUS at 13:46

## 2018-01-01 RX ADMIN — OXYCODONE HYDROCHLORIDE 30 MG: 15 TABLET ORAL at 16:19

## 2018-01-01 RX ADMIN — OXYCODONE HYDROCHLORIDE 30 MG: 15 TABLET ORAL at 17:11

## 2018-01-01 RX ADMIN — DOXEPIN HYDROCHLORIDE 25 MG: 25 CAPSULE ORAL at 21:20

## 2018-01-01 RX ADMIN — FAMOTIDINE 20 MG: 20 TABLET, FILM COATED ORAL at 06:31

## 2018-01-01 RX ADMIN — ALPRAZOLAM 0.5 MG: 0.5 TABLET ORAL at 09:54

## 2018-01-01 RX ADMIN — OXYCODONE HYDROCHLORIDE 30 MG: 15 TABLET ORAL at 01:34

## 2018-01-01 RX ADMIN — HYDROMORPHONE HYDROCHLORIDE 2 MG: 1 INJECTION, SOLUTION INTRAMUSCULAR; INTRAVENOUS; SUBCUTANEOUS at 09:09

## 2018-01-01 RX ADMIN — OXYCODONE HYDROCHLORIDE 30 MG: 15 TABLET ORAL at 21:19

## 2018-01-01 RX ADMIN — LIDOCAINE HYDROCHLORIDE 20 ML: 10 INJECTION, SOLUTION INFILTRATION; PERINEURAL at 08:07

## 2018-01-01 RX ADMIN — OXYCODONE HYDROCHLORIDE 15 MG: 15 TABLET ORAL at 20:59

## 2018-01-01 RX ADMIN — BISACODYL 10 MG: 10 SUPPOSITORY RECTAL at 18:21

## 2018-01-01 RX ADMIN — Medication 10 ML: at 13:34

## 2018-01-01 RX ADMIN — GABAPENTIN 200 MG: 100 CAPSULE ORAL at 21:50

## 2018-01-01 RX ADMIN — FENTANYL CITRATE 50 MCG: 50 INJECTION INTRAMUSCULAR; INTRAVENOUS at 17:45

## 2018-01-01 RX ADMIN — ACETAMINOPHEN 650 MG: 325 TABLET ORAL at 04:00

## 2018-01-01 RX ADMIN — OXYCODONE HYDROCHLORIDE 30 MG: 15 TABLET ORAL at 08:43

## 2018-01-01 RX ADMIN — HYDROMORPHONE HYDROCHLORIDE 1 MG: 10 INJECTION INTRAMUSCULAR; INTRAVENOUS; SUBCUTANEOUS at 04:20

## 2018-01-01 RX ADMIN — FENTANYL CITRATE 25 MCG: 50 INJECTION INTRAMUSCULAR; INTRAVENOUS at 17:29

## 2018-01-01 RX ADMIN — OXYCODONE HYDROCHLORIDE 15 MG: 15 TABLET ORAL at 04:26

## 2018-01-01 RX ADMIN — POLYETHYLENE GLYCOL 3350 17 G: 17 POWDER, FOR SOLUTION ORAL at 08:45

## 2018-01-01 RX ADMIN — ALPRAZOLAM 0.5 MG: 0.5 TABLET ORAL at 21:01

## 2018-01-01 RX ADMIN — HYDROMORPHONE HYDROCHLORIDE 1 MG: 10 INJECTION INTRAMUSCULAR; INTRAVENOUS; SUBCUTANEOUS at 14:23

## 2018-01-01 RX ADMIN — POLYETHYLENE GLYCOL 3350 17 G: 17 POWDER, FOR SOLUTION ORAL at 09:51

## 2018-01-01 RX ADMIN — OXYCODONE HYDROCHLORIDE 30 MG: 15 TABLET ORAL at 21:09

## 2018-01-01 RX ADMIN — SULFAMETHOXAZOLE AND TRIMETHOPRIM 160 MG: 800; 160 TABLET ORAL at 09:14

## 2018-01-01 RX ADMIN — FAMOTIDINE 20 MG: 20 TABLET, FILM COATED ORAL at 07:24

## 2018-01-01 RX ADMIN — SULFAMETHOXAZOLE AND TRIMETHOPRIM 160 MG: 800; 160 TABLET ORAL at 20:43

## 2018-01-01 RX ADMIN — IOPAMIDOL 85 ML: 612 INJECTION, SOLUTION INTRAVENOUS at 13:07

## 2018-01-01 RX ADMIN — Medication 1 TABLET: at 10:00

## 2018-01-01 RX ADMIN — CEFTRIAXONE SODIUM 1 G: 1 INJECTION, SOLUTION INTRAVENOUS at 17:52

## 2018-01-01 RX ADMIN — PREDNISONE 20 MG: 20 TABLET ORAL at 09:51

## 2018-01-01 RX ADMIN — OXYCODONE HYDROCHLORIDE 15 MG: 15 TABLET ORAL at 10:10

## 2018-01-01 RX ADMIN — GABAPENTIN 200 MG: 100 CAPSULE ORAL at 09:26

## 2018-01-01 RX ADMIN — DOXEPIN HYDROCHLORIDE 25 MG: 25 CAPSULE ORAL at 20:30

## 2018-01-01 RX ADMIN — SULFAMETHOXAZOLE AND TRIMETHOPRIM 160 MG: 800; 160 TABLET ORAL at 08:01

## 2018-01-01 RX ADMIN — OXYCODONE HYDROCHLORIDE AND ACETAMINOPHEN 1 TABLET: 7.5; 325 TABLET ORAL at 18:13

## 2018-01-01 RX ADMIN — OXYCODONE HYDROCHLORIDE 30 MG: 15 TABLET ORAL at 13:27

## 2018-01-01 RX ADMIN — FENTANYL CITRATE 25 MCG: 50 INJECTION INTRAMUSCULAR; INTRAVENOUS at 16:34

## 2018-01-01 RX ADMIN — ACETAMINOPHEN 650 MG: 325 TABLET ORAL at 06:02

## 2018-01-01 RX ADMIN — OXALIPLATIN 130 MG: 5 INJECTION, SOLUTION, CONCENTRATE INTRAVENOUS at 14:55

## 2018-01-01 RX ADMIN — DOCUSATE SODIUM 100 MG: 100 CAPSULE, LIQUID FILLED ORAL at 09:20

## 2018-01-01 RX ADMIN — OXYCODONE HYDROCHLORIDE 30 MG: 15 TABLET ORAL at 17:23

## 2018-01-01 RX ADMIN — SULFAMETHOXAZOLE AND TRIMETHOPRIM 160 MG: 800; 160 TABLET ORAL at 20:55

## 2018-01-01 RX ADMIN — Medication 1 TABLET: at 09:14

## 2018-01-01 RX ADMIN — FAMOTIDINE 20 MG: 20 TABLET, FILM COATED ORAL at 08:32

## 2018-01-01 RX ADMIN — LIDOCAINE HYDROCHLORIDE 10 ML: 10 INJECTION, SOLUTION INFILTRATION; PERINEURAL at 11:15

## 2018-01-01 RX ADMIN — HYDROMORPHONE HYDROCHLORIDE 0.5 MG: 10 INJECTION INTRAMUSCULAR; INTRAVENOUS; SUBCUTANEOUS at 12:23

## 2018-01-01 RX ADMIN — Medication 1 TABLET: at 08:44

## 2018-01-01 RX ADMIN — POLYETHYLENE GLYCOL 3350 17 G: 17 POWDER, FOR SOLUTION ORAL at 08:13

## 2018-01-01 RX ADMIN — OXYCODONE HYDROCHLORIDE 30 MG: 15 TABLET ORAL at 05:09

## 2018-01-01 RX ADMIN — SODIUM CHLORIDE 20 ML/HR: 9 INJECTION, SOLUTION INTRAVENOUS at 07:24

## 2018-01-01 RX ADMIN — Medication 1 TABLET: at 08:01

## 2018-01-01 RX ADMIN — GABAPENTIN 200 MG: 100 CAPSULE ORAL at 10:00

## 2018-01-01 RX ADMIN — FAMOTIDINE 20 MG: 20 TABLET, FILM COATED ORAL at 06:24

## 2018-01-01 RX ADMIN — HYDROMORPHONE HYDROCHLORIDE 2 MG: 1 INJECTION, SOLUTION INTRAMUSCULAR; INTRAVENOUS; SUBCUTANEOUS at 19:52

## 2018-01-01 RX ADMIN — DOCUSATE SODIUM 100 MG: 100 CAPSULE, LIQUID FILLED ORAL at 13:48

## 2018-01-01 RX ADMIN — HYDROMORPHONE HYDROCHLORIDE 2 MG: 1 INJECTION, SOLUTION INTRAMUSCULAR; INTRAVENOUS; SUBCUTANEOUS at 13:30

## 2018-01-01 RX ADMIN — PREDNISONE 20 MG: 20 TABLET ORAL at 08:43

## 2018-01-01 RX ADMIN — OXYCODONE HYDROCHLORIDE 30 MG: 15 TABLET ORAL at 17:50

## 2018-01-01 RX ADMIN — POLYETHYLENE GLYCOL 3350 17 G: 17 POWDER, FOR SOLUTION ORAL at 08:01

## 2018-01-01 RX ADMIN — FENTANYL CITRATE 25 MCG: 50 INJECTION INTRAMUSCULAR; INTRAVENOUS at 17:13

## 2018-01-01 RX ADMIN — POLYETHYLENE GLYCOL 3350 17 G: 17 POWDER, FOR SOLUTION ORAL at 08:41

## 2018-01-01 RX ADMIN — CEFTRIAXONE SODIUM 1 G: 1 INJECTION, SOLUTION INTRAVENOUS at 10:40

## 2018-01-01 RX ADMIN — PREDNISONE 20 MG: 20 TABLET ORAL at 08:25

## 2018-01-01 RX ADMIN — FAMOTIDINE 20 MG: 20 TABLET, FILM COATED ORAL at 08:41

## 2018-01-01 RX ADMIN — OXYCODONE HYDROCHLORIDE 30 MG: 15 TABLET ORAL at 13:48

## 2018-01-01 RX ADMIN — OXYCODONE HYDROCHLORIDE 30 MG: 15 TABLET ORAL at 18:00

## 2018-01-01 RX ADMIN — OXYCODONE HYDROCHLORIDE 15 MG: 15 TABLET ORAL at 02:54

## 2018-01-01 RX ADMIN — GABAPENTIN 200 MG: 100 CAPSULE ORAL at 21:20

## 2018-01-01 RX ADMIN — OXYCODONE HYDROCHLORIDE 30 MG: 15 TABLET ORAL at 01:16

## 2018-01-01 RX ADMIN — OXYCODONE HYDROCHLORIDE 30 MG: 15 TABLET ORAL at 01:10

## 2018-01-01 RX ADMIN — PALONOSETRON HYDROCHLORIDE 0.25 MG: 0.25 INJECTION INTRAVENOUS at 13:44

## 2018-01-01 RX ADMIN — SULFAMETHOXAZOLE AND TRIMETHOPRIM 160 MG: 800; 160 TABLET ORAL at 08:32

## 2018-01-01 RX ADMIN — PREDNISONE 20 MG: 20 TABLET ORAL at 08:56

## 2018-01-01 RX ADMIN — PREDNISONE 20 MG: 20 TABLET ORAL at 08:44

## 2018-01-01 RX ADMIN — GABAPENTIN 200 MG: 100 CAPSULE ORAL at 20:51

## 2018-01-01 RX ADMIN — OXYCODONE HYDROCHLORIDE 30 MG: 15 TABLET ORAL at 01:56

## 2018-01-01 RX ADMIN — OXYCODONE HYDROCHLORIDE 15 MG: 15 TABLET ORAL at 03:54

## 2018-01-01 RX ADMIN — SODIUM CHLORIDE 1000 ML: 9 INJECTION, SOLUTION INTRAVENOUS at 10:08

## 2018-01-01 RX ADMIN — OXYCODONE HYDROCHLORIDE 30 MG: 15 TABLET ORAL at 09:51

## 2018-01-01 RX ADMIN — OXALIPLATIN 130 MG: 5 INJECTION, SOLUTION, CONCENTRATE INTRAVENOUS at 14:35

## 2018-01-01 RX ADMIN — POLYETHYLENE GLYCOL 3350 17 G: 17 POWDER, FOR SOLUTION ORAL at 09:55

## 2018-01-01 RX ADMIN — HYDROMORPHONE HYDROCHLORIDE 0.5 MG: 10 INJECTION INTRAMUSCULAR; INTRAVENOUS; SUBCUTANEOUS at 22:01

## 2018-01-01 RX ADMIN — OXYCODONE HYDROCHLORIDE 15 MG: 15 TABLET ORAL at 21:53

## 2018-01-01 RX ADMIN — HYDROMORPHONE HYDROCHLORIDE 1 MG: 10 INJECTION INTRAMUSCULAR; INTRAVENOUS; SUBCUTANEOUS at 15:09

## 2018-01-01 RX ADMIN — PREDNISONE 20 MG: 20 TABLET ORAL at 08:01

## 2018-01-01 RX ADMIN — ALPRAZOLAM 0.5 MG: 0.5 TABLET ORAL at 10:37

## 2018-01-01 RX ADMIN — Medication 1 TABLET: at 08:32

## 2018-01-01 RX ADMIN — DOXEPIN HYDROCHLORIDE 25 MG: 25 CAPSULE ORAL at 20:24

## 2018-01-01 RX ADMIN — ONDANSETRON 4 MG: 2 INJECTION INTRAMUSCULAR; INTRAVENOUS at 17:14

## 2018-01-01 RX ADMIN — Medication 1 TABLET: at 09:28

## 2018-01-01 RX ADMIN — OXYCODONE HYDROCHLORIDE 30 MG: 15 TABLET ORAL at 09:26

## 2018-01-01 RX ADMIN — Medication 1 TABLET: at 08:13

## 2018-01-01 RX ADMIN — TAMSULOSIN HYDROCHLORIDE 0.4 MG: 0.4 CAPSULE ORAL at 20:43

## 2018-01-01 RX ADMIN — SULFAMETHOXAZOLE AND TRIMETHOPRIM 160 MG: 800; 160 TABLET ORAL at 21:24

## 2018-01-01 RX ADMIN — OXYCODONE HYDROCHLORIDE 30 MG: 15 TABLET ORAL at 05:10

## 2018-01-01 RX ADMIN — ALPRAZOLAM 0.5 MG: 0.5 TABLET ORAL at 08:33

## 2018-01-01 RX ADMIN — PREDNISONE 20 MG: 20 TABLET ORAL at 20:55

## 2018-01-01 RX ADMIN — DEXTROSE MONOHYDRATE 250 ML: 5 INJECTION, SOLUTION INTRAVENOUS at 13:44

## 2018-01-01 RX ADMIN — OXYCODONE HYDROCHLORIDE 30 MG: 15 TABLET ORAL at 09:54

## 2018-01-01 RX ADMIN — FENTANYL CITRATE 50 MCG: 50 INJECTION INTRAMUSCULAR; INTRAVENOUS at 18:30

## 2018-01-01 RX ADMIN — GABAPENTIN 200 MG: 100 CAPSULE ORAL at 08:56

## 2018-01-01 RX ADMIN — OXYCODONE HYDROCHLORIDE 15 MG: 15 TABLET ORAL at 15:03

## 2018-01-01 RX ADMIN — FENTANYL CITRATE 50 MCG: 50 INJECTION INTRAMUSCULAR; INTRAVENOUS at 18:40

## 2018-01-01 RX ADMIN — HYDROMORPHONE HYDROCHLORIDE 0.5 MG: 10 INJECTION INTRAMUSCULAR; INTRAVENOUS; SUBCUTANEOUS at 19:05

## 2018-01-01 RX ADMIN — HYDROMORPHONE HYDROCHLORIDE 1 MG: 10 INJECTION INTRAMUSCULAR; INTRAVENOUS; SUBCUTANEOUS at 21:39

## 2018-01-01 RX ADMIN — ONDANSETRON 4 MG: 2 INJECTION INTRAMUSCULAR; INTRAVENOUS at 07:23

## 2018-01-01 RX ADMIN — OXYCODONE HYDROCHLORIDE 30 MG: 15 TABLET ORAL at 12:18

## 2018-01-01 RX ADMIN — GABAPENTIN 200 MG: 100 CAPSULE ORAL at 20:16

## 2018-01-01 RX ADMIN — HYDROMORPHONE HYDROCHLORIDE 1 MG: 10 INJECTION INTRAMUSCULAR; INTRAVENOUS; SUBCUTANEOUS at 02:10

## 2018-01-01 RX ADMIN — Medication 1 TABLET: at 08:25

## 2018-01-01 RX ADMIN — DOXEPIN HYDROCHLORIDE 25 MG: 25 CAPSULE ORAL at 20:47

## 2018-01-01 RX ADMIN — SODIUM CHLORIDE, PRESERVATIVE FREE 500 UNITS: 5 INJECTION INTRAVENOUS at 15:09

## 2018-01-01 RX ADMIN — SULFAMETHOXAZOLE AND TRIMETHOPRIM 160 MG: 800; 160 TABLET ORAL at 08:41

## 2018-01-01 RX ADMIN — PREDNISONE 20 MG: 20 TABLET ORAL at 08:16

## 2018-01-01 RX ADMIN — Medication 1 TABLET: at 08:41

## 2018-01-01 RX ADMIN — Medication 10 ML: at 13:46

## 2018-01-01 RX ADMIN — Medication 1 TABLET: at 09:04

## 2018-01-01 RX ADMIN — OXYCODONE HYDROCHLORIDE 15 MG: 15 TABLET ORAL at 17:15

## 2018-01-01 RX ADMIN — OXYCODONE HYDROCHLORIDE 30 MG: 15 TABLET ORAL at 02:06

## 2018-01-01 RX ADMIN — TAMSULOSIN HYDROCHLORIDE 0.4 MG: 0.4 CAPSULE ORAL at 21:38

## 2018-01-01 RX ADMIN — LIDOCAINE HYDROCHLORIDE 8 ML: 10 INJECTION, SOLUTION INFILTRATION; PERINEURAL at 10:38

## 2018-01-01 RX ADMIN — ALPRAZOLAM 0.5 MG: 0.5 TABLET ORAL at 09:41

## 2018-01-01 RX ADMIN — ALPRAZOLAM 0.5 MG: 0.5 TABLET ORAL at 22:41

## 2018-01-01 RX ADMIN — ALPRAZOLAM 0.5 MG: 0.5 TABLET ORAL at 09:09

## 2018-01-01 RX ADMIN — Medication 1 TABLET: at 09:51

## 2018-01-01 RX ADMIN — Medication 10 ML: at 08:44

## 2018-01-01 RX ADMIN — FAMOTIDINE 20 MG: 20 TABLET, FILM COATED ORAL at 07:02

## 2018-01-01 RX ADMIN — DOCUSATE SODIUM 100 MG: 100 CAPSULE, LIQUID FILLED ORAL at 21:09

## 2018-01-01 RX ADMIN — ROCURONIUM BROMIDE 30 MG: 10 INJECTION INTRAVENOUS at 16:34

## 2018-01-01 RX ADMIN — LIDOCAINE HYDROCHLORIDE 125 MG: 10 INJECTION, SOLUTION INFILTRATION; PERINEURAL at 07:23

## 2018-01-01 RX ADMIN — HYDROMORPHONE HYDROCHLORIDE 2 MG: 1 INJECTION, SOLUTION INTRAMUSCULAR; INTRAVENOUS; SUBCUTANEOUS at 00:18

## 2018-01-01 RX ADMIN — OXYCODONE HYDROCHLORIDE 30 MG: 15 TABLET ORAL at 08:16

## 2018-01-01 RX ADMIN — PREDNISONE 20 MG: 20 TABLET ORAL at 09:04

## 2018-01-01 RX ADMIN — OXYCODONE HYDROCHLORIDE 15 MG: 15 TABLET ORAL at 01:05

## 2018-01-01 RX ADMIN — OXYCODONE HYDROCHLORIDE 15 MG: 15 TABLET ORAL at 13:37

## 2018-01-01 RX ADMIN — MORPHINE SULFATE 10 MG: 100 SOLUTION ORAL at 22:33

## 2018-01-01 RX ADMIN — GABAPENTIN 200 MG: 100 CAPSULE ORAL at 09:28

## 2018-01-01 RX ADMIN — HYDROMORPHONE HYDROCHLORIDE 0.5 MG: 10 INJECTION INTRAMUSCULAR; INTRAVENOUS; SUBCUTANEOUS at 05:27

## 2018-01-01 RX ADMIN — FENTANYL CITRATE 50 MCG: 50 INJECTION INTRAMUSCULAR; INTRAVENOUS at 18:00

## 2018-01-01 RX ADMIN — POLYETHYLENE GLYCOL 3350 17 G: 17 POWDER, FOR SOLUTION ORAL at 09:29

## 2018-01-01 RX ADMIN — Medication 1 TABLET: at 08:16

## 2018-01-01 RX ADMIN — HYDROMORPHONE HYDROCHLORIDE 1 MG: 10 INJECTION INTRAMUSCULAR; INTRAVENOUS; SUBCUTANEOUS at 23:08

## 2018-01-01 RX ADMIN — HYDROMORPHONE HYDROCHLORIDE 1 MG: 10 INJECTION INTRAMUSCULAR; INTRAVENOUS; SUBCUTANEOUS at 05:37

## 2018-01-01 RX ADMIN — Medication 10 ML: at 15:09

## 2018-01-01 RX ADMIN — OXYCODONE HYDROCHLORIDE 30 MG: 15 TABLET ORAL at 01:18

## 2018-01-01 RX ADMIN — CEFTRIAXONE SODIUM 1 G: 1 INJECTION, SOLUTION INTRAVENOUS at 17:45

## 2018-01-01 RX ADMIN — DOXEPIN HYDROCHLORIDE 25 MG: 25 CAPSULE ORAL at 20:51

## 2018-01-01 RX ADMIN — OXYCODONE HYDROCHLORIDE 30 MG: 15 TABLET ORAL at 14:02

## 2018-01-01 RX ADMIN — OXYCODONE HYDROCHLORIDE 15 MG: 15 TABLET ORAL at 10:11

## 2018-01-01 RX ADMIN — DEXAMETHASONE SODIUM PHOSPHATE 12 MG: 10 INJECTION INTRAMUSCULAR; INTRAVENOUS at 14:02

## 2018-01-01 RX ADMIN — GABAPENTIN 200 MG: 100 CAPSULE ORAL at 08:13

## 2018-01-01 RX ADMIN — OXYCODONE HYDROCHLORIDE 30 MG: 15 TABLET ORAL at 05:19

## 2018-01-01 RX ADMIN — HYDROMORPHONE HYDROCHLORIDE 0.5 MG: 10 INJECTION INTRAMUSCULAR; INTRAVENOUS; SUBCUTANEOUS at 07:53

## 2018-01-01 RX ADMIN — GABAPENTIN 200 MG: 100 CAPSULE ORAL at 21:19

## 2018-01-01 RX ADMIN — HYDROMORPHONE HYDROCHLORIDE 0.5 MG: 10 INJECTION INTRAMUSCULAR; INTRAVENOUS; SUBCUTANEOUS at 17:45

## 2018-01-01 RX ADMIN — ALPRAZOLAM 0.5 MG: 0.5 TABLET ORAL at 01:34

## 2018-01-01 RX ADMIN — OXYCODONE HYDROCHLORIDE 15 MG: 15 TABLET ORAL at 13:44

## 2018-01-01 RX ADMIN — GABAPENTIN 200 MG: 100 CAPSULE ORAL at 20:32

## 2018-01-01 RX ADMIN — SULFAMETHOXAZOLE AND TRIMETHOPRIM 160 MG: 800; 160 TABLET ORAL at 23:49

## 2018-01-01 RX ADMIN — PREDNISONE 20 MG: 20 TABLET ORAL at 09:26

## 2018-01-01 RX ADMIN — GABAPENTIN 200 MG: 100 CAPSULE ORAL at 09:55

## 2018-01-01 RX ADMIN — CEFTRIAXONE SODIUM 1 G: 1 INJECTION, SOLUTION INTRAVENOUS at 10:04

## 2018-01-01 RX ADMIN — OXYCODONE HYDROCHLORIDE 30 MG: 15 TABLET ORAL at 16:54

## 2018-01-01 RX ADMIN — HYDROMORPHONE HYDROCHLORIDE 0.5 MG: 1 INJECTION, SOLUTION INTRAMUSCULAR; INTRAVENOUS; SUBCUTANEOUS at 20:52

## 2018-01-01 RX ADMIN — OXYCODONE HYDROCHLORIDE 30 MG: 15 TABLET ORAL at 12:45

## 2018-01-01 RX ADMIN — LORAZEPAM 0.5 MG: 2 INJECTION INTRAMUSCULAR; INTRAVENOUS at 10:40

## 2018-01-01 RX ADMIN — OXYCODONE HYDROCHLORIDE 30 MG: 15 TABLET ORAL at 21:14

## 2018-01-01 RX ADMIN — OXYCODONE HYDROCHLORIDE 30 MG: 15 TABLET ORAL at 22:47

## 2018-01-01 RX ADMIN — DOXEPIN HYDROCHLORIDE 25 MG: 25 CAPSULE ORAL at 20:16

## 2018-01-01 RX ADMIN — OXYCODONE HYDROCHLORIDE 30 MG: 15 TABLET ORAL at 20:51

## 2018-01-01 RX ADMIN — SODIUM CHLORIDE, PRESERVATIVE FREE 500 UNITS: 5 INJECTION INTRAVENOUS at 13:34

## 2018-01-01 RX ADMIN — DEXAMETHASONE SODIUM PHOSPHATE 12 MG: 10 INJECTION INTRAMUSCULAR; INTRAVENOUS at 14:15

## 2018-01-01 RX ADMIN — LIDOCAINE HYDROCHLORIDE 125 MG: 10 INJECTION, SOLUTION INFILTRATION; PERINEURAL at 02:02

## 2018-01-01 RX ADMIN — MIDAZOLAM 1 MG: 1 INJECTION INTRAMUSCULAR; INTRAVENOUS at 15:15

## 2018-01-01 RX ADMIN — DEXTROSE MONOHYDRATE 250 ML: 5 INJECTION, SOLUTION INTRAVENOUS at 14:02

## 2018-01-01 RX ADMIN — HYDROMORPHONE HYDROCHLORIDE 2 MG: 1 INJECTION, SOLUTION INTRAMUSCULAR; INTRAVENOUS; SUBCUTANEOUS at 20:20

## 2018-01-01 RX ADMIN — DOXEPIN HYDROCHLORIDE 25 MG: 25 CAPSULE ORAL at 21:13

## 2018-01-01 RX ADMIN — GABAPENTIN 200 MG: 100 CAPSULE ORAL at 21:39

## 2018-01-01 RX ADMIN — Medication 1 TABLET: at 09:55

## 2018-01-01 RX ADMIN — OXYCODONE HYDROCHLORIDE 30 MG: 15 TABLET ORAL at 09:28

## 2018-01-01 RX ADMIN — OXYCODONE HYDROCHLORIDE 15 MG: 15 TABLET ORAL at 04:13

## 2018-01-01 RX ADMIN — POLYETHYLENE GLYCOL 3350 17 G: 17 POWDER, FOR SOLUTION ORAL at 09:04

## 2018-01-01 RX ADMIN — ALPRAZOLAM 0.5 MG: 0.5 TABLET ORAL at 10:15

## 2018-01-01 RX ADMIN — TAMSULOSIN HYDROCHLORIDE 0.4 MG: 0.4 CAPSULE ORAL at 21:06

## 2018-01-01 RX ADMIN — SODIUM CHLORIDE, POTASSIUM CHLORIDE, SODIUM LACTATE AND CALCIUM CHLORIDE: 600; 310; 30; 20 INJECTION, SOLUTION INTRAVENOUS at 16:30

## 2018-01-01 RX ADMIN — GABAPENTIN 200 MG: 100 CAPSULE ORAL at 08:16

## 2018-01-01 RX ADMIN — GABAPENTIN 200 MG: 100 CAPSULE ORAL at 09:20

## 2018-01-01 RX ADMIN — OXALIPLATIN 130 MG: 5 INJECTION, SOLUTION, CONCENTRATE INTRAVENOUS at 14:17

## 2018-01-01 RX ADMIN — POLYETHYLENE GLYCOL 3350 17 G: 17 POWDER, FOR SOLUTION ORAL at 08:32

## 2018-01-01 RX ADMIN — HYDROMORPHONE HYDROCHLORIDE 0.5 MG: 1 INJECTION, SOLUTION INTRAMUSCULAR; INTRAVENOUS; SUBCUTANEOUS at 18:15

## 2018-01-01 RX ADMIN — FAMOTIDINE 20 MG: 20 TABLET, FILM COATED ORAL at 10:00

## 2018-01-01 RX ADMIN — OXYCODONE HYDROCHLORIDE 30 MG: 15 TABLET ORAL at 15:06

## 2018-01-01 RX ADMIN — TAMSULOSIN HYDROCHLORIDE 0.4 MG: 0.4 CAPSULE ORAL at 21:50

## 2018-01-01 RX ADMIN — HYDROMORPHONE HYDROCHLORIDE 2 MG: 1 INJECTION, SOLUTION INTRAMUSCULAR; INTRAVENOUS; SUBCUTANEOUS at 16:56

## 2018-01-01 RX ADMIN — DEXAMETHASONE SODIUM PHOSPHATE 12 MG: 10 INJECTION, SOLUTION INTRAMUSCULAR; INTRAVENOUS at 13:45

## 2018-01-01 RX ADMIN — OXYCODONE HYDROCHLORIDE 30 MG: 15 TABLET ORAL at 18:13

## 2018-01-01 RX ADMIN — FAMOTIDINE 20 MG: 20 TABLET, FILM COATED ORAL at 06:51

## 2018-01-01 RX ADMIN — HYDROMORPHONE HYDROCHLORIDE 2 MG: 1 INJECTION, SOLUTION INTRAMUSCULAR; INTRAVENOUS; SUBCUTANEOUS at 04:49

## 2018-01-01 RX ADMIN — HYDROMORPHONE HYDROCHLORIDE 1 MG: 10 INJECTION INTRAMUSCULAR; INTRAVENOUS; SUBCUTANEOUS at 02:27

## 2018-01-01 RX ADMIN — HYDROMORPHONE HYDROCHLORIDE 1 MG: 10 INJECTION INTRAMUSCULAR; INTRAVENOUS; SUBCUTANEOUS at 23:14

## 2018-01-01 RX ADMIN — Medication 1 TABLET: at 08:56

## 2018-01-01 RX ADMIN — FAMOTIDINE 20 MG: 20 TABLET, FILM COATED ORAL at 08:56

## 2018-01-01 RX ADMIN — HYDROMORPHONE HYDROCHLORIDE 1 MG: 10 INJECTION INTRAMUSCULAR; INTRAVENOUS; SUBCUTANEOUS at 15:48

## 2018-01-01 RX ADMIN — OXYCODONE HYDROCHLORIDE 30 MG: 15 TABLET ORAL at 06:23

## 2018-01-01 RX ADMIN — DOXEPIN HYDROCHLORIDE 25 MG: 25 CAPSULE ORAL at 20:32

## 2018-01-01 RX ADMIN — GABAPENTIN 200 MG: 100 CAPSULE ORAL at 08:44

## 2018-01-01 RX ADMIN — ALPRAZOLAM 0.5 MG: 0.5 TABLET ORAL at 10:17

## 2018-01-01 RX ADMIN — PREDNISONE 20 MG: 20 TABLET ORAL at 08:13

## 2018-01-01 RX ADMIN — FAMOTIDINE 20 MG: 20 TABLET, FILM COATED ORAL at 10:03

## 2018-01-01 RX ADMIN — GABAPENTIN 200 MG: 100 CAPSULE ORAL at 21:24

## 2018-01-01 RX ADMIN — OXYCODONE HYDROCHLORIDE 15 MG: 15 TABLET ORAL at 20:21

## 2018-01-01 RX ADMIN — OXYCODONE HYDROCHLORIDE 30 MG: 15 TABLET ORAL at 09:20

## 2018-01-01 RX ADMIN — SODIUM CHLORIDE, POTASSIUM CHLORIDE, SODIUM LACTATE AND CALCIUM CHLORIDE 9 ML/HR: 600; 310; 30; 20 INJECTION, SOLUTION INTRAVENOUS at 15:15

## 2018-01-01 RX ADMIN — HYDROMORPHONE HYDROCHLORIDE 2 MG: 1 INJECTION, SOLUTION INTRAMUSCULAR; INTRAVENOUS; SUBCUTANEOUS at 06:48

## 2018-01-01 RX ADMIN — HYDROMORPHONE HYDROCHLORIDE 1 MG: 10 INJECTION INTRAMUSCULAR; INTRAVENOUS; SUBCUTANEOUS at 06:29

## 2018-01-01 RX ADMIN — TAMSULOSIN HYDROCHLORIDE 0.4 MG: 0.4 CAPSULE ORAL at 20:32

## 2018-01-01 RX ADMIN — OXYCODONE HYDROCHLORIDE 15 MG: 15 TABLET ORAL at 21:01

## 2018-01-01 RX ADMIN — DIATRIZOATE MEGLUMINE AND DIATRIZOATE SODIUM 30 ML: 660; 100 LIQUID ORAL; RECTAL at 12:00

## 2018-01-01 RX ADMIN — TAMSULOSIN HYDROCHLORIDE 0.4 MG: 0.4 CAPSULE ORAL at 21:24

## 2018-01-01 RX ADMIN — LORAZEPAM 0.5 MG: 2 INJECTION INTRAMUSCULAR; INTRAVENOUS at 10:31

## 2018-01-01 RX ADMIN — HYDROMORPHONE HYDROCHLORIDE 1 MG: 10 INJECTION INTRAMUSCULAR; INTRAVENOUS; SUBCUTANEOUS at 07:35

## 2018-01-01 RX ADMIN — HYDROMORPHONE HYDROCHLORIDE 2 MG: 1 INJECTION, SOLUTION INTRAMUSCULAR; INTRAVENOUS; SUBCUTANEOUS at 16:35

## 2018-01-01 RX ADMIN — GABAPENTIN 200 MG: 100 CAPSULE ORAL at 21:06

## 2018-01-01 RX ADMIN — ALPRAZOLAM 0.5 MG: 0.5 TABLET ORAL at 22:33

## 2018-01-01 RX ADMIN — Medication 1 TABLET: at 09:20

## 2018-01-01 RX ADMIN — HYDROMORPHONE HYDROCHLORIDE 2 MG: 1 INJECTION, SOLUTION INTRAMUSCULAR; INTRAVENOUS; SUBCUTANEOUS at 12:05

## 2018-01-01 RX ADMIN — ALPRAZOLAM 0.5 MG: 0.5 TABLET ORAL at 22:08

## 2018-01-01 RX ADMIN — TAMSULOSIN HYDROCHLORIDE 0.4 MG: 0.4 CAPSULE ORAL at 20:16

## 2018-01-01 RX ADMIN — HYDROMORPHONE HYDROCHLORIDE 0.5 MG: 10 INJECTION INTRAMUSCULAR; INTRAVENOUS; SUBCUTANEOUS at 05:23

## 2018-01-01 RX ADMIN — PALONOSETRON HYDROCHLORIDE 0.25 MG: 0.25 INJECTION INTRAVENOUS at 14:02

## 2018-01-01 RX ADMIN — OXYCODONE HYDROCHLORIDE 30 MG: 15 TABLET ORAL at 17:05

## 2018-01-01 RX ADMIN — OXYCODONE HYDROCHLORIDE 15 MG: 15 TABLET ORAL at 20:55

## 2018-01-01 RX ADMIN — OXYCODONE HYDROCHLORIDE 15 MG: 15 TABLET ORAL at 10:39

## 2018-01-01 RX ADMIN — DOXEPIN HYDROCHLORIDE 25 MG: 25 CAPSULE ORAL at 21:38

## 2018-01-01 RX ADMIN — PREDNISONE 20 MG: 20 TABLET ORAL at 09:20

## 2018-01-01 RX ADMIN — GABAPENTIN 200 MG: 100 CAPSULE ORAL at 21:09

## 2018-01-01 RX ADMIN — OXYCODONE HYDROCHLORIDE 30 MG: 15 TABLET ORAL at 10:00

## 2018-01-01 RX ADMIN — FAMOTIDINE 20 MG: 20 TABLET, FILM COATED ORAL at 06:20

## 2018-01-01 RX ADMIN — OXYCODONE HYDROCHLORIDE 30 MG: 15 TABLET ORAL at 01:27

## 2018-01-01 RX ADMIN — LIDOCAINE HYDROCHLORIDE 20 ML: 10 INJECTION, SOLUTION INFILTRATION; PERINEURAL at 11:26

## 2018-01-01 RX ADMIN — TAMSULOSIN HYDROCHLORIDE 0.4 MG: 0.4 CAPSULE ORAL at 20:55

## 2018-01-01 RX ADMIN — ALPRAZOLAM 0.5 MG: 0.5 TABLET ORAL at 08:41

## 2018-01-01 RX ADMIN — HYDROMORPHONE HYDROCHLORIDE 1 MG: 10 INJECTION INTRAMUSCULAR; INTRAVENOUS; SUBCUTANEOUS at 10:06

## 2018-01-01 RX ADMIN — OXYCODONE HYDROCHLORIDE 30 MG: 15 TABLET ORAL at 01:13

## 2018-01-01 RX ADMIN — PALONOSETRON HYDROCHLORIDE 0.25 MG: 0.25 INJECTION INTRAVENOUS at 14:14

## 2018-01-01 RX ADMIN — PREDNISONE 20 MG: 20 TABLET ORAL at 10:00

## 2018-01-01 RX ADMIN — Medication 1 TABLET: at 09:26

## 2018-01-01 RX ADMIN — PREDNISONE 20 MG: 20 TABLET ORAL at 09:55

## 2018-01-01 RX ADMIN — OXYCODONE HYDROCHLORIDE 15 MG: 15 TABLET ORAL at 16:38

## 2018-01-01 RX ADMIN — FAMOTIDINE 20 MG: 20 TABLET, FILM COATED ORAL at 08:01

## 2018-01-01 RX ADMIN — OXYCODONE HYDROCHLORIDE 15 MG: 15 TABLET ORAL at 02:41

## 2018-01-01 RX ADMIN — OXYCODONE HYDROCHLORIDE 30 MG: 15 TABLET ORAL at 13:17

## 2018-01-01 RX ADMIN — OXYCODONE HYDROCHLORIDE 30 MG: 15 TABLET ORAL at 17:51

## 2018-01-01 RX ADMIN — GABAPENTIN 200 MG: 100 CAPSULE ORAL at 09:51

## 2018-01-01 RX ADMIN — OXYCODONE HYDROCHLORIDE 30 MG: 15 TABLET ORAL at 20:16

## 2018-01-01 RX ADMIN — OXYCODONE HYDROCHLORIDE 30 MG: 15 TABLET ORAL at 02:02

## 2018-01-01 RX ADMIN — POLYETHYLENE GLYCOL 3350 17 G: 17 POWDER, FOR SOLUTION ORAL at 08:43

## 2018-01-01 RX ADMIN — TAMSULOSIN HYDROCHLORIDE 0.4 MG: 0.4 CAPSULE ORAL at 23:49

## 2018-01-01 RX ADMIN — OXYCODONE HYDROCHLORIDE 30 MG: 15 TABLET ORAL at 18:44

## 2018-01-01 RX ADMIN — DEXTROSE MONOHYDRATE 250 ML: 50 INJECTION, SOLUTION INTRAVENOUS at 14:00

## 2018-01-01 RX ADMIN — OXYCODONE HYDROCHLORIDE 15 MG: 15 TABLET ORAL at 21:38

## 2018-01-02 NOTE — PROGRESS NOTES
REASONS FOR FOLLOWUP:    Metastatic pancreatic cancer to liver and sacrum.  The patient has undergone successful plan of radiation treatment to the sacrum on 2 different occasions and stereotactic treatment for a liver metastasis, solitary.  The patient also has undergone therapy with Eloxatin/Xeloda successfully.    HISTORY OF PRESENT ILLNESS:   This patient is here today in company of his wife to continue therapy with Eloxatin along with Xeloda in the treatment of pancreatic cancer with liver and bone metastases. The good news is that the patient’s tumor marker had started to drop during the previous visit a month ago and this is the best news the patient could have. Goes along with the clinical picture the patient feels dramatically better. His appetite has improved. He has no cancer-related pain at this time and the pain is controlled with pain medicine through his pain specialist. The patient is having normal bowel activity, normal urination. No new issues in regard to kidney stones . He has not had any hematuria or urinary tract infection. I advised him to discontinue vitamin D at that time in consideration that this could be a culprit in regard to formation of kidney stones. The patient has had an excellent tolerance to Xeloda with no mucositis, diarrhea, palmar-plantar erythema. His hematological parameters have remained stable. The patient's overall performance status is dramatically better. He has not noticed any significant worsening of peripheral neuropathy.    Delaying his treatment because of low white count and low platelet count. Today, fortunately, all of them are up and what we will do is modify his treatment both for the Xeloda and the Eloxatin to be given to him every 3 weeks. Doses of the medicine will remain the same though. With the drop in the tumor markers, this treatment will remain ongoing knowing that Eloxatin so far does not modify in a negative way his peripheral  neuropathy.              PAST MEDICAL HISTORY:     1.  Morbid obesity.     2. History of hypertension.     3. Hyperlipidemia.     4. He has no history of diabetes, neither history of thyroid disease, coronary disease, and cardiac arrhythmia.    5.   He had a syncopal episode in December 2014.  Conclusion was that the patient had an episode of low blood pressure and these medicines were adjusted accordingly.     6. He has no history of colonoscopy in the past.     7. History of enlarged prostate with a normal PSA.     8. 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     9.   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  His diplopia improved spontaneously.     10. He has no history of previous surgeries.         HEMATOLOGIC/ONCOLOGIC HISTORY: History from previous dates can be found in the separate document.         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  We adjusted down the E  loxatin to 170 mg total, given his minor leukopenia and minor thrombocytopenia.          His pain medication will remain the same.  His doxepin will remain the same.  In regard to extravasation   of Eloxatin in the left upper extremity, local therapy will remain the same.      The patient is next seen in the office March 07, 2016 stable for all aspects.  We plan to continue chemotherapy at the same doses at this time and scan him in the next several months and follow-up      ALLERGIES:  No known drug allergies.         MEDICATIONS:  The current medication list was reviewed with the patient and updated in the EMR this date per the medical assistant.  Medication dosages and frequencies were confirmed to be accurate.     SOCIAL   HISTORY: The patient used to  work for [employer] for a long time, most of his life.  He retired many years ago.  He stays at home busy doing many activities, including fishing, mowing, gardening, and many other chores.  He was exposed to chemicals w  h  en he worked at [employer], but he used to have protective equipment. He is not aware of any other coworkers who have been exposed to anything, or diagnosed with cancer.  He has 3 stepchildren in good health.  He used to smoke for 20 years; he quit   30 years ago.  He used to smoke a pack of cigarettes a day.  He does not drink any alcohol.         FAMILY HISTORY:  His mother  as a consequence of primary liver cancer and she was diagnosed in .  He had a sister who had multiple myeloma and received care at [facility].  His wife and the children do not have any other significant illnesses.           REVIEW OF SYSTEMS:   General: no fever, chills, RESOLVED fatigue, and weight changes, and lack of appetite.  Eyes: no epiphora, xerophthalmia,conjunctivitis, pain, glaucoma, blurred vision, blindness, secretion, photophobia, proptosis, diplopia.  Ears: no otorrhea, tinnitus, otorrhagia, deafness, pain, vertigo.  Nose: no rhinorrhea, epistaxis, alteration in perception of odors, sinuses pressure.  Mouth: no alteration in gums or teeth,  ulcers, no difficulty with mastication or deglut ion, no odynophagia.  Neck: no masses or pain, no thyroid alterations, no pain in muscles or arteries, no carotid odynia, no crepitation.  Respiratory: no cough, sputum production, dyspnea, trepopnea, pleuritic pain, hemoptysis.  Heart: no syncope, irregularity, palpitations, angina, orthopnea, paroxysmal nocturnal dyspnea.  Vascular Venous: no tenderness,edema, palpable cords, postphlebitic syndrome, skin changes or ulcerations.  Vascular Arterial: no distal ischemia, claudication, gangrene, neuropathic ischemic pain, skin ulcers, paleness or cyanosis.  GI: no dysphagia, odynophagia, no  "regurgitation, no heartburn,no indigestion,no nausea,no vomiting,no hematemesis ,no melena,no jaundice,STAED distention, no obstipation,no enterorrhagia,no proctalgia,no anal  lesions, stated changes in bowel habits.  : no frequency, hesitancy, hematuria, discharge, pain.  Musculoskeletal: no muscle or tendon pain or inflammation, joint pain, edema, functional limitation as expected given his bone mets and neuropathic pain,no fasciculations, mass.  Neurologic: no headache, seizures, alterations on Craneal nerves, no NEW motor or senssory deficit, normal coordination, no alteration in memory, orientation, calculation,writting, verbal or written language.  Skin: no rashes, pruritus , no new changes in his left forearm lesion.  Psychiatric: no anxiety, depression, agitation, delusions, proper insight.        VITAL SIGNS:   Vitals:    01/02/18 1304   BP: 111/73   Pulse: 99   Resp: 16   Temp: 97.4 °F (36.3 °C)   TempSrc: Oral   SpO2: 95%   Weight: 104 kg (229 lb)   Height: 185.4 cm (72.99\")          PHYSICAL EXAMINATION:      GENERAL:  White male in no acute distress, lying on the bed on his side.  The degree of pain intensity was no more than 2 out of 10.     SKIN:  Lesion in the skin in his forearm anteriorly on the left side remains indurated with no pain, no eryth  ema, no local inflammation with normal range-of-motion in the flexion and extension of the left elbow.  He had no edema in his hand.  The area of infiltration of the skin measures close to 2 x 2 cm in size.  The rest of the skin examination reveals mild erythema with occasional papular lesion.       EYES:  Pupils were equal and reactive to light and accommodation.  Extraocular movements were normal.     MOUTH:  Oral mucosals were normal.    NECK:  Disclosed no palpable cervical adenopathies, no thyroid enlargement. CHEST:  Lungs were clear with good breath sounds bilaterally.  No wheezing, crackles, rhonchi or rubs.  His heart was regular with occasional " premature contractions.     ABDOMEN:  Soft, nontender, nodular  liver enlargement 1 cm brcm,.  No splenomegaly.  No palpable abdominal masses.  negative ascites.  No pain.     MUSCULOSKELETAL:  Disclosed minimal tenderness in the sacrum.  No soft tissue mass, no deformity, no local increase in the temperature.  No cauda equina syndrome.     EXTREMITIES: Disclosed no tenderness, no edema.  Left upper extremity as already described-improved from previous  NEUROLOGICAL:  Showed a patient that was fully awake, alert, oriented to time and place, able to carry a normal conversation, walking with some degree of difficulty given the intensity of the pain in the sacrum.         LABORATORY DATA:  Component      Latest Ref Rng & Units 9/19/2017 10/24/2017 11/21/2017 12/19/2017           1:58 PM  7:37 AM 12:46 PM 12:55 PM   CA 19-9      0 - 35 U/mL 361.8 (H) 202.3 (H) 178.6 (H) 155 (H)     Component      Latest Ref Rng & Units 12/5/2017 12/19/2017 12/26/2017 1/2/2018          11:18 AM 12:55 PM 12:52 PM  1:11 PM   WBC      4.50 - 10.70 10*3/mm3 4.45 (L) 3.08 (L) 2.98 (L) 3.40 (L)   RBC      4.60 - 6.00 10*6/mm3 3.09 (L) 2.98 (L) 3.19 (L) 3.17 (L)   Hemoglobin      13.7 - 17.6 g/dL 10.6 (L) 10.5 (L) 11.1 (L) 11.2 (L)   Hematocrit      40.4 - 52.2 % 32.2 (L) 31.6 (L) 33.5 (L) 33.1 (L)   MCV      79.8 - 96.2 fL 104.2 (H) 106.0 (H) 105.0 (H) 104.4 (H)   MCH      27.0 - 32.7 pg 34.3 (H) 35.2 (H) 34.8 (H) 35.3 (H)   MCHC      32.6 - 36.4 g/dL 32.9 33.2 33.1 33.8   RDW      11.5 - 14.5 % 21.2 (H) 21.8 (H) 20.5 (H) 19.7 (H)   RDW-SD      37.0 - 54.0 fl 80.3 (H) 85.1 (H) 80.1 (H) 77.7 (H)   MPV      6.0 - 12.0 fL 12.2 (H) 12.1 (H) 13.2 (H) 13.3 (H)   Platelets      140 - 500 10*3/mm3 109 (L) 82 (L) 86 (L) 104 (L)   Neutrophil %      42.7 - 76.0 % 72.4 67.2 64.5 68.3   Lymphocyte %      19.6 - 45.3 % 10.1 (L) 12.7 (L) 15.1 (L) 12.9 (L)   Monocyte %      5.0 - 12.0 % 14.6 (H) 15.9 (H) 15.4 (H) 13.2 (H)   Eosinophil %      0.3 - 6.2 % 2.0  3.9 3.7 4.4   Basophil %      0.0 - 1.5 % 0.7 0.3 1.0 0.9   Immature Grans %      0.0 - 0.5 % 0.2 0.0 0.3 0.3       ASSESSMENT:        1.This patient has pancreatic cancer with liver and bone metastasis undergoing at this time therapy with Xeloda 7 days on 7 days off and he takes the Xeloda the week that he receives the Eloxatine. The dose of Eloxatine needed to be modified during the previous visit due to minor worsening of peripheral neuropathy and since then he has not noticed any difference. He has no functional limitations with the neuropathy neither pain implications from this point of view. The patient feels dramatically better. His energy is good, his bowel activity is normal, urination is back to normal and he has no fevers, no chills, no bleeding on any level. He remains fully functional walking through the house able to take care of his own shower, getting dressed and doing activities with his wife when they ride in the car.     The pain is under excellent control through his PCA pump and pain specialist is Dr. Shaheed Bertrand.   Given the delay that he had to have in regard to chemotherapy administration because of low platelet count and low white count we advised the patient to modify his treatment in regard to the schedule receiving Eloxatine and Xeloda only every 3 weeks. Doses of the medicine will remain the same. His tumor marker has substantially improved and hopefully this will continue happening. So far Eloxatine has not produced any worsening of his previous peripheral neuropathy.     I will see him back in 6 weeks and he will come for treatment only in 3 weeks. He will initiate his Xeloda tablet today at the same dose as before and again the dose of Eloxatine will remain the same. The only difference is change in the frequency to every 3 weeks.     In regard to the pain control he will remain about the same and he will see Dr. Shaheed Bertrand at some point.    In regard to his kidney stone he has  discontinued his vitamin D as per my advice and he will be seeing Dr. Downs at some point in February. He has not had any other episodes.
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
## 2018-02-13 NOTE — PROGRESS NOTES
REASONS FOR FOLLOWUP:    Metastatic pancreatic cancer to liver and sacrum.  The patient has undergone successful plan of radiation treatment to the sacrum on 2 different occasions and stereotactic treatment for a liver metastasis, solitary.  The patient also has undergone therapy with Eloxatin/Xeloda successfully.    HISTORY OF PRESENT ILLNESS:   This patient was seen with his wife and his son present on the day of the office visit.  In summary, the patient during the last 3 weeks has had a progressive amount of discomfort in his abdomen with a large abdomen that is giving him a lot of weight and sensation of fullness and minor decrease in the ability to breathe because of the large volume.  Obviously, with this in mind he has had difficulty walking and actually he has noticed worsening peripheral neuropathy in his feet.  He has not had worsening peripheral neuropathy in his hands.  His appetite is about the same, bowel activity is okay, urination is okay in volume and he has not had too much swelling in his lower extremities.  He has not had any fevers, chills, cough or sputum production or modification otherwise in his pulmonary function.  He has not had any difficulties with his port.  He has been so weak that he had to be brought today in a wheelchair by his son to the office.                PAST MEDICAL HISTORY:     1.  Morbid obesity.     2. History of hypertension.     3. Hyperlipidemia.     4. He has no history of diabetes, neither history of thyroid disease, coronary disease, and cardiac arrhythmia.    5.   He had a syncopal episode in December 2014.  Conclusion was that the patient had an episode of low blood pressure and these medicines were adjusted accordingly.     6. He has no history of colonoscopy in the past.     7. History of enlarged prostate with a normal PSA.     8. 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      9.   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  His diplopia improved spontaneously.     10. He has no history of previous surgeries.         HEMATOLOGIC/ONCOLOGIC HISTORY: History from previous dates can be found in the separate document.         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  We adjusted down the E  loxatin to 170 mg total, given his minor leukopenia and minor thrombocytopenia.          His pain medication will remain the same.  His doxepin will remain the same.  In regard to extravasation   of Eloxatin in the left upper extremity, local therapy will remain the same.      The patient is next seen in the office 2016 stable for all aspects.  We plan to continue chemotherapy at the same doses at this time and scan him in the next several months and follow-up      ALLERGIES:  No known drug allergies.         MEDICATIONS:  The current medication list was reviewed with the patient and updated in the EMR this date per the medical assistant.  Medication dosages and frequencies were confirmed to be accurate.     SOCIAL   HISTORY: The patient used to work for [employer] for a long time, most of his life.  He retired many years ago.  He stays at home busy doing many activities, including fishing, mowing, gardening, and many other chores.  He was exposed to chemicals w  h  en he worked at [employer], but he used to have protective equipment. He is not aware of any other coworkers who have been exposed to anything, or diagnosed with cancer.  He has 3 stepchildren in good health.  He used to smoke for 20 years; he quit   30 years ago.  He used to smoke a pack of cigarettes a day.  He does not drink any alcohol.         FAMILY HISTORY:  His mother  as a consequence of  primary liver cancer and she was diagnosed in 1977.  He had a sister who had multiple myeloma and received care at [facility].  His wife and the children do not have any other significant illnesses.           REVIEW OF SYSTEMS:   General: no fever, chills,profound fatigue,and weight changes, and lack of appetite.  Eyes: no epiphora, xerophthalmia,conjunctivitis, pain, glaucoma, blurred vision, blindness, secretion, photophobia, proptosis, diplopia.  Ears: no otorrhea, tinnitus, otorrhagia, deafness, pain, vertigo.  Nose: no rhinorrhea, epistaxis, alteration in perception of odors, sinuses pressure.  Mouth: no alteration in gums or teeth,  ulcers, no difficulty with mastication or deglut ion, no odynophagia.  Neck: no masses or pain, no thyroid alterations, no pain in muscles or arteries, no carotid odynia, no crepitation.  Respiratory: no cough, sputum production,mild dyspnea, no trepopnea, pleuritic pain, hemoptysis.  Heart: no syncope, irregularity, palpitations, angina, orthopnea, paroxysmal nocturnal dyspnea.  Vascular Venous: no tenderness,edema, palpable cords, postphlebitic syndrome, skin changes or ulcerations.  Vascular Arterial: no distal ischemia, claudication, gangrene, neuropathic ischemic pain, skin ulcers, paleness or cyanosis.  GI: no dysphagia, odynophagia, no regurgitation, no heartburn,no indigestion,no nausea,no vomiting,no hematemesis ,no melena,no jaundice,progressive distention, no obstipation,no enterorrhagia,no proctalgia,no anal  lesions, no changes in bowel habits.  : no frequency, hesitancy, hematuria, discharge, pain.  Musculoskeletal: no muscle or tendon pain or inflammation, joint pain, edema, functional limitation, fasciculations, mass.  Neurologic: no headache, seizures, alterations on Craneal nerves, no motor  deficit, normal coordination, no alteration in memory, orientation, calculation,writting, verbal or written language.worsening feet neuropathy  Skin: no rashes, pruritus  "or localized lesions.  Psychiatric: no anxiety, depression, agitation, delusions, proper insight.       VITAL SIGNS:   Vitals:    02/13/18 1351   BP: 103/65   Pulse: 96   Resp: 16   Temp: 97.4 °F (36.3 °C)   TempSrc: Oral   SpO2: 96%   Height: 185.4 cm (72.99\")          PHYSICAL EXAMINATION:    GENERAL:  Well-developed, well-nourished  Patient in chronic illness  SKIN:  Warm, dry without rashes, purpura or petechiae.Pale  HEENT:  Pupils were equal and reactive to light and accomodation, conjunctivas non injected, no pterigion, normal extraocular movements, normal visual acuity.   Mouth mucosa was moist, no exudates in oropharynx, normal gum line, normal roof of the mouth and pillars, normal papillations of the tongue.Ear canals were normal, as well tympanic membranes, normal hearing acuity.No pain in mastoid area or erythema.  NECK:  Supple with good range of motion; no thyromegaly or masses, no JVD or bruits, no cervical adenopathies.No carotid arteries pain, no carotid abnormal pulsation or arterial dance.  LYMPHATICS:  No cervical, supraclavicular, axillary, epitrochlear or inguinal adenopathy.  CHEST:  Normal excursion of both jade thoraces, normal voice fremitus, no subcutaneous emphysema, normal axillas, no rashes or acanthosis nigricans. Lungs clear to percussion and auscultation, normal breath sounds bilaterally, no wheezing, crackles or ronchi, no stridor, no rubs.  CARDIAC AND VASCULAR: PMI not displaced,no thrills, normal rate and regular rhythm, without murmurs, rubs or S3 or S4 right or left sided gallops. Normal femoral, popliteal, pedis, brachial and carotid pulses.  ABDOMEN:  Soft, nontender with no organomegaly or masses, LARGE VOLUME ascites, MINIMAL collateral circulation,STATED distention,no Coy sign, MILD DIFFUSE abdominal pain, no inguinal hernias,SMALL umbilical hernia, no abdominal bruits. Normal bowel sounds.  GENITAL: Not  Performed.  EXTREMITIES  AND SPINE:  No clubbing, cyanosis or " edema, no deformities or pain .No kyphosis, scoliosis, deformities or pain in spine, ribs or pelvic bone.  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      Ref Range & Units 1d ago     WBC 4.50 - 10.70 10*3/mm3 4.87   RBC 4.60 - 6.00 10*6/mm3 3.04 (L)   Hemoglobin 13.7 - 17.6 g/dL 10.7 (L)   Hematocrit 40.4 - 52.2 % 32.4 (L)   MCV 79.8 - 96.2 fL 106.6 (H)   MCH 27.0 - 32.7 pg 35.2 (H)   MCHC 32.6 - 36.4 g/dL 33.0   RDW 11.5 - 14.5 % 19.3 (H)   RDW-SD 37.0 - 54.0 fl 74.9 (H)   MPV 6.0 - 12.0 fL 12.6 (H)   Platelets 140 - 500 10*3/mm3 148   Neutrophil % 42.7 - 76.0 % 71.0   Lymphocyte % 19.6 - 45.3 % 11.5 (L)   Monocyte % 5.0 - 12.0 % 14.0 (H)   Eosinophil % 0.3 - 6.2 % 2.5   Basophil % 0.0 - 1.5 % 0.6   Immature Grans % 0.0 - 0.5 % 0.4           Comprehensive Metabolic Panel   Order: [id number]   Status:  Final result   Visible to patient:  No (Not Released) Dx:  Pancreas carcinoma; 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      Ref Range & Units 1d ago     Glucose 65 - 99 mg/dL 108 (H)   BUN 8 - 23 mg/dL 13   Creatinine 0.76 - 1.27 mg/dL 0.74 (L)   Sodium 136 - 145 mmol/L 143   Potassium 3.5 - 5.2 mmol/L 3.6   Chloride 98 - 107 mmol/L 106   CO2 22.0 - 29.0 mmol/L 23.7   Calcium 8.6 - 10.5 mg/dL 8.5 (L)   Total Protein 6.0 - 8.5 g/dL 6.7   Albumin 3.50 - 5.20 g/dL 3.00 (L)   ALT (SGPT) 1 - 41 U/L 17   AST (SGOT) 1 - 40 U/L 47 (H)               CT ABDOMEN AND PELVIS WITH IV CONTRAST      HISTORY: 67-year-old male with metastatic pancreatic cancer with liver  and sacral metastases. Abdominal pain and bloating.      TECHNIQUE: Radiation dose reduction techniques were utilized, including  automated exposure control and exposure modulation based on body size.   3  mm images were obtained through the abdomen and pelvis after the  administration of IV contrast. Compared with previous CT from  10/31/2017.      FINDINGS: The liver lesions are ill-defined and difficult to measure.  4.0 x 3.0 cm lesion appears overall slightly smaller than previously  measuring 5.3 x 3.5 cm along the same planes. A lesion slightly  superiorly measures approximately 1.5 cm which is stable. A 2.8 cm  lesion at the inferior aspect of the medial hepatic segment previously  measured 3.0 cm. There is no biliary dilatation. The gallbladder appears  unremarkable. There is a significantly larger volume of ascites  throughout the abdomen and pelvis, currently moderately large volume.  There is fatty atrophy of the pancreas diffusely. There is no pancreatic  ductal dilatation. Splenic size is normal. The portal vein, splenic  vein, and SMV are patent. No acute bowel abnormality is seen. There are  moderate abdominal aortic atherosclerotic changes without aneurysmal  dilatation. There is a 7 mm right lower lobe pulmonary nodule which  previously measured 5 mm. There is also a new 3 mm right lower lobe  pulmonary nodule. A nodular density at the posterior right lung base is  stable. There is a tiny left pleural effusion and atelectatic change at  the left lower lobe related to elevation of the left hemidiaphragm, no  significant change.      IMPRESSION:  1. The ill-defined liver lesions are slightly smaller than previously,  but there has been interval increase in size of a 7 mm right lower lobe  pulmonary nodule and there is a new 3 mm right lower lobe pulmonary  nodule which may represent metastases. Conservative surveillance is  recommended.  2. Significant increase in moderately large volume of ascites throughout  the abdomen and pelvis.      This report was finalized on 2/7/2018 4:52 PM by Dr. Neyda Brooks MD.  ASSESSMENT:        Component      Latest Ref Rng & Units 9/19/2017 10/24/2017 11/21/2017  12/19/2017           1:58 PM  7:37 AM 12:46 PM 12:55 PM   CA 19-9      0 - 35 U/mL 361.8 (H) 202.3 (H) 178.6 (H) 155 (H)     ASSESSMENT/PLAN:      1.This patient has metastatic pancreatic cancer to the liver, peritoneal cavity, spine and pelvic bone and he has undergoing chemotherapy with Xeloda, 7 days on, 2 weeks off and Eloxatin every 3 weeks.  It is very obvious that today the patient has worsening peripheral neuropathy, sensory in nature, in his feet and he has great difficulty walking given the discomfort that he has with no obvious motor deficit documented today.  He has not developed any sensory neuropathy in his hands.     Therefore, the other phenomenon that is happening today is the progressive amount of ascites documented clinically and radiologically.  The patient has an enlarged spleen radiologically speaking even though the radiologist does not agree with that.  He has recently had thrombocytopenia and this is induced by the chemotherapy regimen that typically can produce cirrhosis of the liver, receiving chemotherapy now for close to 3-4 years.  In any event the patient is doing poorly and I think we need to proceed with a paracentesis to give him relief and get the largest volume possible under these circumstances.  I am going to reanalyze the fluid.  In the past the fluid CA 19-9 was normal, the cytology was positive for malignant cells and obviously we will need to redefine if the fluid presence translates to an exudate and for this reason a protein, LDH and white count will be done on the fluid as well as cytology and gram stain.     Also, I reviewed the medications with the patient’s wife and I want him to stop the Mobic that can favor water retention and I want to increase the Aldactone to 50 mg a day instead of 25 mg a day.  I asked him to have a low-sodium diet and I sent the prescription to the pharmacy for Bumex to take 0.5 mg a day once the paracentesis is done and monitor weight on a  daily basis and bring me the report next week for review.      Obviously, his chemotherapy treatment with Eloxatin will be put on hold.  It will be okay for the patient to go ahead and take the Xeloda whenever he is due for the next batch of the medicine.  I will be waiting to review the tumor marker that actually was dropping very nicely during the previous visit and we have a new number pending today.      The patient is willing to pursue.  Finally, I also reviewed the specimen for pathology from previous biopsy that is not amenable to any other Next-Generation Sequencing alterative.  This has already been reviewed by [facility] and the only medicine that was amenable in regard to intervention was everolimus and we will see how things evolve in the next few days and have further discussion about maybe needing to modify his treatment.    Hopefully, in relieving his ascites he will feel better and again, we will review him back next week.      From the point of view of his pain control this is appropriate and he will continue seeing Dr. Shaheed Bertrand for that purpose.     He has not had recrudescence of kidney stones, at least for now.  He is no longer receiving vitamin D.
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
## 2018-02-14 NOTE — NURSING NOTE
Verbal and written D/C instructions given to patient and wife.  They voice understanding and are able to teach back D/C instructions.  Patient eating lunch without problems.

## 2018-02-14 NOTE — DISCHARGE INSTRUCTIONS
EDUCATION /DISCHARGE INSTRUCTIONS  Paracentesis:  A needle is inserted into the space between your abdominal organs and the membrane that surrounds them (peritoneal space).  It is done for the diagnosis and treatment of fluid that is resistant to other therapies.  It helps determine the cause of the fluid and at the relieves pressure created by the fluid.  A sample is obtained and sent to the laboratory for study.    During the procedure:  You will lie on a bed on your back with your legs drawn up.  Your abdomen will be exposed from the chest to the pelvis.  You will otherwise be covered to maintain comfort.  A physician will clean your abdomen with antiseptic soap, place a sterile towel around the site and administer a local anesthetic to numb the area.  The physician will insert a needle into your abdominal wall.  There may be a popping sound which signifies the needle has pierced the abdominal wall. Next, the physician will attach tubing to transfer a sample into a collection bottle.  After the fluid is obtained the needle will be removed.  A pressure dressing is applied to the site.    Risks of the procedure include but are not limited to:   *  Bleeding    *  Wound infection   *  Low blood pressure   *  Decreased urination   *  Low sodium if a large amount of fluid is removed   *  Puncture of abdominal organs by the needle    Benefits of the procedure:  Benefits include the removal of fluid from the abdomen, relief of abdominal pressure and facilitation of a diagnosis.    Alternatives to the procedure:  Possible alternatives are diuretic drug therapy or surgery to place a shunt to drain fluid.  Risks of diuretic drug therapy include possible dehydration and renal failure.  The benefit of drug therapy is that it can be done at home under physician supervision.  Risks of shunt placement include exposure to anesthesia, infection, excessive bleeding and injury to abdominal organs.  The benefit of a shunt is that it  can be used to drain fluid over a longer period of time.  THIS EDUCATION INFORMATION WAS REVIEWED PRIOR TO THE PROCEDURE AND CONSENT. Patient initials__________________Time__1043_______________    Post procedure:    *  Weigh yourself daily.   *  Follow your doctors dietary instructions.   *  Rest today (no pushing pulling, straining or heavy lifting).   *  Slowly increase activity tomorow.   *  If you received sedation do not drive for 24 hours.              * Skin affix applied to puncture site. Do not try to remove, scratch or apply lotion   * Skin affix will fall off on it's own   *  You may shower tomorrow    Call your doctor if experiencing:   *  Signs of infection such as redness, swelling, excessive pain and / or foul       smelling drainage from the puncture site.   *  Chills or fever over 101 degrees (by mouth).   *  Fainting.   *  Rapid weight gain / loss.   *  Unrelieved pain.   *  Any new or severe symptoms.    Following the procedure:      Follow-up with the ordering physician as directed.   Continue to take other medications as directed by your physician unless    otherwise instructed.   If applicable, resume taking your blood thinners or Aspirin in 24 hours.    If you have any concerns please call the Radiology Nurses Desk at [phone].  You are the most important factor in your recovery.  Follow the above instructions carefully.

## 2018-02-16 NOTE — TELEPHONE ENCOUNTER
Patient's wife called to state patient has some small amount of leaking at site of skin affix from paracentesis on 2/14/18. Told wife that if she brought the patient up to xray triage we could apply more skin affix or fix with a different dressing. Patient's wife states she will bring him to radiology to fix site.

## 2018-02-20 NOTE — PROGRESS NOTES
REASONS FOR FOLLOWUP:    Metastatic pancreatic cancer to liver and sacrum.  The patient has undergone successful plan of radiation treatment to the sacrum on 2 different occasions and stereotactic treatment for a liver metastasis, solitary.  The patient also has undergone therapy with Eloxatin/Xeloda successfully.    HISTORY OF PRESENT ILLNESS:  Since the patient underwent a paracentesis and a total volume removal of 12L of fluid that was a transudate, the patient has been feeling dramatically better. On his scale at home, his weight has remained stable since last Wednesday to 199 and he has noticed some increased volume of urination and lesser sensation of fullness in the abdomen. For this reason, he has been able to mobilize much better now with 35 pounds of less weight in his system in comparison. The patient denies any nausea or vomiting. Appetite remains good. He has no fevers, no chills and he has no cardiovascular or respiratory issues. His neuropathy in his feet is about the same, nothing in his hands. He is mobilizing better because obviously he has lesser amount of weight on his system.               PAST MEDICAL HISTORY:     1.  Morbid obesity.     2. History of hypertension.     3. Hyperlipidemia.     4. He has no history of diabetes, neither history of thyroid disease, coronary disease, and cardiac arrhythmia.    5.   He had a syncopal episode in December 2014.  Conclusion was that the patient had an episode of low blood pressure and these medicines were adjusted accordingly.     6. He has no history of colonoscopy in the past.     7. History of enlarged prostate with a normal PSA.     8. 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     9.   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  His diplopia improved spontaneously.     10. He has no  history of previous surgeries.         HEMATOLOGIC/ONCOLOGIC HISTORY: History from previous dates can be found in the separate document.         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  We adjusted down the E  loxatin to 170 mg total, given his minor leukopenia and minor thrombocytopenia.          His pain medication will remain the same.  His doxepin will remain the same.  In regard to extravasation   of Eloxatin in the left upper extremity, local therapy will remain the same.      The patient is next seen in the office 2016 stable for all aspects.  We plan to continue chemotherapy at the same doses at this time and scan him in the next several months and follow-up      ALLERGIES:  No known drug allergies.         MEDICATIONS:  The current medication list was reviewed with the patient and updated in the EMR this date per the medical assistant.  Medication dosages and frequencies were confirmed to be accurate.     SOCIAL   HISTORY: The patient used to work for [employer] for a long time, most of his life.  He retired many years ago.  He stays at home busy doing many activities, including fishing, mowing, gardening, and many other chores.  He was exposed to chemicals w  h  en he worked at [employer], but he used to have protective equipment. He is not aware of any other coworkers who have been exposed to anything, or diagnosed with cancer.  He has 3 stepchildren in good health.  He used to smoke for 20 years; he quit   30 years ago.  He used to smoke a pack of cigarettes a day.  He does not drink any alcohol.         FAMILY HISTORY:  His mother  as a consequence of primary liver cancer and she was diagnosed in .  He had a sister who had multiple myeloma and received care at [facility].  His wife and the children do not have any other significant  illnesses.           REVIEW OF SYSTEMS:   General: no fever, chills, SOME fatigue,STABLE weight changes, or lack of appetite.  Eyes: no epiphora, xerophthalmia,conjunctivitis, pain, glaucoma, blurred vision, blindness, secretion, photophobia, proptosis, diplopia.  Ears: no otorrhea, tinnitus, otorrhagia, deafness, pain, vertigo.  Nose: no rhinorrhea, epistaxis, alteration in perception of odors, sinuses pressure.  Mouth: no alteration in gums or teeth,  ulcers, no difficulty with mastication or deglut ion, no odynophagia.  Neck: no masses or pain, no thyroid alterations, no pain in muscles or arteries, no carotid odynia, no crepitation.  Respiratory: no cough, sputum production, dyspnea, trepopnea, pleuritic pain, hemoptysis.  Heart: no syncope, irregularity, palpitations, angina, orthopnea, paroxysmal nocturnal dyspnea.  Vascular Venous: no tenderness,edema, palpable cords, postphlebitic syndrome, skin changes or ulcerations.  Vascular Arterial: no distal ischemia, claudication, gangrene, neuropathic ischemic pain, skin ulcers, paleness or cyanosis.  GI: no dysphagia, odynophagia, no regurgitation, no heartburn,no indigestion,no nausea,no vomiting,no hematemesis ,no melena,no jaundice,MUCH BETTER distention, no obstipation,no enterorrhagia,no proctalgia,no anal  lesions, nochanges in bowel habits.  : no frequency, hesitancy, hematuria, discharge, pain.  Musculoskeletal: no muscle or tendon pain or inflammation, joint pain, edema, functional limitation, fasciculations, mass.  Neurologic: no headache, seizures, alterations on Craneal nerves,  STATED senssory deficit, POOR coordination, no alteration in memory, orientation, calculation,writting, verbal or written language.  Skin: no rashes, pruritus or localized lesions.  Psychiatric: no anxiety, depression, agitation, delusions, proper insight.       VITAL SIGNS:   Vitals:    02/20/18 1515   BP: 98/62   Pulse: 83   Resp: 16   Temp: 97.6 °F (36.4 °C)   TempSrc: Oral    SpO2: 95%   Weight: 95.3 kg (210 lb 1.6 oz)          PHYSICAL EXAMINATION:    GENERAL:  Well-developed, well-nourished  Patient  in no acute distress.   SKIN:  Warm, dry without rashes, purpura or petechiae.  HEENT:  Pupils were equal and reactive to light and accomodation, conjunctivas non injected, no pterigion, normal extraocular movements, normal visual acuity.   Mouth mucosa was moist, no exudates in oropharynx, normal gum line, normal roof of the mouth and pillars, normal papillations of the tongue.Ear canals were normal, as well tympanic membranes, normal hearing acuity.No pain in mastoid area or erythema.  NECK:  Supple with good range of motion; no thyromegaly or masses, no JVD or bruits, no cervical adenopathies.No carotid arteries pain, no carotid abnormal pulsation or arterial dance.  LYMPHATICS:  No cervical, supraclavicular, axillary, epitrochlear or inguinal adenopathy.  CHEST:  Normal excursion of both jade thoraces, normal voice fremitus, no subcutaneous emphysema, normal axillas, no rashes or acanthosis nigricans. Lungs clear to percussion and auscultation, normal breath sounds bilaterally, no wheezing, crackles or ronchi, no stridor, no rubs.  CARDIAC AND VASCULAR: PMI not displaced,no thrills, normal rate and regular rhythm, without murmurs, rubs or S3 or S4 right or left sided gallops. Normal femoral, popliteal, pedis, brachial and carotid pulses.  ABDOMEN:  Soft, nontender with no organomegaly or masses, LESSER VOLUME ascites, SOME collateral circulation,no distention,no Coy sign, no abdominal pain, no inguinal hernias,no umbilical hernias, no abdominal bruits. Normal bowel sounds.  GENITAL: Not  Performed.  EXTREMITIES  AND SPINE:  No clubbing, cyanosis or edema, no deformities or pain .No kyphosis, scoliosis, deformities or pain in spine, ribs or pelvic bone.  NEUROLOGICAL:  Patient was awake, alert, oriented to time, person and place.SENSORY NEUROPATHY IN FEET.      LABORATORY  DATA:ULTRASOUND-GUIDED PARACENTESIS 02/14/2018      HISTORY: Ascites.      After signed informed consent was obtained, the left lower quadrant was  prepped and draped in the usual sterile fashion. Lidocaine was used for  local anesthesia.      Ultrasound guidance was used to place the paracentesis catheter into the  left lower quadrant peritoneal fluid collection. 13,800 mL of ascites  was removed.      Confirmatory images were obtained.      Patient tolerated the procedure well with no complications.      This report was finalized on 2/16/2018 4:23 PM by Dr. Margarito Mcrae MD.    Protein, Body Fluid - Body Fluid, Peritoneum   Order: [id number]   Status:  Final result   Visible to patient:  No (Not Released) Dx:  Malignant ascites      Ref Range & Units 6d ago     Protein, Total, Fluid g/dL 1.3   Resulting Agency   [facility]   Narrative   No Reference Ranges Established.                Lactate Dehydrogenase, Body Fluid - Body Fluid, Peritoneum   Order: [id number]   Status:  Final result   Visible to patient:  No (Not Released) Dx:  Malignant ascites      Ref Range & Units 6d ago     Lactate Dehydrogenase (LD), Fluid U/L 46   Resulting Agency              Body fluid cell count - Body Fluid,   Order: [id number] - Part of Panel Order [id number]   Status:  Final result   Visible to patient:  No (Not Released) Dx:  Malignant ascites      Ref Range & Units 6d ago     Color, Fluid  Yellow   Appearance, Fluid Clear Clear   WBC, Fluid /mm3 59   RBC, Fluid /mm3 23   Resulting Agency  [facility]      Specimen Collected: 02/14/18 11:16 AM Last Resulted: 02/14                Gram Stain   Order: [id number]   Status:  Final result   Visible to patient:  No (Not Released)   Specimen Information: Peritoneum; Body Fluid        Stain   Few (2+) WBCs seen      No organisms seen            Resulting Agency: [facility]         Non-gynecologic Cytology - Body Fluid, Peritoneum   Order: [id number]   Status:  Final result   Visible to  patient:  No (Not Released) Dx:  Malignant ascites      6d ago     Final Diagnosis   1. ASCITES (THIN PREP):                         MESOTHELIAL CELLS, HISTIOCYTES, AND SCATTERED LYMPHOCYTES.              Component      Latest Ref Rng & Units 2017 10/24/2017 2017 2017           1:58 PM  7:37 AM 12:46 PM 12:55 PM   CA 19-9      0 - 35 U/mL 361.8 (H) 202.3 (H) 178.6 (H) 155 (H)     Component      Latest Ref Rng & Units 2018           1:35 PM   CA 19-9      0 - 35 U/mL 229 (H)           ASSESSMENT/PLAN:      1.This patient has metastatic pancreatic cancer to the liver, peritoneal cavity, spine and pelvic bone and he has undergoing chemotherapy with Xeloda, 7 days on, 2 weeks off and Eloxatin every 3 weeks. During the previous visit, it was very obvious that the patient had a large volume of ascites and we decided with the previous history of a positive cytology in the peritoneal fluid for malignancy to proceed with a new paracentesis for therapeutic and diagnostic purposes. It turned out to be that they removed a total of 12.5L of fluid and this fluid was a transudate with a low protein content, a low LDH, low cellularity, negative Gram stain and negative cytology. Therefore, in my opinion the fluid corresponds to the patient’s thought process that has developed cirrhosis associated with previous chemotherapy medicine. Obviously, this is not curable and is not a reversible process and we have advised the patient and wife that we need to learn with a new reality. Therefore, under these circumstances, we advised them the followin. Chemotherapy treatment wise, he will remain on Xeloda single agent; next batch of the medicine started in a couple of weeks. He completed the last dose of the last tablet last night. The dose of the Xeloda will remain ongoing. We are not going to put anymore Eloxatine into this unless that his neuropathy in the long run gets some better.   2. The patient will learn  to get weight on daily basis and monitor sodium ingestion and liquid ingestion. He is at 199 pounds on his scale at home and we want for him to remain at this. If he loses more weight, means that he is taking too much diuretic. If he gains weight, means that he is not taking enough diuretic. The wife will be instrumental in taking care of this issue from now on.     In regard to diuretic he used, the patient will use Aldactone 50 mg p.o. daily, new prescription sent to the pharmacy, and he will remain on 0.5 mg of Bumex also in the morning.     I encouraged the patient to keep appointments with all the physicians including Shaheed Bertrand MD, to control his pain through his implantable pump. He is not requiring any extra pain medication by mouth now days. The dose of the pump was increased during the previous visit.     So far, the patient has not developed any kidney stones that are new but I would not be surprised if this happens again in the future. He is aware that this is a real possibility. He already has seen Dr. Downs a few days ago. No other intervention is necessary from this point of view.     I made the patient aware as well as the wife, the raise in the tumor marker. I do not have any other medication to offer besides the consideration of everolimus in the long run according to the studies of [facility] done before. Right now I think we are okay like we are and I am not going to pursue everolimus because that medicine can favor a lot more side effects than he is receiving at this time. If we see progressive disease documented by other radiological means, maybe we will consider this at some point.     We do not have any more tissue available for MSI and that will be another issue in the close future. If we need to pursue more tissue analysis, we could take a sample from the metastasis in the pelvic bone.

## 2018-02-22 NOTE — TELEPHONE ENCOUNTER
Patients wife called to clarify when he started back on xeloda. Per ' note, pt to start in a couple weeks--pt does one week on two weeks off. Will start week of march 6th. Pt's wife v/u.

## 2018-03-19 NOTE — PROGRESS NOTES
REASONS FOR FOLLOWUP:    Metastatic pancreatic cancer to liver and sacrum.  The patient has undergone successful plan of radiation treatment to the sacrum on 2 different occasions and stereotactic treatment for a liver metastasis, solitary.  The patient also has undergone therapy with Eloxatin/Xeloda successfully.    HISTORY OF PRESENT ILLNESS: This patient is here today in company of his wife to continue therapy with Xeloda in the background of metastatic pancreatic cancer to the liver and bone. The patient is due to start a new round of this medication a week from today. So far he has not developed any mucositis, diarrhea, palmar plantar erythema. His energy level is acceptable. His pain is under excellent control at this time. His pump will be checked by Dr. Shaheed Bertrand at some point in April. The patient denies any other new issues in regard peripheral neuropathy that was triggered by the Oxaliplatin. This medicine remains on hold. The patient denies any trouble with ascites at this time and his weight has remained very stable at home. He takes his aldactone and this has produced a positive impact in regard to weight control and fluid accumulation. The patient denies any other new issues. He has terrific appetite, normal urination, no infections. No cardiorespiratory symptomatology.                PAST MEDICAL HISTORY:     1.  Morbid obesity.     2. History of hypertension.     3. Hyperlipidemia.     4. He has no history of diabetes, neither history of thyroid disease, coronary disease, and cardiac arrhythmia.    5.   He had a syncopal episode in December 2014.  Conclusion was that the patient had an episode of low blood pressure and these medicines were adjusted accordingly.     6. He has no history of colonoscopy in the past.     7. History of enlarged prostate with a normal PSA.     8. 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     9.   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  His diplopia improved spontaneously.     10. He has no history of previous surgeries.         HEMATOLOGIC/ONCOLOGIC HISTORY: History from previous dates can be found in the separate document.         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  We adjusted down the E  loxatin to 170 mg total, given his minor leukopenia and minor thrombocytopenia.          His pain medication will remain the same.  His doxepin will remain the same.  In regard to extravasation   of Eloxatin in the left upper extremity, local therapy will remain the same.      The patient is next seen in the office 2016 stable for all aspects.  We plan to continue chemotherapy at the same doses at this time and scan him in the next several months and follow-up      ALLERGIES:  No known drug allergies.         MEDICATIONS:  The current medication list was reviewed with the patient and updated in the EMR this date per the medical assistant.  Medication dosages and frequencies were confirmed to be accurate.     SOCIAL   HISTORY: The patient used to work for [employer] for a long time, most of his life.  He retired many years ago.  He stays at home busy doing many activities, including fishing, mowing, gardening, and many other chores.  He was exposed to chemicals w  h  en he worked at [employer], but he used to have protective equipment. He is not aware of any other coworkers who have been exposed to anything, or diagnosed with cancer.  He has 3 stepchildren in good health.  He used to smoke for 20 years; he quit   30 years ago.  He used to smoke a pack of cigarettes a day.  He does not drink any alcohol.         FAMILY HISTORY:  His mother  as a  consequence of primary liver cancer and she was diagnosed in 1977.  He had a sister who had multiple myeloma and received care at [facility].  His wife and the children do not have any other significant illnesses.           REVIEW OF SYSTEMS:   General: no fever, chills, STATED fatigue, weight changes, or lack of appetite.  Eyes: no epiphora, xerophthalmia,conjunctivitis, pain, glaucoma, blurred vision, blindness, secretion, photophobia, proptosis, diplopia.  Ears: no otorrhea, tinnitus, otorrhagia, deafness, pain, vertigo.  Nose: no rhinorrhea, epistaxis, alteration in perception of odors, sinuses pressure.  Mouth: no alteration in gums or teeth,  ulcers, no difficulty with mastication or deglut ion, no odynophagia.  Neck: no masses or pain, no thyroid alterations, no pain in muscles or arteries, no carotid odynia, no crepitation.  Respiratory: no cough, sputum production, dyspnea, trepopnea, pleuritic pain, hemoptysis.  Heart: no syncope, irregularity, palpitations, angina, orthopnea, paroxysmal nocturnal dyspnea.  Vascular Venous: no tenderness,edema, palpable cords, postphlebitic syndrome, skin changes or ulcerations.  Vascular Arterial: no distal ischemia, claudication, gangrene, neuropathic ischemic pain, skin ulcers, paleness or cyanosis.  GI: no dysphagia, odynophagia, no regurgitation, no heartburn,no indigestion,no nausea,no vomiting,no hematemesis ,no melena,no jaundice,STATED distention, no obstipation,no enterorrhagia,no proctalgia,no anal  lesions, nochanges in bowel habits.  : no frequency, hesitancy, hematuria, discharge, pain.  Musculoskeletal: no muscle or tendon pain or inflammation, joint pain, edema, functional limitation, fasciculations, mass.  Neurologic: no headache, seizures, alterations on Craneal nerves, no motor or senssory deficit, normal coordination, no alteration in memory, orientation, calculation,writting, verbal or written language.  Skin: no rashes, pruritus or localized  "lesions.  Psychiatric: no anxiety, depression, agitation, delusions, proper insight.       VITAL SIGNS:   Vitals:    03/19/18 1307   BP: 98/61   Pulse: 88   Resp: 18   Temp: 97.7 °F (36.5 °C)   TempSrc: Oral   SpO2: 96%   Weight: 96.6 kg (213 lb)   Height: 185.4 cm (72.99\")          PHYSICAL EXAMINATION:    GENERAL:  Well-developed, well-nourished  Patient  in no acute distress.   SKIN:  Warm, dry without rashes, purpura or petechiae.  HEENT:  Pupils were equal and reactive to light and accomodation, conjunctivas non injected, no pterigion, normal extraocular movements, normal visual acuity.   Mouth mucosa was moist, no exudates in oropharynx, normal gum line, normal roof of the mouth and pillars, normal papillations of the tongue.Ear canals were normal, as well tympanic membranes, normal hearing acuity.No pain in mastoid area or erythema.  NECK:  Supple with good range of motion; no thyromegaly or masses, no JVD or bruits, no cervical adenopathies.No carotid arteries pain, no carotid abnormal pulsation or arterial dance.  LYMPHATICS:  No cervical, supraclavicular, axillary, epitrochlear or inguinal adenopathy.  CHEST:  Normal excursion of both jade thoraces, normal voice fremitus, no subcutaneous emphysema, normal axillas, no rashes or acanthosis nigricans. Lungs clear to percussion and auscultation, normal breath sounds bilaterally, no wheezing, crackles or ronchi, no stridor, no rubs.  CARDIAC AND VASCULAR: PMI not displaced,no thrills, normal rate and regular rhythm, without murmurs, rubs or S3 or S4 right or left sided gallops. Normal femoral, popliteal, pedis, brachial and carotid pulses.  ABDOMEN:  Soft, nontender with no organomegaly or masses, OBVIOUS ascites, STATED collateral circulation,STATED distention,no Shepherd sign, no abdominal pain, no inguinal hernias,no umbilical hernias, no abdominal bruits. Normal bowel sounds.  GENITAL: Not  Performed.  EXTREMITIES  AND SPINE:  No clubbing, cyanosis or edema, " no deformities or pain .No kyphosis, scoliosis, deformities or pain in spine, ribs or pelvic bone.  NEUROLOGICAL:  Patient was awake, alert, oriented to time, person and place, NEUROPATHY IN FEET, NO MOTOR DEFICIT        LABORATORY DATA:CBC Auto Differential   Order: [id number] - Part of Panel Order [id number]   Status:  Preliminary result   Visible to patient:  No (Not Released) Dx:  Pancreas carcinoma; 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    Ref Range & Units 13:02   WBC 4.50 - 10.70 10*3/mm3 5.41P    RBC 4.60 - 6.00 10*6/mm3 3.05P     Hemoglobin 13.7 - 17.6 g/dL 10.8P     Hematocrit 40.4 - 52.2 % 32.6P     MCV 79.8 - 96.2 fL 106.9P     MCH 27.0 - 32.7 pg 35.4P     MCHC 32.6 - 36.4 g/dL 33.1P    RDW 11.5 - 14.5 % 19.2P     RDW-SD 37.0 - 54.0 fl 74.0P     MPV 6.0 - 12.0 fL 12.9P     Platelets 140 - 500 10*3/mm3 157P    nRBC 0.0 - 0.0 /100 WBC 0.0P              ASSESSMENT/PLAN:1. This patient has pancreatic cancer with liver and bone metastasis and at this time is undergoing therapy with Xeloda single agent and the dose has remained the same since the previous visit. The patient is due to start a new batch of the medicine next Monday. So far this dose is not effecting or producing mucositis, diarrhea, palmar plantar erythema or significant hematological toxicity. This medicine seems to be that is controlling the disease process in the best way possible.   2. The patient has ascites. The ascites during the paracentesis was a transudate with negative cytology, negative culture and is presently controlled with a minimal dose of aldactone that he takes every 2nd or 3rd day. His weight remains stable. He is controlling his sodium ingestion and I think overall he continues doing well from this point of view. They bought a new scale and this is keeping him at the proper level in regard to weight control.  3. The patient has had leukopenia and thrombocytopenia in part related to chemotherapy and splenomegaly. The splenomegaly is the result  of cirrhosis of the liver associated with previous chemotherapy treatments. So far the blood counts remain stable and actually there are no issues in this regard at this point.   4. Pain control. The patient continues seeing Dr. Shaheed Bertrand for this. He will remain on the same care plan. He has an infusible pump in the abdominal wall in the right inferior right lower quadrant that remains fully functional. The patient continues doing an excellent job taking care of his pain.  5. Port flush. It has been flushed today with no difficulties, no evidence of alterations in regard to blood return.    RECOMMENDATIONS:  1. It will be okay for the patient to return to see us in a month.  2. It will be okay for the patient to resume his Xeloda dosing next Monday  3. It will be okay for the patient to remain on his diuretic as stated above and watching his sodium ingestion.  4. He will see Dr. Bertrand at some point in April. I will review him back in a month.

## 2018-04-17 NOTE — PROGRESS NOTES
REASONS FOR FOLLOWUP:    Metastatic pancreatic cancer to liver and sacrum.  The patient has undergone successful plan of radiation treatment to the sacrum on 2 different occasions and stereotactic treatment for a liver metastasis, solitary.  The patient also has undergone therapy with Eloxatin/Xeloda successfully.    HISTORY OF PRESENT ILLNESS: The patient returns today to the office in company of his wife stating that during the last month he has had a little bit more fatigue. He has had minor increase in the weight but is staying about 214 most of the time. He is very good in regard to his sodium consumption. His wife is giving him extra diuretic every other day on a prn basis. He denies any abdominal distention or jaundice, normal bowel activity, normal urination, no fluid accumulation in his lower extremities. No cardiorespiratory symptomatology. His pain is under excellent control per Shaheed Bertrand MD.  He has an appointment with him to refill his pain pump. The patient denies any other new problems with the exception of the chronic peripheral neuropathy in his feet that is unchanged. He is starting a new round of Xeloda today. We have seen a rise in his tumor marker CA 19-9 and for that reason I propose for him today to receive Oxaliplatin today and repeat it again in 1 month and go in that way through tumor markers and define how many cycles he will need to take. Likely 2-3 cycles on this schedule once a month and maybe thereafter he can go back onto just plain Xeloda. We are trying to balance the best quality of life possible with some degree of control of his disease. He cannot handle any CPT-11.                PAST MEDICAL HISTORY:     1.  Morbid obesity.     2. History of hypertension.     3. Hyperlipidemia.     4. He has no history of diabetes, neither history of thyroid disease, coronary disease, and cardiac arrhythmia.    5.   He had a syncopal episode in December 2014.  Conclusion was  that the patient had an episode of low blood pressure and these medicines were adjusted accordingly.     6. He has no history of colonoscopy in the past.     7. History of enlarged prostate with a normal PSA.     8. 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     9.   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  His diplopia improved spontaneously.     10. He has no history of previous surgeries.         HEMATOLOGIC/ONCOLOGIC HISTORY: History from previous dates can be found in the separate document.         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  We adjusted down the E  loxatin to 170 mg total, given his minor leukopenia and minor thrombocytopenia.          His pain medication will remain the same.  His doxepin will remain the same.  In regard to extravasation   of Eloxatin in the left upper extremity, local therapy will remain the same.      The patient is next seen in the office March 07, 2016 stable for all aspects.  We plan to continue chemotherapy at the same doses at this time and scan him in the next several months and follow-up      ALLERGIES:  No known drug allergies.         MEDICATIONS:  The current medication list was reviewed with the patient and updated in the EMR this date per the medical assistant.  Medication dosages and frequencies were confirmed to be accurate.     SOCIAL   HISTORY: The patient used to work for [employer] for a long time, most of his life.  He retired many years ago.  He stays at home busy doing many activities, including fishing, mowing, gardening, and many other chores.  He was exposed to chemicals w  h  en he worked at [employer], but he used to have protective equipment. He is  not aware of any other coworkers who have been exposed to anything, or diagnosed with cancer.  He has 3 stepchildren in good health.  He used to smoke for 20 years; he quit   30 years ago.  He used to smoke a pack of cigarettes a day.  He does not drink any alcohol.         FAMILY HISTORY:  His mother  as a consequence of primary liver cancer and she was diagnosed in .  He had a sister who had multiple myeloma and received care at [facility].  His wife and the children do not have any other significant illnesses.           REVIEW OF SYSTEMS:   General: no fever, chills, fatigue, weight changes, or lack of appetite.  Eyes: no epiphora, xerophthalmia,conjunctivitis, pain, glaucoma, blurred vision, blindness, secretion, photophobia, proptosis, diplopia.  Ears: no otorrhea, tinnitus, otorrhagia, deafness, pain, vertigo.  Nose: no rhinorrhea, epistaxis, alteration in perception of odors, sinuses pressure.  Mouth: no alteration in gums or teeth,  ulcers, no difficulty with mastication or deglut ion, no odynophagia.  Neck: no masses or pain, no thyroid alterations, no pain in muscles or arteries, no carotid odynia, no crepitation.  Respiratory: no cough, sputum production, dyspnea, trepopnea, pleuritic pain, hemoptysis.  Heart: no syncope, irregularity, palpitations, angina, orthopnea, paroxysmal nocturnal dyspnea.  Vascular Venous: no tenderness,edema, palpable cords, postphlebitic syndrome, skin changes or ulcerations.  Vascular Arterial: no distal ischemia, claudication, gangrene, neuropathic ischemic pain, skin ulcers, paleness or cyanosis.  GI: no dysphagia, odynophagia, no regurgitation, no heartburn,no indigestion,no nausea,no vomiting,no hematemesis ,no melena,no jaundice,no distention, no obstipation,no enterorrhagia,no proctalgia,no anal  lesions, nochanges in bowel habits.  : no frequency, hesitancy, hematuria, discharge, pain.  Musculoskeletal: no muscle or tendon pain or inflammation, joint pain,  "edema, functional limitation, fasciculations, mass.  Neurologic: no headache, seizures, alterations on Craneal nerves, no motor or senssory deficit, normal coordination, no alteration in memory, orientation, calculation,writting, verbal or written language.  Skin: no rashes, pruritus or localized lesions.  Psychiatric: no anxiety, depression, agitation, delusions, proper insight.       VITAL SIGNS:   Vitals:    04/17/18 1239   BP: 108/71   Pulse: 77   Resp: 18   Temp: 97.4 °F (36.3 °C)   TempSrc: Oral   Weight: 100 kg (220 lb 9.6 oz)   Height: 185.4 cm (72.99\")          PHYSICAL EXAMINATION:    GENERAL:  Well-developed, well-nourished  Patient  in no acute distress.   SKIN:  Warm, dry without rashes, purpura or petechiae.  HEENT:  Pupils were equal and reactive to light and accomodation, conjunctivas non injected, no pterigion, normal extraocular movements, normal visual acuity.   Mouth mucosa was moist, no exudates in oropharynx, normal gum line, normal roof of the mouth and pillars, normal papillations of the tongue.Ear canals were normal, as well tympanic membranes, normal hearing acuity.No pain in mastoid area or erythema.  NECK:  Supple with good range of motion; no thyromegaly or masses, no JVD or bruits, no cervical adenopathies.No carotid arteries pain, no carotid abnormal pulsation or arterial dance.  LYMPHATICS:  No cervical, supraclavicular, axillary, epitrochlear or inguinal adenopathy.  CHEST:  Normal excursion of both jade thoraces, normal voice fremitus, no subcutaneous emphysema, normal axillas, no rashes or acanthosis nigricans. Lungs clear to percussion and auscultation, normal breath sounds bilaterally, no wheezing, crackles or ronchi, no stridor, no rubs.  CARDIAC AND VASCULAR: PMI not displaced,no thrills, normal rate and regular rhythm, without murmurs, rubs or S3 or S4 right or left sided gallops. Normal femoral, popliteal, pedis, brachial and carotid pulses.  ABDOMEN:  Soft, nontender with no " organomegaly or masses, no ascites, OBVIOUS collateral circulation,no distention,no Coy sign, no abdominal pain, no inguinal hernias,no umbilical hernias, no abdominal bruits. Normal bowel sounds.  GENITAL: Not  Performed.  EXTREMITIES  AND SPINE:  No clubbing, cyanosis or edema, no deformities or pain .No kyphosis, scoliosis, deformities or pain in spine, ribs or pelvic bone.  NEUROLOGICAL:  Patient was awake, alert, oriented to time, person and place, SENSORY NEUROPATHY IN FEET.        LABORATORY DATA:  Component      Latest Ref Rng & Units 12/19/2017 2/13/2018 3/19/2018          12:55 PM  1:35 PM  1:02 PM   CA 19-9      <=35.0 U/mL 155 (H) 229 (H) 257.1 (H)     CBC Auto Differential   Order: [id number] - Part of Panel Order [id number]   Status:  Final result   Visible to patient:  No (Not Released) Dx:  Anemia in neoplastic disease; Splenom...    Ref Range & Units 12:34   WBC 4.50 - 10.70 10*3/mm3 6.76    RBC 4.60 - 6.00 10*6/mm3 3.19     Hemoglobin 13.7 - 17.6 g/dL 11.5     Hematocrit 40.4 - 52.2 % 34.4     MCV 79.8 - 96.2 fL 107.8     MCH 27.0 - 32.7 pg 36.1     MCHC 32.6 - 36.4 g/dL 33.4    RDW 11.5 - 14.5 % 17.7     RDW-SD 37.0 - 54.0 fl 68.6     MPV 6.0 - 12.0 fL 13.0     Platelets 140 - 500 10*3/mm3 158    Neutrophil % 42.7 - 76.0 % 78.8     Lymphocyte % 19.6 - 45.3 % 9.3     Monocyte % 5.0 - 12.0 % 9.2    Eosinophil % 0.3 - 6.2 % 2.2    Basophil % 0.0 - 1.5 % 0.4    Immature Grans % 0.0 - 0.5 % 0.1    Neutrophils, Absolute 1.90 - 8.10 10*3/mm3 5.32    Lymphocytes, Absolute 0.90 - 4.80 10*3/mm3 0.63                 ASSESSMENT/PLAN:1. This patient has pancreatic cancer with liver and bone metastasis and at this time is undergoing therapy with Xeloda single agent and the dose has remained the same since the previous visit. The patient is due to start a new batch of the medicine today. So far this dose is not effecting or producing mucositis, diarrhea, palmar plantar erythema or significant hematological  toxicity. This medicine seems to be that is controlling the disease process in the best way possible.   2. The patient has ascites. The ascites during the paracentesis was a transudate with negative cytology, negative culture and is presently controlled with a minimal dose of aldactone that he takes every 2nd or 3rd day. His weight remains stable. He is controlling his sodium ingestion and I think overall he continues doing well from this point of view. They bought a new scale and this is keeping him at the proper level in regard to weight control.  3. The patient has had leukopenia and thrombocytopenia in part related to chemotherapy and splenomegaly. The splenomegaly is the result of cirrhosis of the liver associated with previous chemotherapy treatments. So far the blood counts remain stable and actually there are no issues in this regard at this point.   4. Pain control. The patient continues seeing Dr. Shaheed Bertrand for this. He will remain on the same care plan. He has an infusible pump in the abdominal wall in the right inferior right lower quadrant that remains fully functional. The patient continues doing an excellent job taking care of his pain.  5. Port flush. It has been flushed today with no difficulties, no evidence of alterations in regard to blood return.    1. The patient has been advised given the rise in the tumor marker and his minor increase in the degree of fatigue to go ahead and proceed with Oxaliplatin today. We are trying to balance the benefit of this medicine besides the side effects including leukopenia, thrombocytopenia and forcing peripheral neuropathy. He agrees with that.  2. The patient will remain on his diuretics as stated above and his wife will continue controlling weight and measuring sodium ingestion on a very judicious way.   3. He will see Dr. Shaheed Bertrand in regard his pain control medication in the next couple of weeks. He is having proper support of this at this point.  4. I  encouraged the patient to remain active and try to walk around the house.  5. I asked him that if the ascites gets any worse he will require a new paracentesis. We can remove a large volume even 6-10 liters if necessary. We know that this ascites is a transudate that has probably no obvious connection with the cancer of the pancreas in the 1st place.    Otherwise no other intervention from my point of view.

## 2018-04-30 NOTE — TELEPHONE ENCOUNTER
Pt's wife called and said pt takes Xeloda for 7 days and off for 14 days. He is due to start his 7 days of Xeloda on the 8th and will finish on the 14th. He will likely get Oxaliplatin the day after on the 15th. She wanted to make sure his Xeloda schedule stays the same with the addition of the Oxaliplatin. In Basket message sent to Dr Manley.

## 2018-05-01 NOTE — TELEPHONE ENCOUNTER
Called to notify patient's wife of this. No answer. LVM       ----- Message from Fran Manley MD sent at 5/1/2018  8:25 AM EDT -----  Keep xeloda with same rebecca liek in the phone call

## 2018-05-11 NOTE — TELEPHONE ENCOUNTER
----- Message from Louisa Trujillo RN sent at 5/11/2018  4:37 PM EDT -----  So sorry paracentesis is for this patient.

## 2018-05-11 NOTE — TELEPHONE ENCOUNTER
Patient wife calling stated her  is very uncomfortable and needs a therapeutic paracentesis. verified with Dr. Manley and in basket message sent to Demi and gala.

## 2018-05-14 NOTE — DISCHARGE INSTRUCTIONS
EDUCATION /DISCHARGE INSTRUCTIONS  Paracentesis:  A needle is inserted into the space between your abdominal organs and the membrane that surrounds them (peritoneal space).  It is done for the diagnosis and treatment of fluid that is resistant to other therapies.  It helps determine the cause of the fluid and at the relieves pressure created by the fluid.  A sample is obtained and sent to the laboratory for study.    During the procedure:  You will lie on a bed on your back with your legs drawn up.  Your abdomen will be exposed from the chest to the pelvis.  You will otherwise be covered to maintain comfort.  A physician will clean your abdomen with antiseptic soap, place a sterile towel around the site and administer a local anesthetic to numb the area.  The physician will insert a needle into your abdominal wall.  There may be a popping sound which signifies the needle has pierced the abdominal wall. Next, the physician will attach tubing to transfer a sample into a collection bottle.  After the fluid is obtained the needle will be removed.  A pressure dressing is applied to the site.    Risks of the procedure include but are not limited to:   *  Bleeding    *  Wound infection   *  Low blood pressure   *  Decreased urination   *  Low sodium if a large amount of fluid is removed   *  Puncture of abdominal organs by the needle    Benefits of the procedure:  Benefits include the removal of fluid from the abdomen, relief of abdominal pressure and facilitation of a diagnosis.    Alternatives to the procedure:  Possible alternatives are diuretic drug therapy or surgery to place a shunt to drain fluid.  Risks of diuretic drug therapy include possible dehydration and renal failure.  The benefit of drug therapy is that it can be done at home under physician supervision.  Risks of shunt placement include exposure to anesthesia, infection, excessive bleeding and injury to abdominal organs.  The benefit of a shunt is that it  can be used to drain fluid over a longer period of time.  THIS EDUCATION INFORMATION WAS REVIEWED PRIOR TO THE PROCEDURE AND CONSENT. Patient initials__________________Time_________________    Post procedure:    *  Weigh yourself daily.   *  Follow your doctors dietary instructions.   *  Rest today (no pushing pulling, straining or heavy lifting).   *  Slowly increase activity tomorow.   *  If you received sedation do not drive for 24 hours.              * Skin affix applied to puncture site. Do not try to remove, scratch or apply lotion   * Skin affix will fall off on it's own   *  You may shower tomorrow    Call your doctor if experiencing:   *  Signs of infection such as redness, swelling, excessive pain and / or foul       smelling drainage from the puncture site.   *  Chills or fever over 101 degrees (by mouth).   *  Fainting.   *  Rapid weight gain / loss.   *  Unrelieved pain.   *  Any new or severe symptoms.    Following the procedure:      Follow-up with the ordering physician as directed.   Continue to take other medications as directed by your physician unless    otherwise instructed.   If applicable, resume taking your blood thinners or Aspirin in 24 hours.    If you have any concerns please call the Radiology Nurses Desk at [phone].  You are the most important factor in your recovery.  Follow the above instructions carefully.

## 2018-05-14 NOTE — H&P (VIEW-ONLY)
REASONS FOR FOLLOWUP:    Metastatic pancreatic cancer to liver and sacrum.  The patient has undergone successful plan of radiation treatment to the sacrum on 2 different occasions and stereotactic treatment for a liver metastasis, solitary.  The patient also has undergone therapy with Eloxatin/Xeloda successfully.    HISTORY OF PRESENT ILLNESS: The patient returns today to the office in company of his wife stating that during the last month he has had a little bit more fatigue. He has had minor increase in the weight but is staying about 214 most of the time. He is very good in regard to his sodium consumption. His wife is giving him extra diuretic every other day on a prn basis. He denies any abdominal distention or jaundice, normal bowel activity, normal urination, no fluid accumulation in his lower extremities. No cardiorespiratory symptomatology. His pain is under excellent control per Shaheed Bertrand MD.  He has an appointment with him to refill his pain pump. The patient denies any other new problems with the exception of the chronic peripheral neuropathy in his feet that is unchanged. He is starting a new round of Xeloda today. We have seen a rise in his tumor marker CA 19-9 and for that reason I propose for him today to receive Oxaliplatin today and repeat it again in 1 month and go in that way through tumor markers and define how many cycles he will need to take. Likely 2-3 cycles on this schedule once a month and maybe thereafter he can go back onto just plain Xeloda. We are trying to balance the best quality of life possible with some degree of control of his disease. He cannot handle any CPT-11.                PAST MEDICAL HISTORY:     1.  Morbid obesity.     2. History of hypertension.     3. Hyperlipidemia.     4. He has no history of diabetes, neither history of thyroid disease, coronary disease, and cardiac arrhythmia.    5.   He had a syncopal episode in December 2014.  Conclusion was  that the patient had an episode of low blood pressure and these medicines were adjusted accordingly.     6. He has no history of colonoscopy in the past.     7. History of enlarged prostate with a normal PSA.     8. 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     9.   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  His diplopia improved spontaneously.     10. He has no history of previous surgeries.         HEMATOLOGIC/ONCOLOGIC HISTORY: History from previous dates can be found in the separate document.         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  We adjusted down the E  loxatin to 170 mg total, given his minor leukopenia and minor thrombocytopenia.          His pain medication will remain the same.  His doxepin will remain the same.  In regard to extravasation   of Eloxatin in the left upper extremity, local therapy will remain the same.      The patient is next seen in the office March 07, 2016 stable for all aspects.  We plan to continue chemotherapy at the same doses at this time and scan him in the next several months and follow-up      ALLERGIES:  No known drug allergies.         MEDICATIONS:  The current medication list was reviewed with the patient and updated in the EMR this date per the medical assistant.  Medication dosages and frequencies were confirmed to be accurate.     SOCIAL   HISTORY: The patient used to work for [employer] for a long time, most of his life.  He retired many years ago.  He stays at home busy doing many activities, including fishing, mowing, gardening, and many other chores.  He was exposed to chemicals w  h  en he worked at [employer], but he used to have protective equipment. He is  not aware of any other coworkers who have been exposed to anything, or diagnosed with cancer.  He has 3 stepchildren in good health.  He used to smoke for 20 years; he quit   30 years ago.  He used to smoke a pack of cigarettes a day.  He does not drink any alcohol.         FAMILY HISTORY:  His mother  as a consequence of primary liver cancer and she was diagnosed in .  He had a sister who had multiple myeloma and received care at [facility].  His wife and the children do not have any other significant illnesses.           REVIEW OF SYSTEMS:   General: no fever, chills, fatigue, weight changes, or lack of appetite.  Eyes: no epiphora, xerophthalmia,conjunctivitis, pain, glaucoma, blurred vision, blindness, secretion, photophobia, proptosis, diplopia.  Ears: no otorrhea, tinnitus, otorrhagia, deafness, pain, vertigo.  Nose: no rhinorrhea, epistaxis, alteration in perception of odors, sinuses pressure.  Mouth: no alteration in gums or teeth,  ulcers, no difficulty with mastication or deglut ion, no odynophagia.  Neck: no masses or pain, no thyroid alterations, no pain in muscles or arteries, no carotid odynia, no crepitation.  Respiratory: no cough, sputum production, dyspnea, trepopnea, pleuritic pain, hemoptysis.  Heart: no syncope, irregularity, palpitations, angina, orthopnea, paroxysmal nocturnal dyspnea.  Vascular Venous: no tenderness,edema, palpable cords, postphlebitic syndrome, skin changes or ulcerations.  Vascular Arterial: no distal ischemia, claudication, gangrene, neuropathic ischemic pain, skin ulcers, paleness or cyanosis.  GI: no dysphagia, odynophagia, no regurgitation, no heartburn,no indigestion,no nausea,no vomiting,no hematemesis ,no melena,no jaundice,no distention, no obstipation,no enterorrhagia,no proctalgia,no anal  lesions, nochanges in bowel habits.  : no frequency, hesitancy, hematuria, discharge, pain.  Musculoskeletal: no muscle or tendon pain or inflammation, joint pain,  "edema, functional limitation, fasciculations, mass.  Neurologic: no headache, seizures, alterations on Craneal nerves, no motor or senssory deficit, normal coordination, no alteration in memory, orientation, calculation,writting, verbal or written language.  Skin: no rashes, pruritus or localized lesions.  Psychiatric: no anxiety, depression, agitation, delusions, proper insight.       VITAL SIGNS:   Vitals:    04/17/18 1239   BP: 108/71   Pulse: 77   Resp: 18   Temp: 97.4 °F (36.3 °C)   TempSrc: Oral   Weight: 100 kg (220 lb 9.6 oz)   Height: 185.4 cm (72.99\")          PHYSICAL EXAMINATION:    GENERAL:  Well-developed, well-nourished  Patient  in no acute distress.   SKIN:  Warm, dry without rashes, purpura or petechiae.  HEENT:  Pupils were equal and reactive to light and accomodation, conjunctivas non injected, no pterigion, normal extraocular movements, normal visual acuity.   Mouth mucosa was moist, no exudates in oropharynx, normal gum line, normal roof of the mouth and pillars, normal papillations of the tongue.Ear canals were normal, as well tympanic membranes, normal hearing acuity.No pain in mastoid area or erythema.  NECK:  Supple with good range of motion; no thyromegaly or masses, no JVD or bruits, no cervical adenopathies.No carotid arteries pain, no carotid abnormal pulsation or arterial dance.  LYMPHATICS:  No cervical, supraclavicular, axillary, epitrochlear or inguinal adenopathy.  CHEST:  Normal excursion of both jade thoraces, normal voice fremitus, no subcutaneous emphysema, normal axillas, no rashes or acanthosis nigricans. Lungs clear to percussion and auscultation, normal breath sounds bilaterally, no wheezing, crackles or ronchi, no stridor, no rubs.  CARDIAC AND VASCULAR: PMI not displaced,no thrills, normal rate and regular rhythm, without murmurs, rubs or S3 or S4 right or left sided gallops. Normal femoral, popliteal, pedis, brachial and carotid pulses.  ABDOMEN:  Soft, nontender with no " organomegaly or masses, no ascites, OBVIOUS collateral circulation,no distention,no Coy sign, no abdominal pain, no inguinal hernias,no umbilical hernias, no abdominal bruits. Normal bowel sounds.  GENITAL: Not  Performed.  EXTREMITIES  AND SPINE:  No clubbing, cyanosis or edema, no deformities or pain .No kyphosis, scoliosis, deformities or pain in spine, ribs or pelvic bone.  NEUROLOGICAL:  Patient was awake, alert, oriented to time, person and place, SENSORY NEUROPATHY IN FEET.        LABORATORY DATA:  Component      Latest Ref Rng & Units 12/19/2017 2/13/2018 3/19/2018          12:55 PM  1:35 PM  1:02 PM   CA 19-9      <=35.0 U/mL 155 (H) 229 (H) 257.1 (H)     CBC Auto Differential   Order: [id number] - Part of Panel Order [id number]   Status:  Final result   Visible to patient:  No (Not Released) Dx:  Anemia in neoplastic disease; Splenom...    Ref Range & Units 12:34   WBC 4.50 - 10.70 10*3/mm3 6.76    RBC 4.60 - 6.00 10*6/mm3 3.19     Hemoglobin 13.7 - 17.6 g/dL 11.5     Hematocrit 40.4 - 52.2 % 34.4     MCV 79.8 - 96.2 fL 107.8     MCH 27.0 - 32.7 pg 36.1     MCHC 32.6 - 36.4 g/dL 33.4    RDW 11.5 - 14.5 % 17.7     RDW-SD 37.0 - 54.0 fl 68.6     MPV 6.0 - 12.0 fL 13.0     Platelets 140 - 500 10*3/mm3 158    Neutrophil % 42.7 - 76.0 % 78.8     Lymphocyte % 19.6 - 45.3 % 9.3     Monocyte % 5.0 - 12.0 % 9.2    Eosinophil % 0.3 - 6.2 % 2.2    Basophil % 0.0 - 1.5 % 0.4    Immature Grans % 0.0 - 0.5 % 0.1    Neutrophils, Absolute 1.90 - 8.10 10*3/mm3 5.32    Lymphocytes, Absolute 0.90 - 4.80 10*3/mm3 0.63                 ASSESSMENT/PLAN:1. This patient has pancreatic cancer with liver and bone metastasis and at this time is undergoing therapy with Xeloda single agent and the dose has remained the same since the previous visit. The patient is due to start a new batch of the medicine today. So far this dose is not effecting or producing mucositis, diarrhea, palmar plantar erythema or significant hematological  toxicity. This medicine seems to be that is controlling the disease process in the best way possible.   2. The patient has ascites. The ascites during the paracentesis was a transudate with negative cytology, negative culture and is presently controlled with a minimal dose of aldactone that he takes every 2nd or 3rd day. His weight remains stable. He is controlling his sodium ingestion and I think overall he continues doing well from this point of view. They bought a new scale and this is keeping him at the proper level in regard to weight control.  3. The patient has had leukopenia and thrombocytopenia in part related to chemotherapy and splenomegaly. The splenomegaly is the result of cirrhosis of the liver associated with previous chemotherapy treatments. So far the blood counts remain stable and actually there are no issues in this regard at this point.   4. Pain control. The patient continues seeing Dr. Shaheed Bertrand for this. He will remain on the same care plan. He has an infusible pump in the abdominal wall in the right inferior right lower quadrant that remains fully functional. The patient continues doing an excellent job taking care of his pain.  5. Port flush. It has been flushed today with no difficulties, no evidence of alterations in regard to blood return.    1. The patient has been advised given the rise in the tumor marker and his minor increase in the degree of fatigue to go ahead and proceed with Oxaliplatin today. We are trying to balance the benefit of this medicine besides the side effects including leukopenia, thrombocytopenia and forcing peripheral neuropathy. He agrees with that.  2. The patient will remain on his diuretics as stated above and his wife will continue controlling weight and measuring sodium ingestion on a very judicious way.   3. He will see Dr. Shaheed Bertrand in regard his pain control medication in the next couple of weeks. He is having proper support of this at this point.  4. I  encouraged the patient to remain active and try to walk around the house.  5. I asked him that if the ascites gets any worse he will require a new paracentesis. We can remove a large volume even 6-10 liters if necessary. We know that this ascites is a transudate that has probably no obvious connection with the cancer of the pancreas in the 1st place.    Otherwise no other intervention from my point of view.

## 2018-05-15 PROBLEM — R18.8 OTHER ASCITES: Status: ACTIVE | Noted: 2018-01-01

## 2018-05-15 NOTE — PROGRESS NOTES
"Pt is referred to [facility] today by Dr. Manley, who will be attending. Orders were signed by Dr. Villarreal as \"doctor #[id number]\", and faxed along with medical records.   "

## 2018-05-15 NOTE — PROGRESS NOTES
REASONS FOR FOLLOWUP:    Metastatic pancreatic cancer to liver and sacrum.  The patient has undergone successful plan of radiation treatment to the sacrum on 2 different occasions and stereotactic treatment for a liver metastasis, solitary.  The patient also has undergone therapy with Eloxatin/Xeloda successfully.    HISTORY OF PRESENT ILLNESS:              PAST MEDICAL HISTORY:     1.  Morbid obesity.     2. History of hypertension.     3. Hyperlipidemia.     4. He has no history of diabetes, neither history of thyroid disease, coronary disease, and cardiac arrhythmia.    5.   He had a syncopal episode in December 2014.  Conclusion was that the patient had an episode of low blood pressure and these medicines were adjusted accordingly.     6. He has no history of colonoscopy in the past.     7. History of enlarged prostate with a normal PSA.     8. 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     9.   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  His diplopia improved spontaneously.     10. He has no history of previous surgeries.         HEMATOLOGIC/ONCOLOGIC HISTORY: History from previous dates can be found in the separate document.         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  We adjusted down the E  loxatin to 170 mg total, given his minor leukopenia and minor thrombocytopenia.          His pain medication will remain the same.  His doxepin will remain the same.  In regard to extravasation   of Eloxatin in the left upper extremity, local therapy will remain the same.      The patient is next seen in the office March 07, 2016 stable for all aspects.  We plan to continue chemotherapy at the same doses at this  time and scan him in the next several months and follow-up      ALLERGIES:  No known drug allergies.         MEDICATIONS:  The current medication list was reviewed with the patient and updated in the EMR this date per the medical assistant.  Medication dosages and frequencies were confirmed to be accurate.     SOCIAL   HISTORY: The patient used to work for [employer] for a long time, most of his life.  He retired many years ago.  He stays at home busy doing many activities, including fishing, mowing, gardening, and many other chores.  He was exposed to chemicals w  h  en he worked at [employer], but he used to have protective equipment. He is not aware of any other coworkers who have been exposed to anything, or diagnosed with cancer.  He has 3 stepchildren in good health.  He used to smoke for 20 years; he quit   30 years ago.  He used to smoke a pack of cigarettes a day.  He does not drink any alcohol.         FAMILY HISTORY:  His mother  as a consequence of primary liver cancer and she was diagnosed in .  He had a sister who had multiple myeloma and received care at [facility].  His wife and the children do not have any other significant illnesses.           REVIEW OF SYSTEMS:   General: no fever, chills,profound fatigue,dramatic weight changes, and lack of appetite.  Eyes: no epiphora, xerophthalmia,conjunctivitis, pain, glaucoma, blurred vision, blindness, secretion, photophobia, proptosis, diplopia.  Ears: no otorrhea, tinnitus, otorrhagia, deafness, pain, vertigo.  Nose: no rhinorrhea, epistaxis, alteration in perception of odors, sinuses pressure.  Mouth: no alteration in gums or teeth,  ulcers, no difficulty with mastication or deglut ion, no odynophagia.  Neck: no masses or pain, no thyroid alterations, no pain in muscles or arteries, no carotid odynia, no crepitation.  Respiratory: no cough, sputum production, dyspnea, trepopnea, pleuritic pain, hemoptysis.  Heart: no syncope,  "irregularity, palpitations, angina, orthopnea, paroxysmal nocturnal dyspnea.  Vascular Venous: no tenderness,edema, palpable cords, postphlebitic syndrome, skin changes or ulcerations.  Vascular Arterial: no distal ischemia, claudication, gangrene, neuropathic ischemic pain, skin ulcers, paleness or cyanosis.  GI: no dysphagia, odynophagia, no regurgitation, no heartburn,no indigestion,no nausea,no vomiting,no hematemesis ,no melena,no jaundice,dramatic distention, no obstipation,no enterorrhagia,no proctalgia,no anal  lesions, nochanges in bowel habits.  : no frequency, hesitancy, hematuria, discharge, pain.  Musculoskeletal: no muscle or tendon pain or inflammation, joint pain, edema,complete functional limitation,no fasciculations, mass.  Neurologic: no headache, seizures, alterations on Craneal nerves, worsening  motor and senssory deficit, poor coordination, no alteration in memory, orientation, calculation,writting, verbal or written language.  Skin: no rashes, pruritus or localized lesions.  Psychiatric: no anxiety, depression, agitation, delusions, proper insight.       VITAL SIGNS:   Vitals:    05/15/18 1150   BP: 107/75   Pulse: 101   Temp: 97.7 °F (36.5 °C)   TempSrc: Oral   SpO2: 93%   Weight: 80.8 kg (178 lb 3.2 oz)   Height: 185.4 cm (72.99\")          PHYSICAL EXAMINATION:    GENERAL:  Well-developed, poorly-nourished  Patient  in no acute distress. sarcopenia  SKIN:  Warm, dry without rashes, purpura or petechiae.  HEENT:  Pupils were equal and reactive to light and accomodation, conjunctivas non injected, no pterigion, normal extraocular movements, normal visual acuity.   Mouth mucosa was moist, no exudates in oropharynx, normal gum line, normal roof of the mouth and pillars, normal papillations of the tongue.Ear canals were normal, as well tympanic membranes, normal hearing acuity.No pain in mastoid area or erythema.  NECK:  Supple with good range of motion; no thyromegaly or masses, no JVD or " bruits, no cervical adenopathies.No carotid arteries pain, no carotid abnormal pulsation or arterial dance.  LYMPHATICS:  No cervical, supraclavicular, axillary, epitrochlear or inguinal adenopathy.  CHEST:  Normal excursion of both jade thoraces, normal voice fremitus, no subcutaneous emphysema, normal axillas, no rashes or acanthosis nigricans. Lungs clear to percussion and auscultation, normal breath sounds bilaterally, no wheezing, crackles or ronchi, no stridor, no rubs.  CARDIAC AND VASCULAR: normal rate and regular rhythm, without murmurs, rubs or S3 or S4 right or left sided gallops. Normal femoral, popliteal, pedis, brachial and carotid pulses.  ABDOMEN:  Soft, nontender with no organomegaly or masses, no ascites, OBVIOUS collateral circulation,no distention,no [place] sign, no abdominal pain, no inguinal hernias,no umbilical hernias, no abdominal bruits. Normal bowel sounds.  GENITAL: Not  Performed.  EXTREMITIES  AND SPINE:  No clubbing, cyanosis or edema, no deformities or pain .No kyphosis, scoliosis, deformities or pain in spine, ribs or pelvic bone.  NEUROLOGICAL:  Patient was awake, alert, oriented to time, person and place, SENSORY NEUROPATHY IN FEET.motor deficit with weakness in both legs.        LABORATORY DATA:  Component      Latest Ref Rng & Units 12/19/2017 2/13/2018 3/19/2018 4/17/2018          12:55 PM  1:35 PM  1:02 PM 12:34 PM   CA 19-9      <=35.0 U/mL 155 (H) 229 (H) 257.1 (H) 419.1 (H)     CBC Auto Differential   Order: [id number] - Part of Panel Order [id number]   Status:  Final result   Visible to patient:  No (Not Released) Dx:  Anemia in neoplastic disease; Acquire...    Ref Range & Units 11:31   WBC 4.50 - 10.70 10*3/mm3 8.64    RBC 4.60 - 6.00 10*6/mm3 3.58     Hemoglobin 13.7 - 17.6 g/dL 12.6     Hematocrit 40.4 - 52.2 % 36.9     MCV 79.8 - 96.2 fL 103.1     MCH 27.0 - 32.7 pg 35.2     MCHC 32.6 - 36.4 g/dL 34.1    RDW 11.5 - 14.5 % 17.8     RDW-SD 37.0 - 54.0 fl 65.8     MPV 6.0 -  12.0 fL 12.0    Platelets 140 - 500 10*3/mm3 237    Neutrophil % 42.7 - 76.0 % 81.1     Lymphocyte % 19.6 - 45.3 % 7.4     Monocyte % 5.0 - 12.0 % 7.8    Eosinophil % 0.3 - 6.2 % 2.5    Basophil % 0.0 - 1.5 % 0.7    Immature Grans % 0.0 - 0.5 % 0.5    Neutrophils, Absolute 1.90 - 8.10 10*3/mm3 7.01            Comprehensive Metabolic Panel   Order: [id number]   Status:  Final result   Visible to patient:  No (Not Released) Dx:  Anemia in neoplastic disease; Acquire...    Ref Range & Units 11:31   Glucose 65 - 99 mg/dL 126     BUN 8 - 23 mg/dL 15    Creatinine 0.76 - 1.27 mg/dL 0.75     Sodium 136 - 145 mmol/L 132     Potassium 3.5 - 5.2 mmol/L 3.7    Chloride 98 - 107 mmol/L 95     CO2 22.0 - 29.0 mmol/L 26.9    Calcium 8.6 - 10.5 mg/dL 8.4     Total Protein 6.0 - 8.5 g/dL 6.4    Albumin 3.50 - 5.20 g/dL 2.60     ALT (SGPT) 1 - 41 U/L 31    AST (SGOT) 1 - 40 U/L 93     Alkaline Phosphatase 39 - 117 U/L 99    Total Bilirubin 0.1 - 1.2 mg/dL 2.0     eGFR Non African Amer >60 mL/min/1.73 104    Globulin gm/dL 3.8              ULTRASOUND-GUIDED PARACENTESIS 05/14/2018      HISTORY: Ascites.     FINDINGS:  After signed informed consent was obtained, the left lower  quadrant was prepped and draped in the usual sterile fashion. Lidocaine  was used for local anesthesia.     Ultrasound guidance was used to place the paracentesis catheter into the  left lower quadrant peritoneal fluid collection. Then, 19.9L of ascites  was removed.     Confirmatory images were obtained.     Patient tolerated the procedure well with no complications.     This report was finalized on 5/15/2018 7:48 AM by Dr. Margarito Mcrae M.D.    ASSESSMENT/PLAN:1. This patient has had metastatic pancreatic cancer to the liver and to the spine now for 3-1/2 years and he has now failed the only plan of chemotherapy that ever helped him and the only one that he is able to tolerate. The patient has had obvious clinical progression of his disease with marked  degree of anorexia, cancer cachexia, fatigue and he has also progressive peripheral neuropathy, not only sensory but also motor after previous chemotherapy with oxaliplatin. The patient has required recently a paracentesis of probably 15L of fluid that looked like urine. This gave him a lot of relief of his symptoms. The patient is going to require at this time, given his progressive disease and the rise in the tumor marker, the following issues as below:   1. We will discontinue all the chemotherapy planned on him at this time including the Xeloda and the oxaliplatin. He is not a candidate for any other form of therapy at this time, becoming terminal.   2. The patient will be enrolled in Hospice if they are willing to pursue continuation of the care through his PCA pump and paracentesis periodically every couple of weeks. If these 2 requirements are not fulfilled by Hospice, the patient will not be a Hospice candidate or patient.   3. The patient will initiate a program of prednisone 20 mg a day to see if this makes any improvement in appetite. He will call and his wife will call us by Friday this week and see how things are going in this endeavor.   4. The patient will discontinue the cannabinoid oil that is not having any impact in regard to sense of wellbeing.   5. Eventually, the patient will require a hospital bed and we can provide this for him in case that he does not sign up with Hospice.   6. The patient will have return appointment in 1 month if they are able to come back. Otherwise, we will continue telephone conversation with the wife.   7. The patient is aware that he is becoming terminal. Life expectancy is measured in weeks and he will continue living 1 day at a time. This has been the case for the last 3-1/2 years.   8. The patient will have a repeat CBC in a month when he returns.   9. He will remain on his diuretics for ascites control.   10. We will schedule a standing schedule for paracentesis  every 2 weeks.     I discussed all these facts with the patient, his wife, and we spent 40 minutes in issues in regard to terminal care.   11. If the patient’s wife is not able to handle him at home, we will admit him to the palliative care unit at [facility] for terminal care.

## 2018-05-15 NOTE — PROGRESS NOTES
Staff message rec from Dr Manley-Pt will no longer need chemo-referred to Hospice. See below.    Fran Manley MD sent to [facility] Morton   Cc: Ama Silva RN; [facility] Clinical Pool             No more chemo refer him to [facility]      I have notified [phone]

## 2018-05-18 NOTE — TELEPHONE ENCOUNTER
Wife called to let Dr. Manley know that his appetite is better.  Also, they have decided not to go with hospice.  Message sent to Dr. Manley.

## 2018-05-30 PROBLEM — N30.00 ACUTE CYSTITIS WITHOUT HEMATURIA: Status: ACTIVE | Noted: 2018-01-01

## 2018-05-30 PROBLEM — C25.9 PANCREATIC CANCER (HCC): Status: ACTIVE | Noted: 2018-01-01

## 2018-05-30 PROBLEM — A41.9 SEPSIS (HCC): Status: ACTIVE | Noted: 2018-01-01

## 2018-05-30 PROBLEM — N39.0 UTI (URINARY TRACT INFECTION): Status: ACTIVE | Noted: 2018-01-01

## 2018-05-30 NOTE — ANESTHESIA PROCEDURE NOTES
Airway  Urgency: elective    Airway not difficult    General Information and Staff    Patient location during procedure: OR  Anesthesiologist: MAURICIO SARAH    Indications and Patient Condition  Indications for airway management: airway protection    Preoxygenated: yes  MILS maintained throughout  Mask difficulty assessment: 1 - vent by mask    Final Airway Details  Final airway type: endotracheal airway      Successful airway: ETT  Cuffed: yes   Successful intubation technique: direct laryngoscopy  Endotracheal tube insertion site: oral  Blade: Ty  Blade size: #3  ETT size: 7.5 mm  Cormack-Lehane Classification: grade I - full view of glottis  Placement verified by: chest auscultation and capnometry   Measured from: lips  ETT to lips (cm): 22  Number of attempts at approach: 1

## 2018-05-30 NOTE — ANESTHESIA PREPROCEDURE EVALUATION
Anesthesia Evaluation                  Airway   Mallampati: I  TM distance: >3 FB  Neck ROM: full  Dental - normal exam     Pulmonary    (+) a smoker Former,   (-) asthma, shortness of breath, recent URI  Cardiovascular     (+) hypertension, dysrhythmias (trigemeny), hyperlipidemia,   (-) past MI, angina, CHF      Neuro/Psych  (+) syncope,     (-) seizures, CVA, dizziness/light headedness  GI/Hepatic/Renal/Endo    (+) morbid obesity,  liver disease (massive ascites, metastatic pancreatic CA),     Musculoskeletal     (+) chronic pain (has implanted pain pump),   Abdominal    Substance History      OB/GYN          Other   (+) blood dyscrasia                     Anesthesia Plan    ASA 4     general     intravenous induction   Anesthetic plan and risks discussed with patient.

## 2018-05-30 NOTE — ANESTHESIA POSTPROCEDURE EVALUATION
"Patient: Bk Guerra    Procedure Summary     Date:  05/30/18 Room / Location:  [facility] OR 01 / [facility] MAIN OR    Anesthesia Start:  1630 Anesthesia Stop:  1743    Procedure:  CYSTOSCOPY RETROGRADE LT.AND RT.URETEROSCOPY LASER LITHOTRIPSY WITH BILATERAL STENT INSERTION (Bilateral ) Diagnosis:      Surgeon:  Arnel Downs MD Provider:  Latrice Montes MD    Anesthesia Type:  general ASA Status:  4          Anesthesia Type: general  Last vitals  BP   109/62 (05/30/18 1815)   Temp   36.6 °C (97.8 °F) (05/30/18 1737)   Pulse   97 (05/30/18 1830)   Resp   16 (05/30/18 1830)     SpO2   96 % (05/30/18 1815)     Post Anesthesia Care and Evaluation    Patient location during evaluation: PACU  Patient participation: complete - patient participated  Level of consciousness: awake and alert  Pain management: adequate  Airway patency: patent  Anesthetic complications: No anesthetic complications    Cardiovascular status: acceptable  Respiratory status: acceptable  Hydration status: acceptable    Comments: /62 (BP Location: Right arm, Patient Position: Lying)   Pulse 97   Temp 36.6 °C (97.8 °F) (Oral)   Resp 16   Ht 182.9 cm (72.01\")   Wt 91.9 kg (202 lb 9.6 oz)   SpO2 96%   BMI 27.47 kg/m²       "

## 2018-06-01 NOTE — PROGRESS NOTES
Name: Bk Guerra ADMIT: 2018   : 1950  PCP: Fran Manley MD    MRN: [id number] LOS: 2 days   AGE/SEX: 67 y.o. male    ROOM: [facility]   Subjective    Patient underwent bilateral ureteral stent insertion for renal stones  He also had a large volume paracentesis sees of 15 L today    Brief hospital course since admission:  Metastatic pancreatic cancer  Recurrent ascites with large volume paracenteses.  2 weeks ago he had a 19 L removed and today (2018) he had 15 L removed.  He normally gets paracentesis sees twice a month.  Dr. Manley is thinking of changing paracentesis sees to weekly.  He is also talking about hospice and palliative care.    I have reviewed past medical history, social hisotory, family history, allergies.  No changes from admission note.      Review of Systems   Constitutional: Positive for fatigue.   Respiratory: Positive for shortness of breath. Negative for wheezing.    Cardiovascular: Positive for leg swelling. Negative for chest pain and palpitations.   Gastrointestinal: Positive for abdominal distention.          Objective   Vital Signs  Temp:  [98.2 °F (36.8 °C)-98.4 °F (36.9 °C)] 98.4 °F (36.9 °C)  Heart Rate:  [] 82  Resp:  [16] 16  BP: ()/(56-70) 97/62  SpO2:  [93 %-96 %] 95 %  on  Flow (L/min):  [1-3] 3;   Device (Oxygen Therapy): nasal cannula  Body mass index is 24.71 kg/m².    Intake/Output Summary (Last 24 hours) at 18 0949  Last data filed at 18 0448   Gross per 24 hour   Intake              300 ml   Output            66677 ml   Net           -99162 ml       Physical Exam   Constitutional: He is oriented to person, place, and time. He appears well-developed and well-nourished. He appears ill (chronically).   HENT:   Head: Atraumatic.   Eyes: Pupils are equal, round, and reactive to light. No scleral icterus.   Cardiovascular: Normal rate and regular rhythm.    Pulmonary/Chest: No accessory muscle usage. No respiratory distress. He has  decreased breath sounds. He has no wheezes.   Abdominal: Soft. Normal appearance and bowel sounds are normal. He exhibits no ascites. There is no hepatosplenomegaly.   Neurological: He is alert and oriented to person, place, and time.   Skin: No laceration and no petechiae noted. No cyanosis. Nails show no clubbing.   Psychiatric: He has a normal mood and affect. His behavior is normal.   Vitals reviewed.      Results Review:      Results from last 7 days  Lab Units 06/01/18  0353 05/31/18  0407 05/30/18 2120   WBC 10*3/mm3 8.84 8.88 8.55   HEMOGLOBIN g/dL 11.4* 11.3* 11.1*   HEMATOCRIT % 34.0* 34.1* 33.4*   PLATELETS 10*3/mm3 148 163 150       Results from last 7 days  Lab Units 06/01/18  0353 05/31/18  0407 05/30/18  2120   SODIUM mmol/L 132* 135* 134*   POTASSIUM mmol/L 4.6 4.5 4.3   CHLORIDE mmol/L 94* 95* 95*   CO2 mmol/L 29.6* 27.9 27.1   BUN mg/dL 30* 30* 29*   CREATININE mg/dL 1.19 1.35* 1.24   GLUCOSE mg/dL 111* 123* 123*   CALCIUM mg/dL 8.8 8.7 8.4*       Results from last 7 days  Lab Units 05/31/18  0407   INR  1.19*     Hemoglobin A1C:  Lab Results   Component Value Date    HGBA1C 6.2 (H) 03/27/2015     Glucose Range:No results found for: POCGLU      ceftriaxone 1 g Intravenous Q24H   famotidine 20 mg Oral QAM   multivitamin 1 tablet Oral Daily   polyethylene glycol 17 g Oral Daily   predniSONE 20 mg Oral Daily   sulfamethoxazole-trimethoprim 1 tablet Oral Q12H   tamsulosin 0.4 mg Oral Nightly       lactated ringers 9 mL/hr Last Rate: 9 mL/hr (05/30/18 1515)   Diet Regular  Assessment/Plan       Assessment/Plan      Active Hospital Problems (** Indicates Principal Problem)    Diagnosis Date Noted   • **Acute cystitis without hematuria [N30.00] 05/30/2018   • Sepsis [A41.9] 05/30/2018   • UTI (urinary tract infection) [N39.0] 05/30/2018   • Pancreatic cancer [C25.9] 05/30/2018   • Ascites [R18.8] 05/15/2018   • Hydronephrosis with urinary obstruction due to renal calculus [N13.2] 10/31/2017   • Liver  metastases [C78.7] 04/11/2016      Resolved Hospital Problems    Diagnosis Date Noted Date Resolved   No resolved problems to display.     · Continue antibiotics for urinary tract infection  · Hydronephrosis secondary to kidney stones.  Patient had stents placed and urology is following. Discussed with Dr. Downs and he says that the stents can stay up to 3 months.  · Metastatic pancreatic cancer with recurrent ascites and a large volume paracenteses every 2 weeks.  Will consult oncology to follow him and also talk to the family about hospice and palliative care.  The wife and the patient tells me that they have discussed with Dr. Manley in the past and wanted to have a plan.  They would like to try out paracenteses on a weekly basis and see how he tolerates it  · If all agree, most likely home and Saturday with weekly paracentesis .          Margret Yun MD  [facility]  06/01/18
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
## 2018-06-02 NOTE — PLAN OF CARE
Problem: Patient Care Overview  Goal: Plan of Care Review  Outcome: Ongoing (interventions implemented as appropriate)   06/02/18 1503   Coping/Psychosocial   Plan of Care Reviewed With patient   Plan of Care Review   Progress no change   OTHER   Outcome Summary Catheter noted leaking from insertion site. Advanced catheter, filled baloon with more sterile wanter and irrigated. No noted issues with catheter but continued to leak. Replaced with 18fr. 30cc coude catheter.      Goal: Individualization and Mutuality  Outcome: Ongoing (interventions implemented as appropriate)    Goal: Discharge Needs Assessment  Outcome: Ongoing (interventions implemented as appropriate)    Goal: Interprofessional Rounds/Family Conf  Outcome: Ongoing (interventions implemented as appropriate)      Problem: Fall Risk (Adult)  Goal: Absence of Fall  Outcome: Ongoing (interventions implemented as appropriate)      Problem: Skin Injury Risk (Adult)  Goal: Skin Health and Integrity  Outcome: Ongoing (interventions implemented as appropriate)      Problem: Paracentesis, Abdominal (Adult)  Goal: Signs and Symptoms of Listed Potential Problems Will be Absent, Minimized or Managed (Paracentesis, Abdominal)  Outcome: Ongoing (interventions implemented as appropriate)      Problem: Urinary Tract Infection (Adult)  Goal: Signs and Symptoms of Listed Potential Problems Will be Absent, Minimized or Managed (Urinary Tract Infection)  Outcome: Ongoing (interventions implemented as appropriate)

## 2018-06-02 NOTE — PLAN OF CARE
Problem: Patient Care Overview  Goal: Plan of Care Review  Outcome: Ongoing (interventions implemented as appropriate)   06/02/18 4424   Coping/Psychosocial   Plan of Care Reviewed With patient   Plan of Care Review   Progress no change   OTHER   Outcome Summary patient doing well overnight; f/c in place with red urine but no clots noted with irrigation; pain controlled; wife at bedside; may possibly go home today with hospice as long as paracentesis done weekly; will monitor      Goal: Individualization and Mutuality  Outcome: Ongoing (interventions implemented as appropriate)    Goal: Discharge Needs Assessment  Outcome: Ongoing (interventions implemented as appropriate)      Problem: Fall Risk (Adult)  Goal: Absence of Fall  Outcome: Ongoing (interventions implemented as appropriate)      Problem: Skin Injury Risk (Adult)  Goal: Skin Health and Integrity  Outcome: Ongoing (interventions implemented as appropriate)      Problem: Paracentesis, Abdominal (Adult)  Goal: Signs and Symptoms of Listed Potential Problems Will be Absent, Minimized or Managed (Paracentesis, Abdominal)  Outcome: Ongoing (interventions implemented as appropriate)      Problem: Nutrition, Imbalanced: Inadequate Oral Intake (Adult)  Goal: Improved Oral Intake  Outcome: Ongoing (interventions implemented as appropriate)    Goal: Prevent Further Weight Loss  Outcome: Ongoing (interventions implemented as appropriate)      Problem: Urinary Tract Infection (Adult)  Goal: Signs and Symptoms of Listed Potential Problems Will be Absent, Minimized or Managed (Urinary Tract Infection)  Outcome: Ongoing (interventions implemented as appropriate)

## 2018-06-02 NOTE — PROGRESS NOTES
Subjective   REASON FOR CONSULTATION:   1.  Metastatic pancreatic cancer with malignant ascites.  2.  Obstructive uropathy.  Patient required cystoscopy and bilateral stents.    HISTORY OF PRESENT ILLNESS:   The patient is a 67 y.o. year old male who is here for an opinion about the above issue.  He has been treated by Dr. English at [facility] office for several years with metastatic pancreatic cancer.  On his last visit decision was made to discontinue chemotherapy and focus on comfort and quality of life.  His major symptom is rapidly recurring malignant ascites which has required frequent drainage by ultrasound-guided paracentesis.     He was admitted on 5/30/2018 due to obstructive uropathy.  Dr. Dwons performed cystoscopy and bilateral stents on this admission.  He currently has a Petersen catheter in place with bloody urine in the collection bag.     He also underwent his paracentesis procedure 5/31/2018 with removal of about 16 L of ascites fluid.  History of Present Illness      Past Medical History, Past Surgical History, Social History, Family History have been reviewed and are without significant changes except as mentioned.    Review of Systems   Constitutional: Positive for activity change, appetite change, fatigue and unexpected weight change.   Cardiovascular: Positive for leg swelling.   Gastrointestinal: Positive for abdominal distention and abdominal pain.   Genitourinary: Positive for difficulty urinating and flank pain.   Musculoskeletal: Positive for back pain.   Skin: Positive for color change.   Hematological: Bruises/bleeds easily.     A comprehensive 14 point review of systems was performed and was negative except as mentioned.    Medications:  The current medication list was reviewed in the EMR    ALLERGIES:  No Known Allergies    Objective      Vitals:    06/1950 06/02/18 0053 06/02/18 0833 06/02/18 1015   BP: 100/55 93/58 (!) 87/60 97/56   BP Location: Right arm Right arm Right arm Right  arm   Patient Position: Lying Lying Lying Lying   Pulse: 79 90 109 110   Resp: 16 16 16    Temp: 98.2 °F (36.8 °C) 98.1 °F (36.7 °C) 98.4 °F (36.9 °C)    TempSrc: Oral Oral Oral    SpO2: 93% 92% 92%    Weight:       Height:         Current Status 5/15/2018   ECOG score 1       Physical Exam    Constitutional: He is oriented to person, place, and time. No distress.   Chronically ill-appearing cachectic gentleman in no acute distress   HENT:   Head: Normocephalic.   Eyes: Conjunctivae and EOM are normal. Pupils are equal, round, and reactive to light. No scleral icterus.   Neck: Normal range of motion. Neck supple. No JVD present. No thyromegaly present.   Cardiovascular: Normal rate and regular rhythm.  Exam reveals no gallop and no friction rub.    No murmur heard.  Pulmonary/Chest: Effort normal and breath sounds normal. He has no wheezes. He has no rales.   Abdominal: Soft. He exhibits distension. He exhibits no mass. There is no tenderness.   Ascites fluid.  Indwelling pain pump on the right lower abdominal wall   Genitourinary:   Genitourinary Comments: Petersen catheter in place with bloody urine in the collection bag   Musculoskeletal: Normal range of motion. He exhibits edema. He exhibits no deformity.   Muscle wasting.   Lymphadenopathy:     He has no cervical adenopathy.   Neurological: He is alert and oriented to person, place, and time. He has normal reflexes. No cranial nerve deficit.   Skin: Skin is warm and dry. No rash noted. No erythema.   Psychiatric: He has a normal mood and affect. His behavior is normal. Judgment normal.     RECENT LABS:  Hematology WBC   Date Value Ref Range Status   06/02/2018 9.96 4.50 - 10.70 10*3/mm3 Final     RBC   Date Value Ref Range Status   06/02/2018 3.40 (L) 4.60 - 6.00 10*6/mm3 Final     Hemoglobin   Date Value Ref Range Status   06/02/2018 11.9 (L) 13.7 - 17.6 g/dL Final     Hematocrit   Date Value Ref Range Status   06/02/2018 35.4 (L) 40.4 - 52.2 % Final      Platelets   Date Value Ref Range Status   06/02/2018 174 140 - 500 10*3/mm3 Final            ULTRASOUND-GUIDED PARACENTESIS 05/31/2018      HISTORY: Ascites.     FINDINGS:  After signed informed consent was obtained, the left lower  quadrant was prepped and draped in the usual sterile fashion. Lidocaine  was used for local anesthesia.     Ultrasound guidance was used to place the paracentesis catheter into the  left lower quadrant peritoneal fluid collection. Then, 15,850 mL of  ascites was removed.     Confirmatory images were obtained.     Patient tolerated the procedure well with no complications.    Assessment/Plan   1.  Terminal metastatic pancreatic cancer with rapidly reaccumulating malignant ascites requiring frequent paracentesis procedures.  2.  Renal stones with obstruction.  Patient underwent cystoscopy with stone retrieval and bilateral stent placement on 5/30/2018 by Dr. Downs.  He still has a Petersen catheter in place but hopes to go home without the catheter of possible.     Plan  1.  We reviewed the situation again with the patient and his wife and reviewed Dr. Manley's recent office note where hospice was recommended.  The patient and wife have decided not to meet with hospice at home but when we brought this up again they are willing to talk with a hospice coordinator on this admission to see if this would be a reasonable approach.  His biggest concern is the rapidly reaccumulating ascites fluid.  I spoke with Dr. Mcrae in the interventional radiology Department and he could place a pigtail drain at the time of his next drainage procedure.  If hospice is following the patient at home, the hospice nurse could  likely drain the pigtail catheter once a week to maintain his comfort without having to make frequent visits back to the hospital. Awaiting visit from [facility] nurse.  2.  If hospice does not feel that this would be a reasonable approach, then the patient will continue to return for  outpatient paracenteses but will need to be scheduled on a weekly basis rather than every 2 weeks.  3.  The patient and wife wish to maximize his time and comfort at home but they did mention to me that when he becomes imminently terminal and is actively dying they would prefer to be admitted to the [facility] palliative care unit for terminal care rather than dying at home.  4.  The patient currently has a follow-up appointment scheduled with Dr. Manley at [facility] office 8:40 AM on 6/12/2018.  They know they can call sooner if he has questions or problems prior to that visit.    Discussed situation with RN              6/2/2018      CC:

## 2018-06-02 NOTE — PROGRESS NOTES
Name: Bk Guerra ADMIT: 2018   : 1950  PCP: Fran Manley MD    MRN: [id number] LOS: 3 days   AGE/SEX: 67 y.o. male    ROOM: [facility]   Subjective    Patient underwent bilateral ureteral stent insertion for renal stones  He also had a large volume paracentesis sees of 15 L on   Still has blood tinged urine  Low blood pressure    Brief hospital course since admission:  Metastatic pancreatic cancer  Recurrent ascites with large volume paracenteses.  2 weeks ago he had a 19 L removed and today (2018) he had 15 L removed.  He normally gets paracentesis sees twice a month.  Dr. Manley is thinking of changing paracentesis sees to weekly.  He is also talking about hospice and palliative care.    I have reviewed past medical history, social hisotory, family history, allergies.  No changes from admission note.      Review of Systems   Constitutional: Positive for fatigue.   Respiratory: Positive for shortness of breath. Negative for wheezing.    Cardiovascular: Positive for leg swelling. Negative for chest pain and palpitations.   Gastrointestinal: Positive for abdominal distention.          Objective   Vital Signs  Temp:  [98.1 °F (36.7 °C)-98.2 °F (36.8 °C)] 98.1 °F (36.7 °C)  Heart Rate:  [] 109  Resp:  [16] 16  BP: ()/(55-60) 87/60  SpO2:  [92 %-93 %] 92 %  on  Flow (L/min):  [3] 3;   Device (Oxygen Therapy): nasal cannula  Body mass index is 24.71 kg/m².    Intake/Output Summary (Last 24 hours) at 18 0944  Last data filed at 18 0521   Gross per 24 hour   Intake              410 ml   Output             1210 ml   Net             -800 ml       Physical Exam   Constitutional: He is oriented to person, place, and time. He appears well-developed and well-nourished. He appears ill (chronically).   HENT:   Head: Atraumatic.   Eyes: Pupils are equal, round, and reactive to light. No scleral icterus.   Cardiovascular: Normal rate and regular rhythm.    Pulmonary/Chest: No  accessory muscle usage. No respiratory distress. He has decreased breath sounds. He has no wheezes.   Abdominal: Soft. Normal appearance and bowel sounds are normal. He exhibits no ascites. There is no hepatosplenomegaly.   Neurological: He is alert and oriented to person, place, and time.   Skin: No laceration and no petechiae noted. No cyanosis. Nails show no clubbing.   Psychiatric: He has a normal mood and affect. His behavior is normal.   Vitals reviewed.      Results Review:      Results from last 7 days  Lab Units 06/02/18 0417 06/01/18 0353 05/31/18  0407   WBC 10*3/mm3 9.96 8.84 8.88   HEMOGLOBIN g/dL 11.9* 11.4* 11.3*   HEMATOCRIT % 35.4* 34.0* 34.1*   PLATELETS 10*3/mm3 174 148 163       Results from last 7 days  Lab Units 06/02/18 0417 06/01/18  0353 05/31/18  0407   SODIUM mmol/L 130* 132* 135*   POTASSIUM mmol/L 4.8 4.6 4.5   CHLORIDE mmol/L 92* 94* 95*   CO2 mmol/L 27.4 29.6* 27.9   BUN mg/dL 27* 30* 30*   CREATININE mg/dL 1.11 1.19 1.35*   GLUCOSE mg/dL 98 111* 123*   CALCIUM mg/dL 8.6 8.8 8.7       Results from last 7 days  Lab Units 05/31/18 0407   INR  1.19*     Hemoglobin A1C:  Lab Results   Component Value Date    HGBA1C 6.2 (H) 03/27/2015     Glucose Range:No results found for: POCGLU      ceftriaxone 1 g Intravenous Q24H   famotidine 20 mg Oral QAM   multivitamin 1 tablet Oral Daily   polyethylene glycol 17 g Oral Daily   predniSONE 20 mg Oral Daily   sulfamethoxazole-trimethoprim 1 tablet Oral Q12H   tamsulosin 0.4 mg Oral Nightly       lactated ringers 9 mL/hr Last Rate: 9 mL/hr (05/30/18 1515)   Diet Regular  Assessment/Plan       Assessment/Plan      Active Hospital Problems (** Indicates Principal Problem)    Diagnosis Date Noted   • **Acute cystitis without hematuria [N30.00] 05/30/2018   • Sepsis [A41.9] 05/30/2018   • UTI (urinary tract infection) [N39.0] 05/30/2018   • Pancreatic cancer [C25.9] 05/30/2018   • Ascites [R18.8] 05/15/2018   • Hydronephrosis with urinary obstruction  due to renal calculus [N13.2] 10/31/2017   • Liver metastases [C78.7] 04/11/2016      Resolved Hospital Problems    Diagnosis Date Noted Date Resolved   No resolved problems to display.     · Hydronephrosis secondary to kidney stones.  Patient had stents placed and urology is following. Discussed with Dr. Downs and he says that the stents can stay up to 3 months.  · Metastatic pancreatic cancer with recurrent ascites and a large volume paracenteses every 2 weeks.  Will consult oncology to follow him and also talk to the family about hospice and palliative care.  The wife and the patient tells me that they have discussed with Dr. Manley in the past and wanted to have a plan.  They would like to try out paracenteses on a weekly basis and see how he tolerates it  · Greatly appreciate Dr. Gale and Dr. Downs's help in this complicated patient.  I'm going to leave the patient today in the hospital and possibly discharge in Sunday.  The plan is to remove the right ureteral stent next week as an outpatient and place a pigtail catheter during next paracentesis  and also coordinate home health care or hospice care          Margret Yun MD  [facility]  06/02/18
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
## 2018-06-03 NOTE — PROGRESS NOTES
Name: Bk Guerra ADMIT: 2018   : 1950  PCP: Fran Manley MD    MRN: [id number] LOS: 4 days   AGE/SEX: 67 y.o. male    ROOM: [facility]   Subjective    Patient underwent bilateral ureteral stent insertion for renal stones  He also had a large volume paracentesis sees of 15 L on   Still has blood tinged urine  Low blood pressure    Brief hospital course since admission:  Metastatic pancreatic cancer  Recurrent ascites with large volume paracenteses.  2 weeks ago he had a 19 L removed and today (2018) he had 15 L removed.  He normally gets paracentesis sees twice a month.  Dr. Manley is thinking of changing paracentesis sees to weekly.  He is also talking about hospice and palliative care.    I have reviewed past medical history, social hisotory, family history, allergies.  No changes from admission note.      Review of Systems   Constitutional: Positive for fatigue.   Respiratory: Positive for shortness of breath. Negative for wheezing.    Cardiovascular: Positive for leg swelling. Negative for chest pain and palpitations.   Gastrointestinal: Positive for abdominal distention.          Objective   Vital Signs  Temp:  [97.1 °F (36.2 °C)-98 °F (36.7 °C)] 97.1 °F (36.2 °C)  Heart Rate:  [] 86  Resp:  [16] 16  BP: ()/(51-58) 98/56  SpO2:  [90 %-92 %] 91 %  on  Flow (L/min):  [3] 3;   Device (Oxygen Therapy): nasal cannula  Body mass index is 24.71 kg/m².    Intake/Output Summary (Last 24 hours) at 18 1106  Last data filed at 18 0557   Gross per 24 hour   Intake              110 ml   Output              900 ml   Net             -790 ml       Physical Exam   Constitutional: He is oriented to person, place, and time. He appears well-developed and well-nourished. He appears ill (chronically).   HENT:   Head: Atraumatic.   Eyes: Pupils are equal, round, and reactive to light. No scleral icterus.   Cardiovascular: Normal rate and regular rhythm.    Pulmonary/Chest: No  accessory muscle usage. No respiratory distress. He has decreased breath sounds. He has no wheezes.   Abdominal: Soft. Normal appearance and bowel sounds are normal. He exhibits no ascites. There is no hepatosplenomegaly.   Neurological: He is alert and oriented to person, place, and time.   Skin: No laceration and no petechiae noted. No cyanosis. Nails show no clubbing.   Psychiatric: He has a normal mood and affect. His behavior is normal.   Vitals reviewed.      Results Review:      Results from last 7 days  Lab Units 06/03/18  0357 06/02/18 0417 06/01/18  0353   WBC 10*3/mm3 9.91 9.96 8.84   HEMOGLOBIN g/dL 12.0* 11.9* 11.4*   HEMATOCRIT % 34.3* 35.4* 34.0*   PLATELETS 10*3/mm3 165 174 148       Results from last 7 days  Lab Units 06/03/18  0357 06/02/18 0417 06/01/18  0353   SODIUM mmol/L 130* 130* 132*   POTASSIUM mmol/L 5.0 4.8 4.6   CHLORIDE mmol/L 92* 92* 94*   CO2 mmol/L 26.9 27.4 29.6*   BUN mg/dL 25* 27* 30*   CREATININE mg/dL 1.06 1.11 1.19   GLUCOSE mg/dL 99 98 111*   CALCIUM mg/dL 8.7 8.6 8.8       Results from last 7 days  Lab Units 05/31/18  0407   INR  1.19*     Hemoglobin A1C:  Lab Results   Component Value Date    HGBA1C 6.2 (H) 03/27/2015     Glucose Range:No results found for: POCGLU      ceftriaxone 1 g Intravenous Q24H   doxepin 25 mg Oral Nightly   famotidine 20 mg Oral QAM   gabapentin 200 mg Oral Q12H   multivitamin 1 tablet Oral Daily   polyethylene glycol 17 g Oral Daily   predniSONE 20 mg Oral Daily   sulfamethoxazole-trimethoprim 1 tablet Oral Q12H   tamsulosin 0.4 mg Oral Nightly       lactated ringers 9 mL/hr Last Rate: 9 mL/hr (05/30/18 1515)   Diet Regular  Assessment/Plan       Assessment/Plan      Active Hospital Problems (** Indicates Principal Problem)    Diagnosis Date Noted   • **Acute cystitis without hematuria [N30.00] 05/30/2018   • Sepsis [A41.9] 05/30/2018   • UTI (urinary tract infection) [N39.0] 05/30/2018   • Pancreatic cancer [C25.9] 05/30/2018   • Ascites [R18.8]  05/15/2018   • Hydronephrosis with urinary obstruction due to renal calculus [N13.2] 10/31/2017   • Liver metastases [C78.7] 04/11/2016      Resolved Hospital Problems    Diagnosis Date Noted Date Resolved   No resolved problems to display.     · Hydronephrosis secondary to kidney stones.  Patient had stents placed and urology is following. Discussed with Dr. Downs and he says that the stents can stay up to 3 months.  · Metastatic pancreatic cancer with recurrent ascites and a large volume paracenteses every 2 weeks.  Will consult oncology to follow him and also talk to the family about hospice and palliative care.  The wife and the patient tells me that they have discussed with Dr. Manley in the past and wanted to have a plan.  They would like to try out paracenteses on a weekly basis and see how he tolerates it  · Greatly appreciate Dr. Gale and Dr. Downs's help in this complicated patient.  I'm going to leave the patient today in the hospital and possibly discharge in Sunday.  The plan is to remove the right ureteral stent next week as an outpatient and place a pigtail catheter during next paracentesis  and also coordinate home health care or hospice care  · Patient complains of no pain I have started him on gabapentin.  · In the pigtail placed in the morning in preparation for discharge    Addendum  The nurse called me and informed me that the wife is the rewarding and she wants the patient's CODE STATUS changed to comfort measures only.  In addition the plan is to proceed with a pigtail catheter and transitioned to palliative/hospice and home      Margret Yun MD  [facility]  06/03/18

## 2018-06-03 NOTE — PLAN OF CARE
Problem: Patient Care Overview  Goal: Plan of Care Review  Outcome: Ongoing (interventions implemented as appropriate)   06/03/18 0320   Coping/Psychosocial   Plan of Care Reviewed With patient;spouse   Plan of Care Review   Progress no change   OTHER   Outcome Summary patient without leaking overnight of catheter; urine tea colored; pain meds q6; prob home with hospice today; will monitor     Goal: Individualization and Mutuality  Outcome: Ongoing (interventions implemented as appropriate)    Goal: Discharge Needs Assessment  Outcome: Ongoing (interventions implemented as appropriate)      Problem: Fall Risk (Adult)  Goal: Absence of Fall  Outcome: Ongoing (interventions implemented as appropriate)      Problem: Skin Injury Risk (Adult)  Goal: Skin Health and Integrity  Outcome: Ongoing (interventions implemented as appropriate)      Problem: Paracentesis, Abdominal (Adult)  Goal: Signs and Symptoms of Listed Potential Problems Will be Absent, Minimized or Managed (Paracentesis, Abdominal)  Outcome: Ongoing (interventions implemented as appropriate)      Problem: Nutrition, Imbalanced: Inadequate Oral Intake (Adult)  Goal: Improved Oral Intake  Outcome: Ongoing (interventions implemented as appropriate)    Goal: Prevent Further Weight Loss  Outcome: Ongoing (interventions implemented as appropriate)      Problem: Urinary Tract Infection (Adult)  Goal: Signs and Symptoms of Listed Potential Problems Will be Absent, Minimized or Managed (Urinary Tract Infection)  Outcome: Ongoing (interventions implemented as appropriate)

## 2018-06-03 NOTE — PROGRESS NOTES
[facility] Admission Note             Bk Guerra  [id number]  6/3/2018    We have reviewed the [facility] medical records & patient would be hospice eligible, and once pigtail drain is placed for malignant ascites our nurses could help patient & family manage draining at home setting & until then our guidelines would allow weekly paracentesis in outpatient setting.     Our social service staff will attempt to meet with wife & patient today at hospital around 1230 or 1 to discuss our services & patient goals of care.  We have left 2 msgs on wife's cell to confirm & no answer & when we called into patient room there was no answer. If [facility] staff will please let patient & wife know our  plans to be at hospital to talk with them around 1230 today.    Once they are home & agreeable to pursuing [facility] home admission we will schedule nurse to complete admission.     Our understanding is wife/patient have been hesitant to move forward with hospice previously, but we will continue to make contact & offer support/guidance as patient & family would benefit from [facility] admission when they are agreeable.     Thank you for referral.           Elzbieta Knight RN

## 2018-06-03 NOTE — NURSING NOTE
Patient and spouse now [facility]  [facility] not [facility] further.  Still requesting to have drain placed in abdomen for paracentesis and comfort.  During discussion patient and spouse requested to have code status changed to comfort measures only.  Change in plan called to Dr. Yun who will enter appropriate orders.

## 2018-06-03 NOTE — PROGRESS NOTES
Subjective   REASON FOR CONSULTATION:   1.  Metastatic pancreatic cancer with malignant ascites.  2.  Obstructive uropathy.  Patient required cystoscopy and bilateral stents.    HISTORY OF PRESENT ILLNESS:   The patient is a 67 y.o. year old male who is here for an opinion about the above issue.  He has been treated by Dr. Manley at [facility] office for several years with metastatic pancreatic cancer.  On his last visit decision was made to discontinue chemotherapy and focus on comfort and quality of life.  His major symptom is rapidly recurring malignant ascites which has required frequent drainage by ultrasound-guided paracentesis.     He was admitted on 5/30/2018 due to obstructive uropathy.  Dr. Downs performed cystoscopy and bilateral stents on this admission.  He currently has a Alejandro catheter in place with bloody urine in the collection bag.     He also underwent his paracentesis procedure 5/31/2018 with removal of about 16 L of ascites fluid.    More pain today. Awaiting meeting with Hospice. Still has alejandro cath.  History of Present Illness      Past Medical History, Past Surgical History, Social History, Family History have been reviewed and are without significant changes except as mentioned.    Review of Systems   Gastrointestinal: Positive for abdominal pain.   Genitourinary: Positive for flank pain.      Constitutional: Positive for activity change, appetite change, fatigue and unexpected weight change.   Cardiovascular: Positive for leg swelling.   Musculoskeletal: Positive for back pain.   Skin: Positive for color change.   Hematological: Bruises/bleeds easily.     A comprehensive 14 point review of systems was performed and was negative except as mentioned.    Medications:  The current medication list was reviewed in the EMR    ALLERGIES:  No Known Allergies    Objective      Vitals:    06/02/18 1452 06/02/18 1935 06/02/18 2336 06/03/18 0739   BP: 102/58 98/57 94/51 98/56   BP Location: Right arm Right  arm Right arm Right arm   Patient Position: Lying Lying Lying Lying   Pulse: 100 77 82 86   Resp: 16 16 16 16   Temp: 98 °F (36.7 °C) 97.8 °F (36.6 °C) 98 °F (36.7 °C) 97.1 °F (36.2 °C)   TempSrc: Oral Oral Oral Oral   SpO2: 91% 90% 92% 91%   Weight:       Height:         Current Status 5/15/2018   ECOG score 1       Physical Exam    Constitutional: He is oriented to person, place, and time. No distress.   Chronically ill-appearing cachectic gentleman in no acute distress   HENT:   Head: Normocephalic.   Eyes: Conjunctivae and EOM are normal. Pupils are equal, round, and reactive to light. No scleral icterus.   Neck: Normal range of motion. Neck supple. No JVD present. No thyromegaly present.   Cardiovascular: Normal rate and regular rhythm.  Exam reveals no gallop and no friction rub.    No murmur heard.  Pulmonary/Chest: Effort normal and breath sounds normal. He has no wheezes. He has no rales.   Abdominal: Soft. He exhibits distension. He exhibits no mass. There is no tenderness.   Ascites fluid.  Indwelling pain pump on the right lower abdominal wall   Genitourinary:   Genitourinary Comments: Petersen catheter in place with bloody urine in the collection bag   Musculoskeletal: Normal range of motion. He exhibits edema. He exhibits no deformity.   Muscle wasting.   Lymphadenopathy:     He has no cervical adenopathy.   Neurological: He is alert and oriented to person, place, and time. He has normal reflexes. No cranial nerve deficit.   Skin: Skin is warm and dry. No rash noted. No erythema.   Psychiatric: He has a normal mood and affect. His behavior is normal. Judgment normal.     RECENT LABS:  Hematology WBC   Date Value Ref Range Status   06/03/2018 9.91 4.50 - 10.70 10*3/mm3 Final     RBC   Date Value Ref Range Status   06/03/2018 3.31 (L) 4.60 - 6.00 10*6/mm3 Final     Hemoglobin   Date Value Ref Range Status   06/03/2018 12.0 (L) 13.7 - 17.6 g/dL Final     Hematocrit   Date Value Ref Range Status   06/03/2018  34.3 (L) 40.4 - 52.2 % Final     Platelets   Date Value Ref Range Status   06/03/2018 165 140 - 500 10*3/mm3 Final            Lab Results   Component Value Date    GLUCOSE 99 06/03/2018    CALCIUM 8.7 06/03/2018     (L) 06/03/2018    K 5.0 06/03/2018    CO2 26.9 06/03/2018    CL 92 (L) 06/03/2018    BUN 25 (H) 06/03/2018    CREATININE 1.06 06/03/2018    EGFRIFNONA 70 06/03/2018    BCR 23.6 06/03/2018    ANIONGAP 11.1 06/03/2018       ULTRASOUND-GUIDED PARACENTESIS 05/31/2018      HISTORY: Ascites.     FINDINGS:  After signed informed consent was obtained, the left lower  quadrant was prepped and draped in the usual sterile fashion. Lidocaine  was used for local anesthesia.     Ultrasound guidance was used to place the paracentesis catheter into the  left lower quadrant peritoneal fluid collection. Then, 15,850 mL of  ascites was removed.     Confirmatory images were obtained.     Patient tolerated the procedure well with no complications.    Assessment/Plan   1.  Terminal metastatic pancreatic cancer with rapidly reaccumulating malignant ascites requiring frequent paracentesis procedures. Plan for placement of pigtail catheter at next paracentesis with Hospice to help manage at home.  2.  Renal stones with obstruction.  Patient underwent cystoscopy with stone retrieval and bilateral stent placement on 5/30/2018 by Dr. Downs.  He still has a Petersen catheter in place but hopes to go home without the catheter of possible.     Plan  1.  We reviewed the situation again with the patient and his wife and reviewed Dr. Manley's recent office note where hospice was recommended.   His biggest concern is the rapidly reaccumulating ascites fluid.  I spoke with Dr. Mcrae in the interventional radiology Department and he could place a pigtail drain at the time of his next drainage procedure.  If hospice is following the patient at home, the hospice nurse could drain the pigtail catheter once a week to maintain his comfort  without having to make frequent visits back to the hospital. Awaiting visit from [facility] nurse later today.  2.  The patient and wife wish to maximize his time and comfort at home but they did mention to me that when he becomes imminently terminal and is actively dying they would prefer to be admitted to the [facility] palliative care unit for terminal care rather than dying at home.  3.  The patient currently has a follow-up appointment scheduled with Dr. Manley at [facility] office 8:40 AM on 6/12/2018.  They know they can call sooner if he has questions or problems prior to that visit.    Dr Manley will be MD of record for Hospice home care also.    Discussed situation with Dr Yun. Need to get better control of pain prior to DC and may schedule placement of pigtail catheter tomorrow prior to DC home with Hospice. Will be going home with alejandro cath.    Please call again if we can help.              6/3/2018      CC:

## 2018-06-03 NOTE — PROGRESS NOTES
Urology Progress Note  Patient Identification:  Name: Bk Guerra  Age: 67 y.o.  Sex: male  : 1950  MRN: [id number]   Chief Complaint: blood in my urine  History of Present Illness:   WM with metastatic pancreatic cancer, ascites, with right flank pain. Found to have   Cystitis and Renal stones.  Now s/p cysto, bilateral ureteral stents placed with alejandro catheter.      VSSA  Cr 1.06  Hgb 12/Hct 34.3  Complaining of nerve pain in his right buttock.   Denies any bladder spasms or additional leakage around his alejandro catheter.       Physical Exam:  General: NAD, A&O x 3  Abdomen: Soft, non-tender  : Alejandro intact, no phimosis or paraphimosis. Draining bloody urine with small clots. No additional leaks around alejandro.  Extremities: no edema or cyanosis.     Assessment:  Principal Problem:    Acute cystitis without hematuria  Active Problems:    Liver metastases    Hydronephrosis with urinary obstruction due to renal calculus    Ascites    Sepsis    UTI (urinary tract infection)    Pancreatic cancer      Plan:  Ok to go home with alejandro catheter  Will follow and treat ureteral stones as outpatient, contact [facility],  at [phone], to schedule follow up.  Continue Flomax    Thank you for this consult.   Paula Goodson  [facility]

## 2018-06-03 NOTE — PLAN OF CARE
Problem: Patient Care Overview  Goal: Plan of Care Review  Outcome: Ongoing (interventions implemented as appropriate)   06/03/18 1522   Coping/Psychosocial   Plan of Care Reviewed With patient   Plan of Care Review   Progress declining   OTHER   Outcome Summary Patient pain level increased today. PRN medications not as effective. Gabapentin added to assist with nerve pain. PO intake decreased. Patient and wife have agreed to go home with [facility] after speaking to them on the phone. Now considering moving to palliative unit prior to going home with noted decline.      Goal: Individualization and Mutuality  Outcome: Ongoing (interventions implemented as appropriate)    Goal: Discharge Needs Assessment  Outcome: Ongoing (interventions implemented as appropriate)    Goal: Interprofessional Rounds/Family Conf  Outcome: Ongoing (interventions implemented as appropriate)      Problem: Fall Risk (Adult)  Goal: Absence of Fall  Outcome: Ongoing (interventions implemented as appropriate)      Problem: Skin Injury Risk (Adult)  Goal: Skin Health and Integrity  Outcome: Ongoing (interventions implemented as appropriate)      Problem: Paracentesis, Abdominal (Adult)  Goal: Signs and Symptoms of Listed Potential Problems Will be Absent, Minimized or Managed (Paracentesis, Abdominal)  Outcome: Ongoing (interventions implemented as appropriate)      Problem: Urinary Tract Infection (Adult)  Goal: Signs and Symptoms of Listed Potential Problems Will be Absent, Minimized or Managed (Urinary Tract Infection)  Outcome: Ongoing (interventions implemented as appropriate)

## 2018-06-04 NOTE — PROGRESS NOTES
[facility]  Arnel Downs MD     LOS: 5 days   Patient Care Team:  Fran Manley MD as PCP - General (Hematology and Oncology)  Fran Manley MD as PCP - Claims Attributed  Fran Manley MD as Consulting Physician (Hematology and Oncology)  Lanie George MD as Referring Physician (Cardiology)        Subjective     Interval History:     Patient Complaints: Pain better controlled.  A little right hip pain  History taken from: patient chart family    Review of Systems:   All systems were reviewed and were negative except for pain control.  No shortness of air presently.    Objective     Vital Signs  Temp:  [97.2 °F (36.2 °C)-98.2 °F (36.8 °C)] 97.2 °F (36.2 °C)  Heart Rate:  [75-91] 78  Resp:  [16-18] 18  BP: ()/(55-64) 110/64    Physical Exam:     General Appearance:    Alert, cooperative, in no acute distress   Head:    Normocephalic, without obvious abnormality, atraumatic   Eyes:            Lids and lashes normal, conjunctivae and sclerae normal, no   icterus, no pallor, corneas clear, PERRLA   Ears:    Ears appear intact with no abnormalities noted   Throat:   No oral lesions, no thrush, oral mucosa moist   Neck:   No adenopathy, supple, trachea midline,    Back:     No kyphosis present, no scoliosis present, no skin lesions,      erythema or scars, no tenderness to percussion or                   palpation,   range of motion normal   Lungs:     Clear to auscultation,respirations regular, even and                  unlabored    Heart:    Regular rhythm and normal rate, normal S1 and S2, no            murmur, no gallop, no rub, no click   Chest Wall:    No abnormalities observed   Abdomen:     Normal bowel sounds, no masses, no organomegaly, soft        non-tender, non-distended, no guarding, no rebound                tenderness   Genital/Rectal:     Deferred    Extremities:   Moves all extremities well, no edema, no cyanosis, no             redness       Skin:   No bleeding, bruising or rash        Neurologic:   Cranial nerves 2 - 12 grossly intact, sensation intact,        Results Review:     I reviewed the patient's new clinical results.    No results found for this or any previous visit (from the past 24 hour(s)).    Medication Review:   Current Facility-Administered Medications:   •  acetaminophen (TYLENOL) tablet 650 mg, 650 mg, Oral, Q4H PRN **OR** acetaminophen (TYLENOL) 160 MG/5ML solution 650 mg, 650 mg, Oral, Q4H PRN **OR** acetaminophen (TYLENOL) suppository 650 mg, 650 mg, Rectal, Q4H PRN, Margret Yun MD  •  acetaminophen (TYLENOL) tablet 650 mg, 650 mg, Oral, Q4H PRN, Margret Yun MD, 650 mg at 06/03/18 0400  •  ALPRAZolam (XANAX) tablet 0.5 mg, 0.5 mg, Oral, BID PRN, Margret Yun MD, 0.5 mg at 06/04/18 1015  •  diphenoxylate-atropine (LOMOTIL) 2.5-0.025 MG per tablet 1 tablet, 1 tablet, Oral, Q2H PRN, Margret Yun MD  •  doxepin (SINEquan) capsule 25 mg, 25 mg, Oral, Nightly, Margret Yun MD, 25 mg at 06/03/18 2124  •  famotidine (PEPCID) tablet 20 mg, 20 mg, Oral, QAM, Margret Yun MD, 20 mg at 06/04/18 0724  •  gabapentin (NEURONTIN) capsule 200 mg, 200 mg, Oral, Q12H, Margret Yun MD, 200 mg at 06/04/18 0844  •  Glycerin-Hypromellose- (ARTIFICIAL TEARS) 0.2-0.2-1 % ophthalmic solution solution 1 drop, 1 drop, Both Eyes, Q30 Min PRN, Margret Yun MD  •  glycopyrrolate (ROBINUL) injection 0.2 mg, 0.2 mg, Intravenous, Q2H PRN **OR** glycopyrrolate (ROBINUL) injection 0.2 mg, 0.2 mg, Subcutaneous, Q2H PRN **OR** glycopyrrolate (ROBINUL) injection 0.4 mg, 0.4 mg, Intravenous, Q2H PRN **OR** glycopyrrolate (ROBINUL) injection 0.4 mg, 0.4 mg, Subcutaneous, Q2H PRN, Margret Yun MD  •  HYDROmorphone (DILAUDID) injection 0.5 mg, 0.5 mg, Intravenous, Q1H PRN, Margret Yun MD, 0.5 mg at 06/04/18 1644  •  HYDROmorphone (DILAUDID) injection 1 mg, 1 mg, Intravenous, Q1H PRN, Margret Yun MD  •  lactated  ringers infusion, 9 mL/hr, Intravenous, Continuous, Emmie Henriquez MD, Last Rate: 9 mL/hr at 05/30/18 1515  •  LORazepam (ATIVAN) injection 0.5 mg, 0.5 mg, Intravenous, Q1H PRN **OR** LORazepam (ATIVAN) 2 MG/ML concentrated solution 0.5 mg, 0.5 mg, Sublingual, Q1H PRN, Margret Yun MD  •  LORazepam (ATIVAN) injection 1 mg, 1 mg, Intravenous, Q1H PRN **OR** LORazepam (ATIVAN) 2 MG/ML concentrated solution 1 mg, 1 mg, Sublingual, Q1H PRN, Margret Yun MD  •  LORazepam (ATIVAN) injection 2 mg, 2 mg, Intravenous, Q1H PRN **OR** LORazepam (ATIVAN) 2 MG/ML concentrated solution 2 mg, 2 mg, Sublingual, Q1H PRN, Margret Yun MD  •  magic mouthwash oral supsension 5 mL, 5 mL, Swish & Spit, Q4H PRN, Margret Yun MD  •  [DISCONTINUED] HYDROmorphone (DILAUDID) injection 1 mg, 1 mg, Intravenous, Q1H PRN, 1 mg at 06/04/18 1006 **OR** Morphine injection 4 mg, 4 mg, Intravenous, Q1H PRN **OR** morphine concentrated solution solution 10 mg, 10 mg, Sublingual, Q1H PRN, Margret Yun MD  •  [DISCONTINUED] HYDROmorphone (DILAUDID) injection 0.5 mg, 0.5 mg, Intravenous, Q1H PRN **OR** morphine injection 2 mg, 2 mg, Intravenous, Q1H PRN **OR** morphine concentrated solution solution 5 mg, 5 mg, Sublingual, Q1H PRN, Margret Yun MD  •  multivitamin (THERAGRAN) tablet 1 tablet, 1 tablet, Oral, Daily, Margret Yun MD, 1 tablet at 06/04/18 0844  •  ondansetron (ZOFRAN) tablet 8 mg, 8 mg, Oral, Q8H PRN, Margret Yun MD  •  oxyCODONE (ROXICODONE) immediate release tablet 15 mg, 15 mg, Oral, Q6H PRN, Margret Yun MD, 15 mg at 06/04/18 1638  •  polyethylene glycol 3350 powder (packet), 17 g, Oral, Daily, Margret Yun MD, 17 g at 06/04/18 0845  •  predniSONE (DELTASONE) tablet 20 mg, 20 mg, Oral, Daily, Margret Yun MD, 20 mg at 06/04/18 0844  •  promethazine (PHENERGAN) injection 6.25 mg, 6.25 mg, Intravenous, Q4H PRN **OR** promethazine (PHENERGAN) tablet  6.25 mg, 6.25 mg, Oral, Q4H PRN **OR** promethazine (PHENERGAN) 6.25 MG/5ML syrup 6.25 mg, 6.25 mg, Oral, Q4H PRN **OR** promethazine (PHENERGAN) suppository 6.25 mg, 6.25 mg, Rectal, Q4H PRN, Margret Yun MD  •  sodium chloride 0.9 % flush 1-10 mL, 1-10 mL, Intravenous, PRN, Margret Yun MD, 10 mL at 06/04/18 0889  •  tamsulosin (FLOMAX) 24 hr capsule 0.4 mg, 0.4 mg, Oral, Nightly, Margret Yun MD, 0.4 mg at 06/03/18 212    Assessment/Plan     Principal Problem:    Acute cystitis without hematuria  Active Problems:    Liver metastases    Hydronephrosis with urinary obstruction due to renal calculus    Ascites    Sepsis    UTI (urinary tract infection)    Pancreatic cancer      Status post bilateral ureteral stents    Plan for disposition:Patient has been moved to comfort care only.  I'll visit him in a couple of days.    Arnel Downs MD  06/04/18  6:18 PM      Time: 15+

## 2018-06-04 NOTE — PLAN OF CARE
Problem: Patient Care Overview  Goal: Plan of Care Review  Outcome: Ongoing (interventions implemented as appropriate)   06/03/18 1522 06/03/18 2123 06/04/18 0441   Coping/Psychosocial   Plan of Care Reviewed With --  patient;spouse --    Plan of Care Review   Progress declining --  --    OTHER   Outcome Summary --  --  PRN pain medication given as scheduled, patient able to get some rest. F/C still draining dark urine. Plan to transfer to palliative care later today. VSS, will continue to monitor.     Goal: Individualization and Mutuality  Outcome: Ongoing (interventions implemented as appropriate)    Goal: Discharge Needs Assessment  Outcome: Ongoing (interventions implemented as appropriate)      Problem: Fall Risk (Adult)  Goal: Absence of Fall  Outcome: Ongoing (interventions implemented as appropriate)      Problem: Skin Injury Risk (Adult)  Goal: Skin Health and Integrity  Outcome: Ongoing (interventions implemented as appropriate)      Problem: Paracentesis, Abdominal (Adult)  Goal: Signs and Symptoms of Listed Potential Problems Will be Absent, Minimized or Managed (Paracentesis, Abdominal)  Outcome: Ongoing (interventions implemented as appropriate)      Problem: Nutrition, Imbalanced: Inadequate Oral Intake (Adult)  Goal: Improved Oral Intake  Outcome: Ongoing (interventions implemented as appropriate)    Goal: Prevent Further Weight Loss  Outcome: Ongoing (interventions implemented as appropriate)      Problem: Urinary Tract Infection (Adult)  Goal: Signs and Symptoms of Listed Potential Problems Will be Absent, Minimized or Managed (Urinary Tract Infection)  Outcome: Ongoing (interventions implemented as appropriate)

## 2018-06-04 NOTE — PLAN OF CARE
Problem: Patient Care Overview  Goal: Plan of Care Review  Outcome: Ongoing (interventions implemented as appropriate)   06/04/18 1028   Coping/Psychosocial   Plan of Care Reviewed With patient;spouse   Plan of Care Review   Progress no change   OTHER   Outcome Summary VSS, pt c/o persistent uncontrolled pain, meds adjusted, pain management consulted, plan to tx to palliative care, CTM       Problem: Fall Risk (Adult)  Goal: Absence of Fall  Outcome: Ongoing (interventions implemented as appropriate)      Problem: Skin Injury Risk (Adult)  Goal: Skin Health and Integrity  Outcome: Ongoing (interventions implemented as appropriate)      Problem: Nutrition, Imbalanced: Inadequate Oral Intake (Adult)  Goal: Improved Oral Intake  Outcome: Ongoing (interventions implemented as appropriate)      Problem: Urinary Tract Infection (Adult)  Goal: Signs and Symptoms of Listed Potential Problems Will be Absent, Minimized or Managed (Urinary Tract Infection)  Outcome: Ongoing (interventions implemented as appropriate)

## 2018-06-04 NOTE — PROGRESS NOTES
Name: Bk Guerra ADMIT: 2018   : 1950  PCP: Fran Manley MD    MRN: [id number] LOS: 5 days   AGE/SEX: 67 y.o. male    ROOM: [facility]/   Subjective    Patient underwent bilateral ureteral stent insertion for renal stones  He also had a large volume paracentesis sees of 15 L on   Still has blood tinged urine  Patient is having a lot of pain    Brief hospital course since admission:  Metastatic pancreatic cancer  Recurrent ascites with large volume paracenteses.  2 weeks ago he had a 19 L removed and today (2018) he had 15 L removed.  He normally gets paracentesis sees twice a month.  Dr. Manley is thinking of changing paracentesis sees to weekly.  He is also talking about hospice and palliative care.    I have reviewed past medical history, social hisotory, family history, allergies.  No changes from admission note.      Review of Systems   Constitutional: Positive for fatigue.   Respiratory: Positive for shortness of breath. Negative for wheezing.    Cardiovascular: Positive for leg swelling. Negative for chest pain and palpitations.   Gastrointestinal: Positive for abdominal distention.          Objective   Vital Signs  Temp:  [97.9 °F (36.6 °C)-98.2 °F (36.8 °C)] 98.2 °F (36.8 °C)  Heart Rate:  [75-91] 91  Resp:  [16-18] 18  BP: ()/(55-61) 91/60  SpO2:  [92 %-93 %] 93 %  on  Flow (L/min):  [2.5-3] 2.5;   Device (Oxygen Therapy): nasal cannula  Body mass index is 24.64 kg/m².    Intake/Output Summary (Last 24 hours) at 18 0959  Last data filed at 18 0840   Gross per 24 hour   Intake              170 ml   Output             1100 ml   Net             -930 ml       Physical Exam   Constitutional: He is oriented to person, place, and time. He appears well-developed and well-nourished. He appears ill (chronically).   HENT:   Head: Atraumatic.   Eyes: Pupils are equal, round, and reactive to light. No scleral icterus.   Cardiovascular: Normal rate and regular rhythm.     Pulmonary/Chest: No accessory muscle usage. No respiratory distress. He has decreased breath sounds. He has no wheezes.   Abdominal: Soft. Normal appearance and bowel sounds are normal. He exhibits no ascites. There is no hepatosplenomegaly.   Neurological: He is alert and oriented to person, place, and time.   Skin: No laceration and no petechiae noted. No cyanosis. Nails show no clubbing.   Psychiatric: He has a normal mood and affect. His behavior is normal.   Vitals reviewed.      Results Review:      Results from last 7 days  Lab Units 06/03/18  0357 06/02/18  0417 06/01/18  0353   WBC 10*3/mm3 9.91 9.96 8.84   HEMOGLOBIN g/dL 12.0* 11.9* 11.4*   HEMATOCRIT % 34.3* 35.4* 34.0*   PLATELETS 10*3/mm3 165 174 148       Results from last 7 days  Lab Units 06/03/18  0357 06/02/18 0417 06/01/18  0353   SODIUM mmol/L 130* 130* 132*   POTASSIUM mmol/L 5.0 4.8 4.6   CHLORIDE mmol/L 92* 92* 94*   CO2 mmol/L 26.9 27.4 29.6*   BUN mg/dL 25* 27* 30*   CREATININE mg/dL 1.06 1.11 1.19   GLUCOSE mg/dL 99 98 111*   CALCIUM mg/dL 8.7 8.6 8.8       Results from last 7 days  Lab Units 05/31/18  0407   INR  1.19*     Hemoglobin A1C:  Lab Results   Component Value Date    HGBA1C 6.2 (H) 03/27/2015     Glucose Range:No results found for: POCGLU      ceftriaxone 1 g Intravenous Q24H   doxepin 25 mg Oral Nightly   famotidine 20 mg Oral QAM   gabapentin 200 mg Oral Q12H   multivitamin 1 tablet Oral Daily   polyethylene glycol 17 g Oral Daily   predniSONE 20 mg Oral Daily   tamsulosin 0.4 mg Oral Nightly       lactated ringers 9 mL/hr Last Rate: 9 mL/hr (05/30/18 1515)   Diet Regular  Assessment/Plan       Assessment/Plan      Active Hospital Problems (** Indicates Principal Problem)    Diagnosis Date Noted   • **Acute cystitis without hematuria [N30.00] 05/30/2018   • Sepsis [A41.9] 05/30/2018   • UTI (urinary tract infection) [N39.0] 05/30/2018   • Pancreatic cancer [C25.9] 05/30/2018   • Ascites [R18.8] 05/15/2018   • Hydronephrosis  with urinary obstruction due to renal calculus [N13.2] 10/31/2017   • Liver metastases [C78.7] 04/11/2016      Resolved Hospital Problems    Diagnosis Date Noted Date Resolved   No resolved problems to display.     · Hydronephrosis secondary to kidney stones.  Patient had stents placed and urology is following. Discussed with Dr. Downs and he says that the stents can stay up to 3 months.  · Metastatic pancreatic cancer with recurrent ascites and a large volume paracenteses every 2 weeks.  Will consult oncology to follow him and also talk to the family about hospice and palliative care.  The wife and the patient tells me that they have discussed with Dr. Manley in the past and wanted to have a plan.  They would like to try out paracenteses on a weekly basis and see how he tolerates it  · Greatly appreciate Dr. Gale and Dr. Downs's help in this complicated patient.  I'm going to leave the patient today in the hospital and possibly discharge in Sunday.  The plan is to remove the right ureteral stent next week as an outpatient and place a pigtail catheter during next paracentesis  and also coordinate home health care or hospice care  · Patient complains of no pain I have started him on gabapentin.  · In the pigtail placed in the morning in preparation for discharge    Long discussion with the wife and the patient.  I'm going to cancel the pigtail catheter placement and transfer him to palliative care for comfort measures only.      Margret Yun MD  [facility]  06/04/18

## 2018-06-04 NOTE — CONSULTS
Arrived to unit, reviewed records and faxed to HMR. Spoke with OLIVIA King to discuss patient's disease progression. Attempted to meet with patient and family, however patient requested to meet when his spouse could be present. Patient placed on my schedule for tomorrow at 1700 per pt/family request.       Thanks for the referral and opportunity to care for this patient.    Munir Herrera RN, BSN  Referral and admission coordinator  [phone]

## 2018-06-04 NOTE — CONSULTS
[facility]    I had received a call to see the patient in consultation.  At that time he was on 4 E.  He has since been transferred to palliative care on 4 [facility].  Review the chart indicates that any consultation for me has been canceled.  Please be advised the patient has an implanted intrathecal drug delivery system to include Medtronic SynchroMed II pain pump with 20 cc volume.  This is currently infusing fentanyl 2000 µg per mL at 443.8 µg per day and bupivacaine 10 mg per mL infusing at 2.219 mg per day.  He does have a patient therapy manager which allows him to give himself 65 µg boluses up to 8 times per day.  He was last seen in the office on April 25 28 HEENT.  His pump needs refilling no later then July 7, 2018.  If needed, I can be reconsulted for pump refill.

## 2018-06-04 NOTE — PLAN OF CARE
Problem: Patient Care Overview  Goal: Plan of Care Review  Outcome: Ongoing (interventions implemented as appropriate)   06/04/18 1732   Coping/Psychosocial   Plan of Care Reviewed With patient;spouse;son   Plan of Care Review   Progress no change   OTHER   Outcome Summary Pt tx to unit for palliative care this afternoon. Wife and sons have been at bedside. Pt is oriented. Pain pump located in [facility], followed by Dr. Galeana outpatient, pain management consulted inpatient. Followed by CBC. Mediport in left chest c/d/i with blood return. Pt complains of right sided burning in hip/buttock/occasionally down leg. Medicated with oxycodone and dilaudid this afternoon. Will continue to monitor and provide comfort care.     Goal: Individualization and Mutuality  Outcome: Ongoing (interventions implemented as appropriate)    Goal: Discharge Needs Assessment  Outcome: Ongoing (interventions implemented as appropriate)      Problem: Fall Risk (Adult)  Goal: Absence of Fall  Outcome: Ongoing (interventions implemented as appropriate)      Problem: Skin Injury Risk (Adult)  Goal: Skin Health and Integrity  Outcome: Ongoing (interventions implemented as appropriate)      Problem: Paracentesis, Abdominal (Adult)  Goal: Signs and Symptoms of Listed Potential Problems Will be Absent, Minimized or Managed (Paracentesis, Abdominal)  Outcome: Ongoing (interventions implemented as appropriate)      Problem: Nutrition, Imbalanced: Inadequate Oral Intake (Adult)  Goal: Improved Oral Intake  Outcome: Ongoing (interventions implemented as appropriate)    Goal: Prevent Further Weight Loss  Outcome: Ongoing (interventions implemented as appropriate)      Problem: Urinary Tract Infection (Adult)  Goal: Signs and Symptoms of Listed Potential Problems Will be Absent, Minimized or Managed (Urinary Tract Infection)  Outcome: Ongoing (interventions implemented as appropriate)      Problem: Dying Patient, Actively (Adult)  Goal: Identify Related Risk  Factors and Signs and Symptoms  Outcome: Ongoing (interventions implemented as appropriate)    Goal: Comfort/Pain Control  Outcome: Ongoing (interventions implemented as appropriate)    Goal: Peace/Preservation of Dignity During the Dying Process  Outcome: Ongoing (interventions implemented as appropriate)

## 2018-06-05 NOTE — PLAN OF CARE
Problem: Patient Care Overview  Goal: Plan of Care Review  Outcome: Ongoing (interventions implemented as appropriate)   06/05/18 8780   Coping/Psychosocial   Plan of Care Reviewed With patient;spouse   Plan of Care Review   Progress no change   OTHER   Outcome Summary Pt medicated q4h with oxycodone and dilaudid x3 IV for right hip/buttock burning and right leg numbess and tingling. Pt moves self in bed. A/o x4. Wife at bedside throughout shift. Met with [facility] at 1700 and agreeable to HSB, awaiting orders from MD. F/c in place. Good appetite for meals and had ensure during lunch. Will continue to monitor and provide comfort care.     Goal: Individualization and Mutuality  Outcome: Ongoing (interventions implemented as appropriate)      Problem: Fall Risk (Adult)  Goal: Absence of Fall  Outcome: Ongoing (interventions implemented as appropriate)      Problem: Skin Injury Risk (Adult)  Goal: Skin Health and Integrity  Outcome: Ongoing (interventions implemented as appropriate)      Problem: Paracentesis, Abdominal (Adult)  Goal: Signs and Symptoms of Listed Potential Problems Will be Absent, Minimized or Managed (Paracentesis, Abdominal)  Outcome: Ongoing (interventions implemented as appropriate)      Problem: Nutrition, Imbalanced: Inadequate Oral Intake (Adult)  Goal: Improved Oral Intake  Outcome: Ongoing (interventions implemented as appropriate)    Goal: Prevent Further Weight Loss  Outcome: Ongoing (interventions implemented as appropriate)      Problem: Urinary Tract Infection (Adult)  Goal: Signs and Symptoms of Listed Potential Problems Will be Absent, Minimized or Managed (Urinary Tract Infection)  Outcome: Ongoing (interventions implemented as appropriate)      Problem: Dying Patient, Actively (Adult)  Goal: Identify Related Risk Factors and Signs and Symptoms  Outcome: Ongoing (interventions implemented as appropriate)    Goal: Comfort/Pain Control  Outcome: Ongoing (interventions implemented as  appropriate)    Goal: Peace/Preservation of Dignity During the Dying Process  Outcome: Ongoing (interventions implemented as appropriate)

## 2018-06-05 NOTE — NURSING NOTE
Arrived to unit, reviewed records and faxed to HMR. Spoke with Christine to discuss patient's disease progression. Met with patient and spouse at bedside to discuss patient's prognosis and goals of care. Provided detailed EOS. Patient and family agreeable to [facility] scattered bed admission. Obtained consents via Spouse-Nichole per patient request. Spoke with HMD who confirmed patient is HSB appropriate. Spoke with Dr. Yun who stated he would admit the patient to \[facility]"" scattered [facility] tomorrow. Updated family and dated consents for 6/6/18. Patient placed on my schedule to complete HSB admission tomorrow. Updated OLIVIA King.     Thanks for the referral and opportunity to care for this patient.    Munir Herrera, RN, BSN  Referral and admission coordinator  [phone]

## 2018-06-05 NOTE — PROGRESS NOTES
Discharge Planning Assessment  [facility]     Patient Name: Bk Guerra  MRN: [id number]  Today's Date: 6/5/2018    Admit Date: 5/30/2018          Discharge Needs Assessment    No documentation.             Discharge Plan     Row Name 06/05/18 1138       Plan    Plan Comments The patient was transferred to [facility] from [address] [place] [address] on 6/4/18 @ 11:18. The patient is palliative. [facility] to meet with the family and evaluate the patient on 6/5/18 @ 17:00. CCP will follow for any needs that may arise. MITRA Castaneda RN, CCP.         Destination     No service coordination in this encounter.      Durable Medical Equipment     No service coordination in this encounter.      Dialysis/Infusion     No service coordination in this encounter.      Home Medical Care     No service coordination in this encounter.      Social Care     No service coordination in this encounter.                Demographic Summary    No documentation.           Functional Status    No documentation.           Psychosocial    No documentation.           Abuse/Neglect    No documentation.           Legal    No documentation.           Substance Abuse    No documentation.           Patient Forms    No documentation.         Sapna Castaneda RN

## 2018-06-05 NOTE — NURSING NOTE
Welcomed and educated patient and wife to PC unit. Discussed goals of care. Patient and wife stated that their plan is to get him to a point of comfort/decreased pain and then the MD was to reassess the need for a drain to be placed for comfort in a couple of days. Both patient and wife state they have a good understanding of PC and also have a desire to meet with [facility]. [facility] will meet with family at 5pm Wednesday per Munir with [facility].

## 2018-06-05 NOTE — PLAN OF CARE
Problem: Patient Care Overview  Goal: Plan of Care Review  Outcome: Ongoing (interventions implemented as appropriate)  Pt rested well this NOC.  Medicated with oxycodone q 6 hours and dilaudid x 1 for pain this am not relieved by oxycodone, pt also verbalizes relief with start of gabapentin.  Up to [facility] with assist, pt had bowel movement after being constipated for 3 days. Bright affect, pt plans to return home followed by [facility]. There is a meeting this afternoon at 5pm with [facility] and family. Will continue to monitor and provide comfort and emotional support per palliative POC.  Goal: Individualization and Mutuality  Outcome: Ongoing (interventions implemented as appropriate)    Goal: Discharge Needs Assessment  Outcome: Ongoing (interventions implemented as appropriate)    Goal: Interprofessional Rounds/Family Conf  Outcome: Ongoing (interventions implemented as appropriate)      Problem: Fall Risk (Adult)  Goal: Absence of Fall  Outcome: Ongoing (interventions implemented as appropriate)      Problem: Skin Injury Risk (Adult)  Goal: Skin Health and Integrity  Outcome: Ongoing (interventions implemented as appropriate)      Problem: Paracentesis, Abdominal (Adult)  Goal: Signs and Symptoms of Listed Potential Problems Will be Absent, Minimized or Managed (Paracentesis, Abdominal)  Outcome: Ongoing (interventions implemented as appropriate)      Problem: Nutrition, Imbalanced: Inadequate Oral Intake (Adult)  Goal: Improved Oral Intake  Outcome: Ongoing (interventions implemented as appropriate)    Goal: Prevent Further Weight Loss  Outcome: Ongoing (interventions implemented as appropriate)      Problem: Urinary Tract Infection (Adult)  Goal: Signs and Symptoms of Listed Potential Problems Will be Absent, Minimized or Managed (Urinary Tract Infection)  Outcome: Ongoing (interventions implemented as appropriate)      Problem: Dying Patient, Actively (Adult)  Goal: Identify Related Risk Factors and Signs and  Symptoms  Outcome: Ongoing (interventions implemented as appropriate)    Goal: Comfort/Pain Control  Outcome: Ongoing (interventions implemented as appropriate)    Goal: Peace/Preservation of Dignity During the Dying Process  Outcome: Ongoing (interventions implemented as appropriate)

## 2018-06-06 NOTE — PLAN OF CARE
Problem: Patient Care Overview  Goal: Plan of Care Review  Outcome: Ongoing (interventions implemented as appropriate)  Pt medicated q 6 hours with oxy IR for pain, he received dilaudid .5mgs x 2 as well. Up with assist to bsc pt strength has improved a little. Goal is to control pain with PO meds and return home with [facility], will be made a scattered bed pt today. Mediport dressing and needle changed per protocol. Will continue to monitor and and provide comfort and support per palliative POC.  Goal: Individualization and Mutuality  Outcome: Ongoing (interventions implemented as appropriate)    Goal: Discharge Needs Assessment  Outcome: Ongoing (interventions implemented as appropriate)    Goal: Interprofessional Rounds/Family Conf  Outcome: Ongoing (interventions implemented as appropriate)      Problem: Fall Risk (Adult)  Goal: Absence of Fall  Outcome: Ongoing (interventions implemented as appropriate)      Problem: Skin Injury Risk (Adult)  Goal: Skin Health and Integrity  Outcome: Ongoing (interventions implemented as appropriate)      Problem: Paracentesis, Abdominal (Adult)  Goal: Signs and Symptoms of Listed Potential Problems Will be Absent, Minimized or Managed (Paracentesis, Abdominal)  Outcome: Ongoing (interventions implemented as appropriate)      Problem: Nutrition, Imbalanced: Inadequate Oral Intake (Adult)  Goal: Improved Oral Intake  Outcome: Ongoing (interventions implemented as appropriate)    Goal: Prevent Further Weight Loss  Outcome: Ongoing (interventions implemented as appropriate)      Problem: Urinary Tract Infection (Adult)  Goal: Signs and Symptoms of Listed Potential Problems Will be Absent, Minimized or Managed (Urinary Tract Infection)  Outcome: Ongoing (interventions implemented as appropriate)      Problem: Dying Patient, Actively (Adult)  Goal: Identify Related Risk Factors and Signs and Symptoms  Outcome: Ongoing (interventions implemented as appropriate)    Goal: Comfort/Pain  Control  Outcome: Ongoing (interventions implemented as appropriate)    Goal: Peace/Preservation of Dignity During the Dying Process  Outcome: Ongoing (interventions implemented as appropriate)

## 2018-06-06 NOTE — PROGRESS NOTES
Case Management Discharge Note    Final Note: [facility] 6[facility]. MITRA Castaneda RN, CCP.     Destination - Selection Complete     Service Request Status Selected Specialties Address Phone Number Fax Number    [facility] Selected Hospice [id number], [facility] [phone]      Durable Medical Equipment     No service coordination in this encounter.      Dialysis/Infusion     No service coordination in this encounter.      Home Medical Care     No service coordination in this encounter.      Social Care     No service coordination in this encounter.             Final Discharge Disposition Code: 51 - hospice medical facility

## 2018-06-06 NOTE — PLAN OF CARE
Problem: Patient Care Overview  Goal: Plan of Care Review  Outcome: Ongoing (interventions implemented as appropriate)   06/06/18 0747 06/06/18 8203   Coping/Psychosocial   Plan of Care Reviewed With patient;spouse --    Plan of Care Review   Progress --  no change   OTHER   Outcome Summary --  Patient has been resting well today. given oxycodone and diluadid, complains of pain. Appetite is still adequate. F/C in place. Pt likes to lay on left side, due to tumor on backside. Wife is at bedside. Will continue to monitor and provide comfort care      Goal: Individualization and Mutuality  Outcome: Ongoing (interventions implemented as appropriate)    Goal: Interprofessional Rounds/Family Conf  Outcome: Ongoing (interventions implemented as appropriate)      Problem: Fall Risk (Adult)  Goal: Absence of Fall  Outcome: Ongoing (interventions implemented as appropriate)      Problem: Skin Injury Risk (Adult)  Goal: Skin Health and Integrity  Outcome: Ongoing (interventions implemented as appropriate)      Problem: Paracentesis, Abdominal (Adult)  Goal: Signs and Symptoms of Listed Potential Problems Will be Absent, Minimized or Managed (Paracentesis, Abdominal)  Outcome: Ongoing (interventions implemented as appropriate)      Problem: Nutrition, Imbalanced: Inadequate Oral Intake (Adult)  Goal: Improved Oral Intake  Outcome: Ongoing (interventions implemented as appropriate)    Goal: Prevent Further Weight Loss  Outcome: Ongoing (interventions implemented as appropriate)      Problem: Urinary Tract Infection (Adult)  Goal: Signs and Symptoms of Listed Potential Problems Will be Absent, Minimized or Managed (Urinary Tract Infection)  Outcome: Ongoing (interventions implemented as appropriate)      Problem: Dying Patient, Actively (Adult)  Goal: Identify Related Risk Factors and Signs and Symptoms  Outcome: Ongoing (interventions implemented as appropriate)    Goal: Comfort/Pain Control  Outcome: Ongoing (interventions  implemented as appropriate)    Goal: Peace/Preservation of Dignity During the Dying Process  Outcome: Ongoing (interventions implemented as appropriate)

## 2018-06-06 NOTE — CONSULTS
visited the patient. After introducing myself and asking how he was, he didn't answer right a way. We sat in a brief moment of silence, and his wife mentioned about how tired he was and that a lot of visitors have come by to see him. She said that he was all talked out and hinted for me to leave. I said that I would keep the visit short and asked one more time how he was doing. The patient then answered about his medical conditions and how he has been decreasing and talked to me for about 10 minutes. I then asked if there was anything I could provide for him this evening. He said no. I told him I would keep my promise to keep it short, and told him and his wife to let the nurse know if they would like another visit. chaplaincy will continue to follow up. For any immediate spiritual care concerns, please page the  through the switchboard.

## 2018-06-06 NOTE — NURSING NOTE
Arrived to unit, reviewed records. Spoke with OLIVIA Mendoza to discuss how patient progressed over night. Met with patient and spouse to assess patient and review EOS. Spoke with OLIVIA Brown with [facility] who confirmed Dr. Yun would admit patient to [facility] G[facility] today. Updated OLIVIA Mendoza and Angela MOSES. [facility] nurse will f/u tomorrow.     Thanks for the referral and opportunity to care for this patient.    Munir Herrera RN, BSN  Referral and admission coordinator   [phone]

## 2018-06-06 NOTE — CONSULTS
visited patient and wife. Pt expressed that he was happy to receive a bath today, however it is very painful for him. Pt asked for prayer. Chaplains prayed with patient for peace and comfort physically and spiritually. For any immediate spiritual care concerns, please page the  through the switchboard.    9

## 2018-06-06 NOTE — PROGRESS NOTES
Name: Bk Guerra ADMIT: 2018   : 1950  PCP: Fran Manley MD    MRN: [id number] LOS: 6 days   AGE/SEX: 67 y.o. male    ROOM: [facility]/   Subjective    Patient underwent bilateral ureteral stent insertion for renal stones  He also had a large volume paracentesis sees of 15 L on   Still has blood tinged urine  Patient is having a lot of pain    Brief hospital course since admission:  Metastatic pancreatic cancer  Recurrent ascites with large volume paracenteses.  2 weeks ago he had a 19 L removed and today (2018) he had 15 L removed.  He normally gets paracentesis sees twice a month.  Dr. Manley is thinking of changing paracentesis sees to weekly.  He is also talking about hospice and palliative care.    I have reviewed past medical history, social hisotory, family history, allergies.  No changes from admission note.      Review of Systems   Constitutional: Positive for fatigue.   Respiratory: Positive for shortness of breath. Negative for wheezing.    Cardiovascular: Positive for leg swelling. Negative for chest pain and palpitations.   Gastrointestinal: Positive for abdominal distention.          Objective   Vital Signs  Temp:  [97.3 °F (36.3 °C)-98.2 °F (36.8 °C)] 97.3 °F (36.3 °C)  Heart Rate:  [80-84] 80  Resp:  [16-18] 18  BP: (102-104)/(61) 104/61  SpO2:  [92 %-94 %] 94 %  on   ;   Device (Oxygen Therapy): room air  Body mass index is 24.64 kg/m².    Intake/Output Summary (Last 24 hours) at 18  Last data filed at 18 1835   Gross per 24 hour   Intake             1080 ml   Output             1650 ml   Net             -570 ml       Physical Exam   Constitutional: He is oriented to person, place, and time. He appears well-developed and well-nourished. He appears ill (chronically).   HENT:   Head: Atraumatic.   Eyes: Pupils are equal, round, and reactive to light. No scleral icterus.   Cardiovascular: Normal rate and regular rhythm.    Pulmonary/Chest: No accessory  muscle usage. No respiratory distress. He has decreased breath sounds. He has no wheezes.   Abdominal: Soft. Normal appearance and bowel sounds are normal. He exhibits no ascites. There is no hepatosplenomegaly.   Neurological: He is alert and oriented to person, place, and time.   Skin: No laceration and no petechiae noted. No cyanosis. Nails show no clubbing.   Psychiatric: He has a normal mood and affect. His behavior is normal.   Vitals reviewed.      Results Review:      Results from last 7 days  Lab Units 06/03/18  0357 06/02/18 0417 06/01/18  0353   WBC 10*3/mm3 9.91 9.96 8.84   HEMOGLOBIN g/dL 12.0* 11.9* 11.4*   HEMATOCRIT % 34.3* 35.4* 34.0*   PLATELETS 10*3/mm3 165 174 148       Results from last 7 days  Lab Units 06/03/18  0357 06/02/18 0417 06/01/18  0353   SODIUM mmol/L 130* 130* 132*   POTASSIUM mmol/L 5.0 4.8 4.6   CHLORIDE mmol/L 92* 92* 94*   CO2 mmol/L 26.9 27.4 29.6*   BUN mg/dL 25* 27* 30*   CREATININE mg/dL 1.06 1.11 1.19   GLUCOSE mg/dL 99 98 111*   CALCIUM mg/dL 8.7 8.6 8.8       Results from last 7 days  Lab Units 05/31/18  0407   INR  1.19*     Hemoglobin A1C:  Lab Results   Component Value Date    HGBA1C 6.2 (H) 03/27/2015     Glucose Range:No results found for: POCGLU      doxepin 25 mg Oral Nightly   famotidine 20 mg Oral QAM   gabapentin 200 mg Oral Q12H   multivitamin 1 tablet Oral Daily   polyethylene glycol 17 g Oral Daily   predniSONE 20 mg Oral Daily   tamsulosin 0.4 mg Oral Nightly       lactated ringers 9 mL/hr Last Rate: 9 mL/hr (05/30/18 1515)   Diet Regular  Assessment/Plan       Assessment/Plan      Active Hospital Problems (** Indicates Principal Problem)    Diagnosis Date Noted   • **Acute cystitis without hematuria [N30.00] 05/30/2018   • Sepsis [A41.9] 05/30/2018   • UTI (urinary tract infection) [N39.0] 05/30/2018   • Pancreatic cancer [C25.9] 05/30/2018   • Ascites [R18.8] 05/15/2018   • Hydronephrosis with urinary obstruction due to renal calculus [N13.2] 10/31/2017    • Liver metastases [C78.7] 04/11/2016      Resolved Hospital Problems    Diagnosis Date Noted Date Resolved   No resolved problems to display.     · Hydronephrosis secondary to kidney stones.  Patient had stents placed and urology is following. Discussed with Dr. Downs and he says that the stents can stay up to 3 months.  · Metastatic pancreatic cancer with recurrent ascites and a large volume paracenteses every 2 weeks.  Will consult oncology to follow him and also talk to the family about hospice and palliative care.  The wife and the patient tells me that they have discussed with Dr. Manley in the past and wanted to have a plan.  They would like to try out paracenteses on a weekly basis and see how he tolerates it  · Greatly appreciate Dr. Gale and Dr. Downs's help in this complicated patient.  I'm going to leave the patient today in the hospital and possibly discharge in Sunday.  The plan is to remove the right ureteral stent next week as an outpatient and place a pigtail catheter during next paracentesis  and also coordinate home health care or hospice care  · Patient complains of no pain I have started him on gabapentin.  · In the pigtail placed in the morning in preparation for discharge    Long discussion with the wife and the patient.  I'm going to cancel the pigtail catheter placement and transfer him to palliative care for comfort measures only.    Will discuss with Angela in MARIAA Yun MD  [facility]  06/05/18

## 2018-06-07 PROBLEM — Z51.5 ADMISSION FOR HOSPICE CARE: Status: ACTIVE | Noted: 2018-01-01

## 2018-06-07 PROBLEM — C79.9 METASTASIS FROM PANCREATIC CANCER (HCC): Status: ACTIVE | Noted: 2018-01-01

## 2018-06-07 PROBLEM — C25.9 METASTASIS FROM PANCREATIC CANCER (HCC): Status: ACTIVE | Noted: 2018-01-01
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
## 2018-06-07 NOTE — DISCHARGE SUMMARY
Name: Bk Guerra  Age: 67 y.o.  Sex: male  :  1950  MRN: [id number]         Primary Care Physician: Fran Manley MD      Date of Admission:  2018  Date of Discharge:  2018      CHIEF COMPLAINT     Abdominal Pain and Flank Pain      DISCHARGE DIAGNOSIS  Active Hospital Problems (** Indicates Principal Problem)    Diagnosis Date Noted   • **Acute cystitis without hematuria [N30.00] 2018   • Sepsis [A41.9] 2018   • UTI (urinary tract infection) [N39.0] 2018   • Pancreatic cancer [C25.9] 2018   • Ascites [R18.8] 05/15/2018   • Hydronephrosis with urinary obstruction due to renal calculus [N13.2] 10/31/2017   • Liver metastases [C78.7] 2016      Resolved Hospital Problems    Diagnosis Date Noted Date Resolved   No resolved problems to display.       SECONDARY DIAGNOSES  Past Medical History:   Diagnosis Date   • Acquired thrombocytopenia    • Anxiety    • Cancer     Metastatic to liver and sacrum    • Coccyx pain     R/T TUMOR   • Constipation    • Cranial nerve paralysis 2013   • Enlarged prostate     NORMAL PSA   • Generalized pain    • GERD (gastroesophageal reflux disease)    • H/O Acrochordon, neck and groin    • History of hypertension     OFF MED APPROX. 1 YEAR   • History of kidney stones    • Hyperlipidemia    • Infiltration, infusion or chemotherapeutic agent     LEFT ARM, ALWAYS HAS BLISTERS   • Liver disease     Metastatic cancer   • Morbid obesity    • Motion sickness    • Neuropathy     FEET AND LEGS   • On antineoplastic chemotherapy     WEEKLY, LAST DOSE WAS 17 5FU AND LEUCOVORIN   • Pancreatic cancer     SPOT ON LIVER AND COCCYX AND LYMPH NODES AROUND LIVER   • Port-a-cath in place     LEFT CHEST   • Sixth nerve palsy of right eye      NOW RESOLVED   • Syncopal episodes 2014    ONE TIME DUE TO LOW B/P   • Syncopal episodes 2014    RIGHT BEFORE DX WITH THE CANCER   • Trigeminy        CONSULTS   Consulting Physician(s)      Provider Relationship Specialty    Arnel Downs MD Consulting Physician Urology    Shaheed Bertrand MD Consulting Physician Pain Medicine           Cristopher Harrington MD, oncology    PROCEDURES PERFORMED  URETEROSCOPY LASER LITHOTRIPSY WITH STENT INSERTION     Paracenteses with 15 L of fluid removal    HOSPITAL COURSE  This is a 67-year-old male with has a metastatic pancreatic cancer with ascites and cirrhosis presented to the [facility], [place], operated by [facility] with abdominal pain for the past 3 days.  He normally uses to get paracentesis every 2 weeks with a large volume of fluid removal.  The cancer has not responded to treatment and Dr. English has talked to him about palliative care.  However patient was admitted with acute abdominal pain on found to have bilateral uretheral stones and hydronephrosis.  He was taken to the operating room and underwent lithotripsy and stent placement.  After that he had a large volume paracentesis  Of 15 L.  Dr. Cristopher Gale also saw the patient and discussed the case with Dr. English.  At this time it was appropriate for the patient to be transferred to palliative unit for comfort care as is prognosis is poor.  Now the patient will be transferred to hospice scattered bed and continue comfort care.      PHYSICAL EXAM  Temp:  [97.5 °F (36.4 °C)-97.8 °F (36.6 °C)] 97.5 °F (36.4 °C)  Heart Rate:  [83-86] 86  Resp:  [16] 16  BP: (93)/(54-61) 93/61  Body mass index is 24.64 kg/m².  Physical Exam  HEENT: Unremarkable, pupils are round equal and reacting to light, looks chronically sick  NECK: No lymphadenopathy, throat is clear,   RESPRATORY SYSTEM: Breath sounds are equal on both sides and are normal, no wheezes or crackles  CARDIOVASULAR SYSTEM: Heart rate is regular without murmur  ABDOMEN: Soft,  Ascites ++, no hepatosplenomegaly.  EXTREMITIES: No cyanosis, clubbing or edema    CONDITION ON DISCHARGE  Stable.      DISCHARGE DISPOSITION   Hospice scattered bed      ALLERGIES  No Known  Allergies    RECENT LABS    Results from last 7 days  Lab Units 06/03/18  0357 06/02/18  0417 06/01/18  0353   WBC 10*3/mm3 9.91 9.96 8.84   HEMOGLOBIN g/dL 12.0* 11.9* 11.4*   HEMATOCRIT % 34.3* 35.4* 34.0*   PLATELETS 10*3/mm3 165 174 148       Results from last 7 days  Lab Units 06/03/18  0357 06/02/18 0417 06/01/18  0353   SODIUM mmol/L 130* 130* 132*   POTASSIUM mmol/L 5.0 4.8 4.6   CHLORIDE mmol/L 92* 92* 94*   CO2 mmol/L 26.9 27.4 29.6*   BUN mg/dL 25* 27* 30*   CREATININE mg/dL 1.06 1.11 1.19   GLUCOSE mg/dL 99 98 111*   CALCIUM mg/dL 8.7 8.6 8.8       Results from last 7 days  Lab Units 05/31/18  0407   INR  1.19*       DIET;  Diet Order   Procedures   • Diet Regular       DISCHARGE MEDICATIONS     Your medication list      ASK your doctor about these medications      Instructions Last Dose Given Next Dose Due   ALPRAZolam 0.5 MG tablet  Commonly known as:  XANAX      TAKE 1 TABLET BY MOUTH TWO TIMES A DAY AS NEEDED FOR ANXIETY.       aspirin 81 MG tablet      Take 81 mg by mouth Daily. LAST DOSE 4/28/17 HELD FOR SURGERY       bumetanide 0.5 MG tablet  Commonly known as:  BUMEX      TAKE 1 TABLET BY MOUTH DAILY.       diphenoxylate-atropine 2.5-0.025 MG per tablet  Commonly known as:  LOMOTIL      TAKE 1 TABLET BY MOUTH FOUR TIMES A DAY AS NEEDED FOR DIARRHEA.       doxepin 25 MG capsule  Commonly known as:  SINEquan      TAKE 1 CAPSULE BY MOUTH AT BEDTIME.       famotidine 20 MG tablet  Commonly known as:  PEPCID      Take 20 mg by mouth every morning.       fentaNYL citrate (PF) 5 mcg/mL in sodium chloride 0.9 %      Infuse  into a venous catheter Continuous. Fentanyl with Bupivacaine (also has booster every 3 hours)       ketorolac 10 MG tablet  Commonly known as:  TORADOL      Take 1 tablet by mouth Every 6 (Six) Hours As Needed for Moderate Pain .       magnesium citrate 1.745 GM/30ML solution solution      Take 30 mL by mouth As Needed.       magnesium oxide 400 MG tablet  Commonly known as:   MAG-OX      Take 400 mg by mouth daily.       meclizine 25 MG tablet  Commonly known as:  ANTIVERT      Take 25 mg by mouth 3 (three) times a day as needed for dizziness.       MULTI VITAMIN MENS PO      Take 1 tablet by mouth Daily.       ondansetron 8 MG tablet  Commonly known as:  ZOFRAN      Take 1 tablet by mouth Every 8 (Eight) Hours As Needed for Nausea or Vomiting.       oxyCODONE 15 MG immediate release tablet  Commonly known as:  ROXICODONE      Take 15 mg by mouth Every 6 (Six) Hours As Needed for Moderate Pain (4-6). PATIENT CAN TAKE 24 IN 24 HOUR PERIOD       polyethylene glycol packet  Commonly known as:  MIRALAX      Take 17 g by mouth daily as needed. MAY TAKE TID PRN CONSTIPATION       predniSONE 10 MG tablet  Commonly known as:  DELTASONE      Take 2 tablets by mouth Daily. Take it at breakfast       spironolactone 25 MG tablet  Commonly known as:  ALDACTONE           sulfamethoxazole-trimethoprim 800-160 MG per tablet  Commonly known as:  BACTRIM DS,SEPTRA DS      Take 1 tablet by mouth 2 (Two) Times a Day.       tamsulosin 0.4 MG capsule 24 hr capsule  Commonly known as:  FLOMAX      Take 1 capsule by mouth Every Night.       TYLENOL PO      Take 1,000 mg by mouth Every 8 (Eight) Hours As Needed. PRN UP TO 6 PER DAY             Future Appointments  Date Time Provider Department Center   6/12/2018 8:00 AM LAB CHAIR 3 ABDELRAHMAN ROGER  LAB [facility]   6/12/2018 8:40 AM MD MADIE ManningK CBC [facility]  [facility]   6/14/2018 11:30 AM JUMANA US 1  [facility] US [facility]   6/21/2018 10:30 AM JUMANA US 1  [facility] US [facility]   6/28/2018 10:30 AM JUMANA US 1  JUMANA US [facility]   7/5/2018 10:30 AM JUMANA US 1  JUMANA US JUMANA   7/12/2018 10:30 AM JUMANA US 1  JUMANA US JUMANA   7/19/2018 10:30 AM JUMANA US 1  JUMANA US JUMANA   7/26/2018 10:30 AM JUMANA US 1  JUMANA US JUMANA   8/2/2018 10:30 AM JUMANA US 1  JUMANA US JUMANA   8/9/2018 10:30 AM JUMANA US 1  JUMANA US JUMANA   8/16/2018 10:30 AM JUMANA US 1  JUMANA US JUMANA   8/23/2018 10:30 AM JUMANA US 1  JUMANA US JUMANA   8/30/2018 10:30 AM  [facility]      Contact information for follow-up providers     Fran Manley MD .    Specialties:  Hematology and Oncology, Oncology, Hematology  Contact information:  [address]  [facility] [address]  [facility] [id number] [phone]                   Contact information for after-discharge care     Destination     [facility] .    Specialty:  Hospice  Contact information:  [address]  [facility] [address] [phone]                              CODE STATUS  Comfort Measures and Allow Natural Death (A-N-D)        Margret Yun MD  [facility]  06/06/18      Time: greater than 35 minutes.

## 2018-06-07 NOTE — PLAN OF CARE
Problem: Patient Care Overview  Goal: Plan of Care Review  Outcome: Ongoing (interventions implemented as appropriate)  Continue symptom management and comfort care   06/07/18 0622   Coping/Psychosocial   Plan of Care Reviewed With patient;spouse   OTHER   Outcome Summary Patient complaing of pain. PO pain meds given Q6 hours with IV Dilaudid for breakthrough pain. Wife at bedside. Pain Mgmt Dr consulted for inadequate pain pump relief. Patient is now HSB.     Goal: Individualization and Mutuality  Outcome: Ongoing (interventions implemented as appropriate)    Goal: Discharge Needs Assessment  Outcome: Ongoing (interventions implemented as appropriate)    Goal: Interprofessional Rounds/Family Conf  Outcome: Ongoing (interventions implemented as appropriate)      Problem: Palliative Care (Adult)  Goal: Identify Related Risk Factors and Signs and Symptoms  Outcome: Ongoing (interventions implemented as appropriate)    Goal: Maximized Comfort  Outcome: Ongoing (interventions implemented as appropriate)    Goal: Enhanced Quality of Life  Outcome: Ongoing (interventions implemented as appropriate)

## 2018-06-07 NOTE — PLAN OF CARE
Problem: Patient Care Overview  Goal: Plan of Care Review  Outcome: Ongoing (interventions implemented as appropriate)   06/07/18 1506   Coping/Psychosocial   Plan of Care Reviewed With patient   OTHER   Outcome Summary Patient had paracentesis with pigtail catheter placement. ~ 8.6 L of fluid drained. PRN oxycodone given for pain control per palliative goals of care and symptom management. Pain management MD to return tomorrow to evaluate pain pump. Will continue to monitor.    Plan of Care Review   Progress no change     Goal: Individualization and Mutuality  Outcome: Ongoing (interventions implemented as appropriate)    Goal: Discharge Needs Assessment  Outcome: Ongoing (interventions implemented as appropriate)    Goal: Interprofessional Rounds/Family Conf  Outcome: Ongoing (interventions implemented as appropriate)      Problem: Palliative Care (Adult)  Goal: Identify Related Risk Factors and Signs and Symptoms  Outcome: Outcome(s) achieved Date Met: 06/07/18    Goal: Maximized Comfort  Outcome: Ongoing (interventions implemented as appropriate)    Goal: Enhanced Quality of Life  Outcome: Ongoing (interventions implemented as appropriate)

## 2018-06-08 NOTE — PLAN OF CARE
Problem: Patient Care Overview  Goal: Plan of Care Review  Outcome: Ongoing (interventions implemented as appropriate)   06/07/18 1854 06/07/18 2004 06/08/18 0620   Coping/Psychosocial   Plan of Care Reviewed With --  patient;spouse --    OTHER   Outcome Summary --  --  Patient was medicated PRN for pain and anxiety. Paracentesis pigtail catheter clamped and in place. Pain management MD to return today to evaluate pain pump. Maintained comfort measures per palliative care protocol. Spouse at bedside. Will continue to monitor vital signs and comfort.   Plan of Care Review   Progress no change --  --      Goal: Individualization and Mutuality  Outcome: Ongoing (interventions implemented as appropriate)    Goal: Discharge Needs Assessment  Outcome: Ongoing (interventions implemented as appropriate)    Goal: Interprofessional Rounds/Family Conf  Outcome: Ongoing (interventions implemented as appropriate)      Problem: Palliative Care (Adult)  Goal: Maximized Comfort  Outcome: Ongoing (interventions implemented as appropriate)    Goal: Enhanced Quality of Life  Outcome: Ongoing (interventions implemented as appropriate)

## 2018-06-08 NOTE — PROGRESS NOTES
[facility] Visit Report    Bk Guerra  [id number]  6/8/2018    Admission R/T [facility] Dx: yes    Reason for [facility] Admission:    Symptom  Management: Pain Control    Nursing/Medication Recommendations:    Psychosocial Issues and Recommendations:    Spiritual Concerns and Recommendations:    [facility] Discharge Plans:  incomplete; patient remains HSB and [facility] will round daily    Review of Visit (Include All Collaboration- including names of hospital and family involved during admission/visit):  Patient resting in bed with wife at the bedside. Patient will wake but not very talkative today. Wife reports patient has had increased pain since paracentesis with pigtail placement yesterday. States pain is in tailbone, r hip and burning to penis. Patient has received roxycodone po x5 which was increased to 30 mg q6 prn From 15mg q6 prn, xanax x3, dilaudid iv x2 and pain pump ws increased to fentanyl 600mg/day and bupivicaine 3mg per day with fentanyl 75mg bolus q1hr max 12 doses in 24 hours. 8.6l of fluid was removed with paracentesis yesterday. No plans to discharge at this time. Will cont to vsiit daily to assess needs and offer support    Deysi Denny RN

## 2018-06-08 NOTE — CONSULTS
Referring Provider: Dr. Stevenson  Reason for Consultation: cancer pain    Patient Care Team:  Fran Manley MD as PCP - General (Hematology and Oncology)  Fran Manley MD as PCP - Claims Attributed  Fran Manley MD as Consulting Physician (Hematology and Oncology)  Lanie George MD as Referring Physician (Cardiology)    Chief complaint increased pain    Subjective .     History of present illness:  Patient is a 67-year-old white male well known to my office with an underlying history of malignant neoplasm of the pancreas with secondary spread to the liver and bile duct and also to bone.  He has underlying cancer related pain.  He has been admitted secondary to increasing pain and also with increasing abdominal ascites.  He is now been referred to outpatient hospice.  He was taken to interventional radiology last night for paracentesis with placement of a more permanent drain to relieve fluid collections well.  He is complaining of markedly increased abdominal pain today.  He has an implanted intrathecal drug delivery system to include Medtronic SynchroMed II pain pump and spinal catheter.  The pump is currently infusing a mixture of fentanyl 2000 µg per mL and bupivacaine 10 mg per mL.  He is asked for the pump to be increased.  He is also able to give himself intermittent boluses through the pump as well.  He also is on oral oxycodone 15 mg up to 4 times per day.  Despite this, he is having significant increased pain.    Review of Systems  Pertinent items are noted in HPI    History  Past Medical History:   Diagnosis Date   • Acquired thrombocytopenia    • Anxiety    • Cancer     Metastatic to liver and sacrum    • Coccyx pain     R/T TUMOR   • Constipation    • Cranial nerve paralysis 2013 6TH   • Enlarged prostate     NORMAL PSA   • Generalized pain    • GERD (gastroesophageal reflux disease)    • H/O Acrochordon, neck and groin    • History of hypertension     OFF MED APPROX. 1 YEAR   •  History of kidney stones    • Hyperlipidemia    • Infiltration, infusion or chemotherapeutic agent     LEFT ARM, ALWAYS HAS BLISTERS   • Liver disease     Metastatic cancer   • Morbid obesity    • Motion sickness    • Neuropathy     FEET AND LEGS   • On antineoplastic chemotherapy     WEEKLY, LAST DOSE WAS 2/25/17 5FU AND LEUCOVORIN   • Pancreatic cancer     SPOT ON LIVER AND COCCYX AND LYMPH NODES AROUND LIVER   • Port-a-cath in place     LEFT CHEST   • Sixth nerve palsy of right eye     2016 NOW RESOLVED   • Syncopal episodes 11/29/2014    ONE TIME DUE TO LOW B/P   • Syncopal episodes 12/2014    RIGHT BEFORE DX WITH THE CANCER   • Trigeminy        Objective     Vital Signs   Temp:  [98.5 °F (36.9 °C)] 98.5 °F (36.9 °C)  Heart Rate:  [85-90] 85  Resp:  [16] 16  BP: ()/(64-67) 99/67    Physical Exam:   67-year-old white male, cachectic, in moderate discomfort.  Alert oriented ×3.  HEENT-unremarkable.  Cardiopulmonary-unremarkable.  Abdomen-soft, tender.  Pump located over the right lower quadrant of the abdominal wall.   exam-deferred.  Neurologic exam-grossly intact.    Results Review:   I reviewed the patient's new clinical results.      Assessment/Plan     Principal Problem:    Metastasis from pancreatic cancer  Active Problems:    Bone metastasis    Hydronephrosis with urinary obstruction due to renal calculus    Ascites    Pancreatic cancer    Admission for hospice care      Assessment:  1.  Pancreatic cancer  2.  Metastases to liver and bone  3.  Refractory cancer pain    Plan:  1.  His pump was accessed through telemetry and I increased his daily infusion by over 30% to fentanyl 600 µg per day and bupivacaine 3 mg per day.  I increased his boluses which are currently at 75 µg of fentanyl so that he can have a bolus every hour for total of 12 and a 24-hour period.  At maximum activation of his patient therapy manager, he will need his pump refilled no later then June 29, 2018.  He currently has 17.8 cc  remaining in his pump and the estimated reimplanted interval of the pump is 59 months.  2.  If he is discharged home, we will make arrangements for pump maintenance at his home.  3.  Unfortunately, to be out of town until this coming Tuesday.  I will talk with my pump nurses in the office about coverage over the weekend for pump titration.    I discussed the patients findings and my recommendations with patient and wife, who is at bedside.    Shaheed Bertrand MD  06/08/18  7:32 AM    Time: More than 50% of time spent in counseling and coordination of care:  Total face-to-face/floor time 30 min.  Time spent in counseling 15 min. Counseling included the following topics: Pain control

## 2018-06-09 NOTE — PLAN OF CARE
Problem: Patient Care Overview  Goal: Plan of Care Review  Outcome: Ongoing (interventions implemented as appropriate)   06/09/18 5152   Coping/Psychosocial   Plan of Care Reviewed With patient;spouse   OTHER   Outcome Summary Pt. continued with palliative care. Pain was controlled with Mariana X 3 doses. Pt had a BM today. Will continue to monitor.   Plan of Care Review   Progress no change     Goal: Individualization and Mutuality  Outcome: Ongoing (interventions implemented as appropriate)    Goal: Discharge Needs Assessment  Outcome: Ongoing (interventions implemented as appropriate)      Problem: Palliative Care (Adult)  Goal: Maximized Comfort  Outcome: Ongoing (interventions implemented as appropriate)    Goal: Enhanced Quality of Life  Outcome: Ongoing (interventions implemented as appropriate)      Problem: Fall Risk (Adult)  Goal: Absence of Fall  Outcome: Ongoing (interventions implemented as appropriate)      Problem: Skin Injury Risk (Adult)  Goal: Skin Health and Integrity  Outcome: Ongoing (interventions implemented as appropriate)

## 2018-06-09 NOTE — PLAN OF CARE
Problem: Patient Care Overview  Goal: Plan of Care Review  Outcome: Ongoing (interventions implemented as appropriate)   06/09/18 1226   Coping/Psychosocial   Plan of Care Reviewed With patient   OTHER   Outcome Summary VSS, pt A &Ox4. wife at bedside. Ambulating to BSC with assist x 2. Pain better managed with scheduled Mariana. Comfort measures maintained. Will continue to monitor    Plan of Care Review   Progress improving       Problem: Palliative Care (Adult)  Goal: Maximized Comfort  Outcome: Ongoing (interventions implemented as appropriate)    Goal: Enhanced Quality of Life  Outcome: Ongoing (interventions implemented as appropriate)      Problem: Fall Risk (Adult)  Goal: Identify Related Risk Factors and Signs and Symptoms  Outcome: Ongoing (interventions implemented as appropriate)    Goal: Absence of Fall  Outcome: Ongoing (interventions implemented as appropriate)      Problem: Skin Injury Risk (Adult)  Goal: Identify Related Risk Factors and Signs and Symptoms  Outcome: Ongoing (interventions implemented as appropriate)    Goal: Skin Health and Integrity  Outcome: Ongoing (interventions implemented as appropriate)

## 2018-06-09 NOTE — PROGRESS NOTES
LOS: 2 days   Primary Care Physician: Fran Manley MD     Subjective   Awake. Increased pain at times    Vital Signs  There is no height or weight on file to calculate BMI.  Temp:  [97.1 °F (36.2 °C)-98.5 °F (36.9 °C)] 97.1 °F (36.2 °C)  Heart Rate:  [85-86] 86  Resp:  [16-20] 20  BP: ()/(58-67) 102/58      Objective:  General Appearance:  Ill-appearing and uncomfortable.    Vital signs: (most recent): Blood pressure 102/58, pulse 86, temperature 97.1 °F (36.2 °C), temperature source Oral, resp. rate 20, SpO2 93 %.    Lungs:  No stridor.  There are decreased breath sounds.  No rales, wheezes or rhonchi.    Heart: Normal rate.  Regular rhythm.  No murmur.   Abdomen: Abdomen is soft and distended.  There are signs of ascites. Bowel sounds are normal.   There is generalized tenderness.   There is no splenomegaly. There is no hepatomegaly.   Extremities: There is no dependent edema.    Neurological: Patient is alert.    Skin:  Warm and dry.              Assessment & Plan      Medication Review: Yes    Active Hospital Problems (** Indicates Principal Problem)    Diagnosis Date Noted   • **Metastasis from pancreatic cancer [C79.9, C25.9] 06/07/2018   • Admission for hospice care [Z51.5] 06/07/2018   • Pancreatic cancer [C25.9] 05/30/2018   • Ascites [R18.8] 05/15/2018   • Hydronephrosis with urinary obstruction due to renal calculus [N13.2] 10/31/2017   • Bone metastasis [C79.51] 04/11/2016      Resolved Hospital Problems    Diagnosis Date Noted Date Resolved   No resolved problems to display.       Assessment/Plan  1. Metastatic pancreatic cancer- I increased oxycodone IR. Cont comfort measures. Not able to manage sx at home. Will remain in hospital under [facility].    Yaneth Dumont MD  06/08/18  10:25 PM

## 2018-06-09 NOTE — PROGRESS NOTES
LOS: 3 days   Primary Care Physician: Fran Manley MD     Subjective   Pain control is better.  He is eating and drinking.  Wife at bedside    Vital Signs  There is no height or weight on file to calculate BMI.  Temp:  [97.1 °F (36.2 °C)-98.3 °F (36.8 °C)] 98.3 °F (36.8 °C)  Heart Rate:  [86-90] 90  Resp:  [14-20] 14  BP: ()/(58-61) 95/61      Objective:  General Appearance:  Ill-appearing and in no acute distress (Looks older than age).    Vital signs: (most recent): Blood pressure 95/61, pulse 90, temperature 98.3 °F (36.8 °C), temperature source Oral, resp. rate 14, SpO2 91 %.    HEENT: (No thrush.  Dry oral mucosa)    Lungs:  He is not in respiratory distress.  No stridor.  There are decreased breath sounds.  No rales, wheezes or rhonchi.  (Anteriorly clear)  Heart: Normal rate.  Regular rhythm.  Positive for murmur.  (Grade 3/6 systolic murmur best at left upper sternal border)  Abdomen: Abdomen is soft and distended.  There are signs of ascites. Bowel sounds are normal.   There is generalized tenderness.   There is no splenomegaly. There is no hepatomegaly.   Extremities: There is dependent edema.  (Trace)  Neurological: Patient is alert.          Assessment & Plan      Medication Review: Yes    Active Hospital Problems (** Indicates Principal Problem)    Diagnosis Date Noted   • **Metastasis from pancreatic cancer [C79.9, C25.9] 06/07/2018   • Admission for hospice care [Z51.5] 06/07/2018   • Pancreatic cancer [C25.9] 05/30/2018   • Ascites [R18.8] 05/15/2018   • Hydronephrosis with urinary obstruction due to renal calculus [N13.2] 10/31/2017   • Bone metastasis [C79.51] 04/11/2016      Resolved Hospital Problems    Diagnosis Date Noted Date Resolved   No resolved problems to display.       Assessment/Plan  1.  Palliative care.  Continue comfort measures.    Yaneth Dumont MD  06/09/18  2:31 PM

## 2018-06-09 NOTE — PROGRESS NOTES
[facility] Visit Report    Bk Guerra  [id number]  6/9/2018    Admission R/T [facility] Dx: yes    Reason for [facility] Admission:Pancreatic CA with mets    Symptom  Management: pain control    Nursing/Medication Recommendations:Continue symptom management of pain with po/IV medications, comfort care for declining patient    Psychosocial Issues and Recommendations:    Spiritual Concerns and Recommendations:    [facility] Discharge Plans:None at this time; patient continues to meet GIP criteria as a [facility] scattered bed patient; MD has been titrating medications for symptom management of pain      Review of Visit:  Visited patient in room P492; patients wife is also at the bedside. Patient is A&Ox3 and very pleasant. Patient tells me that he continues to have pain to right hip, right buttocks and into groin. At this time patient says that pain is tolerable; we did discuss that he also has PRN pain medications available if he feels that he needs something in between his scheduled medications; he v/u. LLQ abdominal drain noted; patient with hyperactive bowel sounds. Abdomen is soft, tender to right side. #18Fr/10cc coude catheter noted; draining very little blood tinged urine. Patient and wife deny any needs at this time; also deny any concerns at this visit. They v/u when I educated to [facility] or concerns. Will continue to visit daily, assess needs of patient/family and provide support      Leslye Choi RN

## 2018-06-10 NOTE — PROGRESS NOTES
"[facility] Visit Report    Bk Guerra  [id number]  6/10/2018    Admission R/T [facility] Dx: yes    Reason for [facility] Admission: Pancreatic CA w/mets    Symptom  Management: pain control    Nursing/Medication Recommendations:Continue scheduled Roxicodone for symptom management of pain and Xanax for anxiety.    Psychosocial Issues and Recommendations:    Spiritual Concerns and Recommendations:    [facility] Discharge Plans:  None at this time; patient continues to meet GIP criteria as a [facility] scattered bed patient. Patient is receiving PO Roxicodone on scheduled basis for pain control and Xanax PO PRN for anxiety    Review of Visit:Patient lying in bed with head up; no s/s distress noted. Wife is at bedside. Patient says he and his wife both \"got good sleep last night\". When asked about pain, patient tells me that since Roxicodone IR has been increased to 30mg q4h w/a his pain is more controlled. In fact he has not required any PRN medications for pain in the last 24 hours. Patient says \"it doesn't necessarily take all of the pain away, but does decrease it quite a bit\". Patient is aware that if needed he does have PRN medications he can have in between for pain. Respirations are even and unlabored, RR 14. HR 77, B/P 101/62, sats 90% on RA per current v/s. Left chest port with dressing, CDI. LLQ abdominal drain WNL; abdomen is soft; bowel sounds still somewhat hyperactive. Last BM noted on 6/9/18. F/C noted with halima to blood tinged urine to bedside drain. Will continue to visit daily, assess needs of patient/family and provide support. Reminded patient and wife if any questions or concerns to call [facility], they v/u        Leslye Choi RN    "

## 2018-06-10 NOTE — PLAN OF CARE
Problem: Patient Care Overview  Goal: Plan of Care Review  Outcome: Ongoing (interventions implemented as appropriate)   06/10/18 0233   Coping/Psychosocial   Plan of Care Reviewed With patient;spouse   OTHER   Outcome Summary Pain control continued with pallative care. Pt still refusing turns, repositions self. Continuing to monitor closely   Plan of Care Review   Progress no change       Problem: Palliative Care (Adult)  Goal: Maximized Comfort  Outcome: Ongoing (interventions implemented as appropriate)    Goal: Enhanced Quality of Life  Outcome: Ongoing (interventions implemented as appropriate)      Problem: Fall Risk (Adult)  Goal: Identify Related Risk Factors and Signs and Symptoms  Outcome: Ongoing (interventions implemented as appropriate)    Goal: Absence of Fall  Outcome: Ongoing (interventions implemented as appropriate)      Problem: Skin Injury Risk (Adult)  Goal: Identify Related Risk Factors and Signs and Symptoms  Outcome: Ongoing (interventions implemented as appropriate)    Goal: Skin Health and Integrity  Outcome: Ongoing (interventions implemented as appropriate)

## 2018-06-10 NOTE — PROGRESS NOTES
LOS: 4 days   Primary Care Physician: Fran Manley MD     Subjective   Pain is fairly well controlled.  Wife at bedside.  Patient is very weak.  Right hand remains weak.  He has to be careful when he is holding a glass. Has paresthesias from chemotherapy    Vital Signs  There is no height or weight on file to calculate BMI.  Temp:  [97 °F (36.1 °C)-97.3 °F (36.3 °C)] 97.3 °F (36.3 °C)  Heart Rate:  [77-80] 77  Resp:  [14-16] 14  BP: ()/(61-62) 101/62      Objective:  General Appearance:  Ill-appearing and in no acute distress.    Vital signs: (most recent): Blood pressure 101/62, pulse 77, temperature 97.3 °F (36.3 °C), temperature source Oral, resp. rate 14, SpO2 90 %.    Lungs:  He is not in respiratory distress.  No stridor.  There are decreased breath sounds.  No rales, wheezes or rhonchi.    Heart: Normal rate.  Regular rhythm.  No murmur.   Abdomen: Abdomen is soft and distended.  There are signs of ascites. Bowel sounds are normal.   There is generalized tenderness.     Extremities: There is dependent edema.  (Trace at ankles, right greater than left.  Right  weaker than left.  Interosseous wasting present both hands)          Assessment & Plan      Medication Review: Yes    Active Hospital Problems (** Indicates Principal Problem)    Diagnosis Date Noted   • **Metastasis from pancreatic cancer [C79.9, C25.9] 06/07/2018   • Admission for hospice care [Z51.5] 06/07/2018   • Pancreatic cancer [C25.9] 05/30/2018   • Ascites [R18.8] 05/15/2018   • Hydronephrosis with urinary obstruction due to renal calculus [N13.2] 10/31/2017   • Bone metastasis [C79.51] 04/11/2016      Resolved Hospital Problems    Diagnosis Date Noted Date Resolved   No resolved problems to display.       Assessment/Plan  1.  Palliative care.  Continue comfort measures.    Yaneth Dumont MD  06/10/18  3:20 PM

## 2018-06-10 NOTE — PLAN OF CARE
Problem: Patient Care Overview  Goal: Plan of Care Review  Outcome: Ongoing (interventions implemented as appropriate)   06/10/18 1527   Coping/Psychosocial   Plan of Care Reviewed With patient;spouse   OTHER   Outcome Summary Goals of care are pain control. Scheduled Oxycodone and PRN Dilaudid controlling pain. Wife at bsd all day.   Plan of Care Review   Progress no change       Problem: Palliative Care (Adult)  Goal: Maximized Comfort  Outcome: Ongoing (interventions implemented as appropriate)    Goal: Enhanced Quality of Life  Outcome: Ongoing (interventions implemented as appropriate)      Problem: Fall Risk (Adult)  Goal: Identify Related Risk Factors and Signs and Symptoms  Outcome: Outcome(s) achieved Date Met: 06/10/18    Goal: Absence of Fall  Outcome: Ongoing (interventions implemented as appropriate)      Problem: Skin Injury Risk (Adult)  Goal: Identify Related Risk Factors and Signs and Symptoms  Outcome: Outcome(s) achieved Date Met: 06/10/18    Goal: Skin Health and Integrity  Outcome: Ongoing (interventions implemented as appropriate)

## 2018-06-11 NOTE — PLAN OF CARE
"Problem: Patient Care Overview  Goal: Plan of Care Review  Outcome: Ongoing (interventions implemented as appropriate)   06/10/18 2235 06/11/18 0530   Coping/Psychosocial   Plan of Care Reviewed With patient;spouse --    OTHER   Outcome Summary --  Pt requested 1mg dil X3 within 5 hours and pr reported pain scale increase. Pt would benefit from a dilaudid pca. Pt refusing turns and \"turns\" himself. Continue comfort care.    Plan of Care Review   Progress --  no change     Goal: Individualization and Mutuality  Outcome: Ongoing (interventions implemented as appropriate)    Goal: Discharge Needs Assessment  Outcome: Ongoing (interventions implemented as appropriate)      Problem: Palliative Care (Adult)  Goal: Maximized Comfort  Outcome: Ongoing (interventions implemented as appropriate)    Goal: Enhanced Quality of Life  Outcome: Ongoing (interventions implemented as appropriate)      Problem: Fall Risk (Adult)  Goal: Absence of Fall  Outcome: Ongoing (interventions implemented as appropriate)      Problem: Skin Injury Risk (Adult)  Goal: Skin Health and Integrity  Outcome: Ongoing (interventions implemented as appropriate)        "

## 2018-06-11 NOTE — PROGRESS NOTES
[facility] Visit Report    Bk Guerra  [id number]  6/11/2018    Admission R/T [facility] Dx: yes    Reason for [facility] Admission:    Symptom  Management: Pain Control    Nursing/Medication Recommendations:    Psychosocial Issues and Recommendations:    Spiritual Concerns and Recommendations:    [facility] Discharge Plans:  incomplete; patient remains [facility] and [facility] will round daily    Review of Visit (Include All Collaboration- including names of hospital and family involved during admission/visit):  patient resting in bed iwth spouse at the bedside. patient awake alert and oriented. denies pain at this time. patient states he feels much better than yesterday states his pain was not easily controlled yesterday. patietn and wife verbalized no new concerns at this time. patient recieved iv dilaudid x4 in adidtion to his roxycodone 30mg q4 and implanted pain pump. no plans to discharge at this time. will cont to visit daily to assess needs and offer support      Deysi Denny RN
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
## 2018-06-11 NOTE — PROGRESS NOTES
LOS: 5 days   Primary Care Physician: Fran Manley MD     Subjective   Pain escalated yesterday.  Pain management nurse adjusted pain pump doses of fentanyl and bupivacaine    Vital Signs  There is no height or weight on file to calculate BMI.  Temp:  [97 °F (36.1 °C)] 97 °F (36.1 °C)  Heart Rate:  [77-84] 84  Resp:  [14-15] 14  BP: ()/(57-66) 102/66      Objective:  General Appearance:  In no acute distress (Chronically ill appearing.  Looks much older than age).    Vital signs: (most recent): Blood pressure 102/66, pulse 84, temperature 97 °F (36.1 °C), temperature source Oral, resp. rate 14, SpO2 91 %.    Lungs:  He is not in respiratory distress.  No stridor.  There are decreased breath sounds.  No rales, wheezes or rhonchi.    Heart: Normal rate.  Regular rhythm.  Positive for murmur.  (Grade 3/6 systolic murmur heard easily left upper sternal border)  Abdomen: Abdomen is soft and distended.  There are signs of ascites. Bowel sounds are normal.   There is no abdominal tenderness.   There is no splenomegaly. There is no hepatomegaly.   Extremities: There is no dependent edema.    Neurological: Patient is alert.    Skin:  Warm.          Results Review:    I reviewed the patient's new clinical results.              Results from last 7 days  Lab Units 06/07/18  1334   INR  1.33*     Hemoglobin A1C:  Lab Results   Component Value Date    HGBA1C 6.2 (H) 03/27/2015       Glucose Range:No results found for: POCGLU    Lab Results   Component Value Date    [id number] 417 06/13/2017       Lab Results   Component Value Date    TSH 3.38 03/31/2015       Assessment & Plan      Medication Review: Yes    Active Hospital Problems (** Indicates Principal Problem)    Diagnosis Date Noted   • **Metastasis from pancreatic cancer [C79.9, C25.9] 06/07/2018   • Admission for hospice care [Z51.5] 06/07/2018   • Pancreatic cancer [C25.9] 05/30/2018   • Ascites [R18.8] 05/15/2018   • Hydronephrosis with urinary obstruction  due to renal calculus [N13.2] 10/31/2017   • Bone metastasis [C79.51] 04/11/2016      Resolved Hospital Problems    Diagnosis Date Noted Date Resolved   No resolved problems to display.       Assessment/Plan  1.  Palliative care.  Pain pump adjusted with improvement in pain control.  Continue comfort measures.  Patient has noticed some increase in abdominal girth.  May need to use the drain in place for management of ascites.    Yaneth Dumont MD  06/11/18  3:37 PM

## 2018-06-11 NOTE — PLAN OF CARE
Problem: Patient Care Overview  Goal: Plan of Care Review  Outcome: Ongoing (interventions implemented as appropriate)   06/11/18 1948   Coping/Psychosocial   Plan of Care Reviewed With patient;spouse   OTHER   Outcome Summary Pt's pain has been controlled this shift with highest rating 4/10 on scheduled oxycodone. No breakthrough dilaudid needed. Pain pump dosages increased by Aby with pain management. Dr. Dumont made aware. CBC consulted per pt request due to previous scheduled appt with Dr. English. CBC rounded, draining left sided abd drain to gravity for a couple hours. Up to BSC this evening. Wife at bedside. Will continue to monitor and provide comfort care.   Plan of Care Review   Progress no change     Goal: Individualization and Mutuality  Outcome: Ongoing (interventions implemented as appropriate)    Goal: Discharge Needs Assessment  Outcome: Ongoing (interventions implemented as appropriate)      Problem: Palliative Care (Adult)  Goal: Maximized Comfort  Outcome: Ongoing (interventions implemented as appropriate)    Goal: Enhanced Quality of Life  Outcome: Ongoing (interventions implemented as appropriate)      Problem: Fall Risk (Adult)  Goal: Absence of Fall  Outcome: Ongoing (interventions implemented as appropriate)      Problem: Skin Injury Risk (Adult)  Goal: Skin Health and Integrity  Outcome: Ongoing (interventions implemented as appropriate)

## 2018-06-12 NOTE — PLAN OF CARE
Problem: Patient Care Overview  Goal: Plan of Care Review  Outcome: Ongoing (interventions implemented as appropriate)   06/12/18 0413   Coping/Psychosocial   Plan of Care Reviewed With patient;spouse   OTHER   Outcome Summary Pt medicated x1 with IV Dilaudid for breakthrough pain. Slept well through the night. Spouse at bedside. Will continue to monitor per comfort care.    Plan of Care Review   Progress no change     Goal: Individualization and Mutuality  Outcome: Ongoing (interventions implemented as appropriate)    Goal: Discharge Needs Assessment  Outcome: Ongoing (interventions implemented as appropriate)    Goal: Interprofessional Rounds/Family Conf  Outcome: Ongoing (interventions implemented as appropriate)      Problem: Palliative Care (Adult)  Goal: Maximized Comfort  Outcome: Ongoing (interventions implemented as appropriate)    Goal: Enhanced Quality of Life  Outcome: Ongoing (interventions implemented as appropriate)      Problem: Fall Risk (Adult)  Goal: Absence of Fall  Outcome: Ongoing (interventions implemented as appropriate)      Problem: Skin Injury Risk (Adult)  Goal: Skin Health and Integrity  Outcome: Ongoing (interventions implemented as appropriate)
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
## 2018-06-12 NOTE — PLAN OF CARE
Problem: Patient Care Overview  Goal: Plan of Care Review  Outcome: Ongoing (interventions implemented as appropriate)   06/12/18 8684   Coping/Psychosocial   Plan of Care Reviewed With patient;spouse   OTHER   Outcome Summary Pt used one breakthrough dose of dilaudid for pain relief. Changed his Port need le and dressing. Tried to empty his pigtail, but no fluid came out. Dr. Manley was called and radiology will see him in the morning to evaluate.   Plan of Care Review   Progress no change     Goal: Individualization and Mutuality  Outcome: Ongoing (interventions implemented as appropriate)    Goal: Discharge Needs Assessment  Outcome: Ongoing (interventions implemented as appropriate)      Problem: Palliative Care (Adult)  Goal: Maximized Comfort  Outcome: Ongoing (interventions implemented as appropriate)    Goal: Enhanced Quality of Life  Outcome: Ongoing (interventions implemented as appropriate)      Problem: Fall Risk (Adult)  Goal: Absence of Fall  Outcome: Ongoing (interventions implemented as appropriate)      Problem: Skin Injury Risk (Adult)  Goal: Skin Health and Integrity  Outcome: Ongoing (interventions implemented as appropriate)

## 2018-06-12 NOTE — PROGRESS NOTES
LOS: 6 days   Primary Care Physician: Fran Manley MD     Subjective   Increased abdominal girth.  Pain is controlled    Vital Signs  There is no height or weight on file to calculate BMI.  Temp:  [96.8 °F (36 °C)-97.7 °F (36.5 °C)] 97.7 °F (36.5 °C)  Heart Rate:  [72-80] 80  Resp:  [16] 16  BP: ()/(59-62) 90/59      Objective:  General Appearance:  In no acute distress and ill-appearing (Looks older than age).    Vital signs: (most recent): Blood pressure 90/59, pulse 80, temperature 97.7 °F (36.5 °C), temperature source Oral, resp. rate 16, SpO2 91 %.    Lungs:  He is not in respiratory distress.  No stridor.  There are decreased breath sounds.  No rales, wheezes or rhonchi.    Heart: Normal rate.  Regular rhythm.  Positive for murmur.    Abdomen: Abdomen is soft and distended.  There are signs of ascites. Bowel sounds are normal.   There is no abdominal tenderness.   There is no splenomegaly. There is no hepatomegaly.   Extremities: There is no dependent edema.    Neurological: Patient is alert.            Assessment & Plan      Medication Review: Yes    Active Hospital Problems (** Indicates Principal Problem)    Diagnosis Date Noted   • **Metastasis from pancreatic cancer [C79.9, C25.9] 06/07/2018   • Admission for hospice care [Z51.5] 06/07/2018   • Pancreatic cancer [C25.9] 05/30/2018   • Ascites [R18.8] 05/15/2018   • Hydronephrosis with urinary obstruction due to renal calculus [N13.2] 10/31/2017   • Bone metastasis [C79.51] 04/11/2016      Resolved Hospital Problems    Diagnosis Date Noted Date Resolved   No resolved problems to display.       Assessment/Plan  1.  Metastatic pancreatic cancer.  Continue comfort measures.  Discussed with nurse.  Vacutainer will be obtained to remove ascites    Yaneth Dumont MD  06/12/18  4:30 PM

## 2018-06-12 NOTE — PROGRESS NOTES
"[facility] Visit Report    Bk Guerra  [id number]  6/12/2018    Admission R/T [facility] Dx: yes    Reason for [facility] Admission: pancreatic ca    Symptom  Management: Pain Control    Nursing/Medication Recommendations:    Psychosocial Issues and Recommendations:    Spiritual Concerns and Recommendations:    [facility] Discharge Plans:  incomplete; patient remains [facility] and [facility] will round daily    Review of Visit (Include All Collaboration- including names of hospital and family involved during admission/visit):  [facility] RN not initially available, per EPIC pt is receiving sched PO Roxicodone 30 mg q4 and has also received 1 PRN dose of same; in addition since midnight pt has received IV Dilaudid 1 mg x2, PO Xanax x1, PO Predisone (sched); pt awaiting another dose of Roxicodone (temp unavailable); pt also has internal pain pump providing doses of fentanyl and bupivacaine.  VS: T97.7, P80, R16, BP90/59, O2=91%@RA;    Pt spouse Nichole at bedside with her , pt AO with drowsiness, doing \"pretty well\" today, awaiting Roxicodone tablet temp unavailable, pt and spouse apprec of the care they are receiving, [facility] provided supportive active listening as pt and spouse shared some dx hx and discussed kidney stones, later also sharing their arya and reliance on God.  [facility] closed visit with assurance of prayer, family appreciative.    Oncology MD charted earlier today:  \"The patient is on Flomax but does not need it now with Petersen catheter in place.    PLAN:  1.  Discontinue Flomax.   2.  Drain ascites today using Vacutainer bottle.\"    [facility] to round daily to assess and offer support.        Agusto Leone, [facility]    "

## 2018-06-13 NOTE — PLAN OF CARE
Problem: Patient Care Overview  Goal: Plan of Care Review  Outcome: Ongoing (interventions implemented as appropriate)   06/13/18 1949   Coping/Psychosocial   Plan of Care Reviewed With patient;spouse   OTHER   Outcome Summary Discussed goals of care with patient and wife, and patient requests PO Pain medication q4hr scheduled to relieve his chronic back and pelvic pain. Radiology evaluated and replaced pigtail drain for bedside paracentesis, after removing 5 L. Drain put to bedside suction and continues to drain copiously. By the day's end, patient seems more lethargic and less alert . Wife at bedside. Will continue to assess for ongoing symptom management needs   Plan of Care Review   Progress declining     Goal: Individualization and Mutuality  Outcome: Ongoing (interventions implemented as appropriate)    Goal: Discharge Needs Assessment  Outcome: Ongoing (interventions implemented as appropriate)    Goal: Interprofessional Rounds/Family Conf  Outcome: Ongoing (interventions implemented as appropriate)      Problem: Palliative Care (Adult)  Goal: Maximized Comfort  Outcome: Ongoing (interventions implemented as appropriate)      Problem: Fall Risk (Adult)  Goal: Absence of Fall  Outcome: Ongoing (interventions implemented as appropriate)      Problem: Skin Injury Risk (Adult)  Goal: Skin Health and Integrity  Outcome: Ongoing (interventions implemented as appropriate)

## 2018-06-13 NOTE — PLAN OF CARE
Problem: Patient Care Overview  Goal: Plan of Care Review  Outcome: Ongoing (interventions implemented as appropriate)   06/13/18 0555   Coping/Psychosocial   Plan of Care Reviewed With patient   OTHER   Outcome Summary Slept well. Wife at BS. Oxycodone q4, no breakthrough pain reported. Will continue palliative measures   Plan of Care Review   Progress no change       Problem: Palliative Care (Adult)  Goal: Maximized Comfort  Outcome: Ongoing (interventions implemented as appropriate)    Goal: Enhanced Quality of Life  Outcome: Ongoing (interventions implemented as appropriate)      Problem: Fall Risk (Adult)  Goal: Absence of Fall  Outcome: Ongoing (interventions implemented as appropriate)      Problem: Skin Injury Risk (Adult)  Goal: Skin Health and Integrity  Outcome: Ongoing (interventions implemented as appropriate)

## 2018-06-13 NOTE — PROGRESS NOTES
LOS: 7 days   Primary Care Physician: Fran Manley MD     Subjective   Sleeping. He didn't wake up when I came in.  Oxycodone wears off during the night.  Here reported to the nurse he would like to take it around the clock.    Vital Signs  There is no height or weight on file to calculate BMI.  Temp:  [96.7 °F (35.9 °C)-97.4 °F (36.3 °C)] 97.4 °F (36.3 °C)  Heart Rate:  [69-82] 82  Resp:  [16-20] 16  BP: ()/(57-64) 96/62      Objective:  General Appearance:  In no acute distress (Chronically ill-appearing).    Vital signs: (most recent): Blood pressure 96/62, pulse 82, temperature 97.4 °F (36.3 °C), temperature source Oral, resp. rate 16, SpO2 94 %.    Abdomen: Abdomen is distended.  (Decreased distention compared to yesterday).    Extremities: There is no dependent edema.          Results Review:    I reviewed the patient's new clinical results.              Results from last 7 days  Lab Units 06/07/18  1334   INR  1.33*     Hemoglobin A1C:  Lab Results   Component Value Date    HGBA1C 6.2 (H) 03/27/2015       Glucose Range:No results found for: POCGLU    Lab Results   Component Value Date    [id number] 417 06/13/2017       Lab Results   Component Value Date    TSH 3.38 03/31/2015       Assessment & Plan      Medication Review: Yes    Active Hospital Problems (** Indicates Principal Problem)    Diagnosis Date Noted   • **Metastasis from pancreatic cancer [C79.9, C25.9] 06/07/2018   • Admission for hospice care [Z51.5] 06/07/2018   • Pancreatic cancer [C25.9] 05/30/2018   • Ascites [R18.8] 05/15/2018   • Hydronephrosis with urinary obstruction due to renal calculus [N13.2] 10/31/2017   • Bone metastasis [C79.51] 04/11/2016      Resolved Hospital Problems    Diagnosis Date Noted Date Resolved   No resolved problems to display.       Assessment/Plan  1.  Metastatic pancreatic cancer with symptomatic ascites buildup.  Now status post drainage placement requiring frequent drainage from catheter.  Will  place drainage bag.  Change oxycodone to every 4 hours scheduled.  Discussed with nurse.  2.  Palliative care.  I renewed several medications that were expiring.  Continue comfort measures    Yaneth Dumont MD  06/13/18  3:45 PM
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
## 2018-06-14 NOTE — PLAN OF CARE
Problem: Patient Care Overview  Goal: Plan of Care Review  Outcome: Ongoing (interventions implemented as appropriate)   06/14/18 1313   Coping/Psychosocial   Plan of Care Reviewed With patient;spouse   OTHER   Outcome Summary Pt AOx4, calm, quiet, cooperative. SCOTT's, states he can turn himself in bed. VSS, afebrile this shift. Receiving scheduled and PRN pain medication, see MAR and flowsheets. Pigtail drain to lower left abdomen draining small amount of yellow fluid to glass self suction container. Pt on Reg diet, no issues reported. Petersen intact draining adequate amounts of urine. Wife remains at his side, friends visiting. Currently resting quietly, denies needs at this time.    Plan of Care Review   Progress declining

## 2018-06-14 NOTE — PROGRESS NOTES
"     LOS: 8 days   Primary Care Physician: Fran Manley MD     Subjective   Has had a lot of drainage from pigtail catheter and Vacutainer until around midmorning when it seems to have slowed.  Right hand has been weak.  He has some numbness in the hand as well.  This is been going on for days if not weeks.    Vital Signs  Body mass index is 24.64 kg/m².  Temp:  [96.8 °F (36 °C)-97.1 °F (36.2 °C)] 96.8 °F (36 °C)  Heart Rate:  [76-79] 78  Resp:  [16] 16  BP: ()/(65-68) 99/65      Objective:  General Appearance:  In no acute distress.    Vital signs: (most recent): Blood pressure 99/65, pulse 78, temperature 96.8 °F (36 °C), temperature source Oral, resp. rate 16, height 182.9 cm (72.01\"), weight 82.4 kg (181 lb 11.1 oz), SpO2 92 %.    Lungs:  He is not in respiratory distress.  No stridor.  There are decreased breath sounds.  No rales, wheezes or rhonchi.    Heart: Normal rate.  Regular rhythm.  Positive for murmur.  (Grade 3/6 systolic murmur left sternal border)  Abdomen: Abdomen is soft and distended.  There are signs of ascites. Bowel sounds are normal.   There is no abdominal tenderness.   There is no splenomegaly. There is no hepatomegaly.   Extremities: There is no dependent edema.  (Interosseous wasting)  Skin:  Warm and dry.          Results Review:    I reviewed the patient's new clinical results.                Hemoglobin A1C:  Lab Results   Component Value Date    HGBA1C 6.2 (H) 03/27/2015       Glucose Range:No results found for: POCGLU    Lab Results   Component Value Date    [id number] 417 06/13/2017       Lab Results   Component Value Date    TSH 3.38 03/31/2015       Assessment & Plan      Medication Review: Yes    Active Hospital Problems (** Indicates Principal Problem)    Diagnosis Date Noted   • **Metastasis from pancreatic cancer [C79.9, C25.9] 06/07/2018   • Admission for hospice care [Z51.5] 06/07/2018   • Pancreatic cancer [C25.9] 05/30/2018   • Ascites [R18.8] 05/15/2018   • " Hydronephrosis with urinary obstruction due to renal calculus [N13.2] 10/31/2017   • Bone metastasis [C79.51] 04/11/2016      Resolved Hospital Problems    Diagnosis Date Noted Date Resolved   No resolved problems to display.       Assessment/Plan  1.  Metastatic pancreatic cancer.  He has pigtail catheter at the left lower quadrant which can be drained intermittently for comfort.  Continue pain medications.  Patient is on a hospice scattered bed status  2.  Weakness and paresthesias right hand.  I've discussed this multiple times with the patient and his wife.  This could be secondary to degenerative disc disease in the neck versus tumor effect.  I ordered one dose of IV steroids to see if he gets benefit from that.    Yaneth Dumont MD  06/14/18  4:06 PM

## 2018-06-14 NOTE — PROGRESS NOTES
[facility] Visit Report    Bk Guerra  [id number]  6/14/2018    Admission R/T [facility] Dx: yes    Reason for [facility] Admission:  Pancreatic Ca w/ liver maribel    Symptom  Management: Pain Control    Nursing/Medication Recommendations: No recommendations at this time.    Psychosocial Issues and Recommendations:  Continue to offer/provide support to patient and family.    Spiritual Concerns and Recommendations:    [facility] Discharge Plans:  incomplete; patient remains HSB and [facility] will round daily.   Pt continues to require IV meds to manage pain.  Plan is to return home if pain can be managed.  Will continue to monitor for decline and symptom management needs.    Review of Visit (Include All Collaboration- including names of hospital and family involved during admission/visit):   Collaborated with [facility] staff, no issues or concerns reported and no plans for discharge at this time.  Per MD note, pt with increased drainage from pigtail cath this morning.  Abd distention/ ascites.  Also states pt c/o weakness and numbness in right hand.  One time dose of IV Solumedrol was given.  Pt is taking PO Oxycodone IR 30mg scheduled q4 hrs.  In the last 24 hrs he has had prn doses of Oxycodone IR 30mg x1, Xanax x1, and IV Dilaudid 1mg x5.  Pt is alert and denies pain or discomfort at this time.  Reports no relief from numbness in right hand after steroid but states RN just administered not long before this visit.  Pt's wife is present at bedside, they deny any questions or concerns at this time.  Provided support.  Will continue to visit daily to assess needs, offer support, and facilitate discharge as appropriate.        Louisa Santiago RN
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
## 2018-06-14 NOTE — PLAN OF CARE
Problem: Patient Care Overview  Goal: Plan of Care Review  Outcome: Ongoing (interventions implemented as appropriate)   06/14/18 0557   Coping/Psychosocial   Plan of Care Reviewed With patient   OTHER   Outcome Summary patient continues to recieve scheduled po pain medication and intermittent iv pain medication for pain control. pigtail continued to drain a large amount at the beginning of the shift then slowed down throughout the shift. patient had increase in energy after nights sleep and was able to drink an ensure. will continue to monitor and provide comfort care.   Plan of Care Review   Progress declining       Problem: Palliative Care (Adult)  Goal: Maximized Comfort  Outcome: Ongoing (interventions implemented as appropriate)    Goal: Enhanced Quality of Life  Outcome: Ongoing (interventions implemented as appropriate)      Problem: Fall Risk (Adult)  Goal: Absence of Fall  Outcome: Ongoing (interventions implemented as appropriate)      Problem: Skin Injury Risk (Adult)  Goal: Skin Health and Integrity  Outcome: Ongoing (interventions implemented as appropriate)

## 2018-06-15 NOTE — PROGRESS NOTES
" LOS: 9 days     Name: Bk Guerra  Age: 67 y.o.  Sex: male  :  1950  MRN: [id number]         Primary Care Physician: Fran Manley MD    Subjective   Subjective  Feeling constipated.  No bowel movement in 4 days.  Pain is better controlled after the oxycodone was changed to scheduled regimen.    Objective   Vital Signs  Temp:  [96.9 °F (36.1 °C)-97.3 °F (36.3 °C)] 96.9 °F (36.1 °C)  Heart Rate:  [72-80] 80  Resp:  [16] 16  BP: (103-105)/(64-67) 105/64  Body mass index is 24.64 kg/m².    Objective:  General Appearance:  Comfortable, in no acute distress and ill-appearing.    Vital signs: (most recent): Blood pressure 105/64, pulse 80, temperature 96.9 °F (36.1 °C), temperature source Oral, resp. rate 16, height 182.9 cm (72.01\"), weight 82.4 kg (181 lb 11.1 oz), SpO2 93 %.    Lungs:  Normal effort and normal respiratory rate.    Heart: Normal rate.  Regular rhythm.    Abdomen: Abdomen is soft.  (Abdominal pigtail catheter in place that is draining to a vacuum container).  Bowel sounds are normal.   There is no abdominal tenderness.     Extremities: There is no dependent edema or local swelling.    Neurological: Patient is alert and oriented to person, place and time.    Skin:  Warm and dry.              Results Review:       I reviewed the patient's new clinical results.                        Scheduled Meds:     docusate sodium 100 mg Oral BID   doxepin 25 mg Oral Nightly   famotidine 20 mg Oral QAM   gabapentin 200 mg Oral Q12H   multivitamin 1 tablet Oral Daily   oxyCODONE 30 mg Oral Q4H   polyethylene glycol 17 g Oral Daily   predniSONE 20 mg Oral Daily     PRN Meds:   •  acetaminophen **OR** acetaminophen **OR** acetaminophen  •  acetaminophen  •  ALPRAZolam  •  bisacodyl  •  diphenoxylate-atropine  •  Glycerin-Hypromellose-  •  glycopyrrolate **OR** glycopyrrolate **OR** glycopyrrolate **OR** glycopyrrolate  •  HYDROmorphone  •  HYDROmorphone  •  LORazepam **OR** LORazepam  •  LORazepam " **OR** LORazepam  •  LORazepam **OR** LORazepam  •  magic mouthwash  •  ondansetron  •  oxyCODONE  •  promethazine **OR** promethazine **OR** promethazine **OR** promethazine  •  sodium chloride  Continuous Infusions:    lactated ringers 9 mL/hr       Assessment/Plan   Principal Problem:    Metastasis from pancreatic cancer  Active Problems:    Bone metastasis    Hydronephrosis with urinary obstruction due to renal calculus    Ascites    Pancreatic cancer    Admission for hospice care      Assessment & Plan    - Continue comfort measures.  The patient is in a hospice scattered bed.  - I will add Colace and as needed bisacodyl suppository to his regimen given complaints of constipation today.  - Current pain medication regimen is appropriately controlling his pain.  Continue same for now.    Yariel Suazo MD  [facility]  06/15/18  10:49 AM

## 2018-06-15 NOTE — PLAN OF CARE
Problem: Patient Care Overview  Goal: Plan of Care Review  Continue system management and comfort care.   06/15/18 3394   OTHER   Outcome Summary Patient rested well overnight. Pain medication given Q4 hours as scheduled. Family at bedside. Pt complains of weakness in right hand. Needs assistance with liquids and medications.

## 2018-06-15 NOTE — PLAN OF CARE
Problem: Patient Care Overview  Goal: Plan of Care Review  Outcome: Ongoing (interventions implemented as appropriate)   06/1950   Coping/Psychosocial   Plan of Care Reviewed With patient;spouse   OTHER   Outcome Summary Patient rested a lot today. Minimal pain. working on having a bm...colace ordered and given. suppository given toward end of shift. pending results.    Plan of Care Review   Progress declining     Goal: Individualization and Mutuality  Outcome: Ongoing (interventions implemented as appropriate)    Goal: Discharge Needs Assessment  Outcome: Ongoing (interventions implemented as appropriate)    Goal: Interprofessional Rounds/Family Conf  Outcome: Ongoing (interventions implemented as appropriate)      Problem: Palliative Care (Adult)  Goal: Maximized Comfort  Outcome: Ongoing (interventions implemented as appropriate)    Goal: Enhanced Quality of Life  Outcome: Ongoing (interventions implemented as appropriate)      Problem: Fall Risk (Adult)  Goal: Absence of Fall  Outcome: Ongoing (interventions implemented as appropriate)      Problem: Skin Injury Risk (Adult)  Goal: Skin Health and Integrity  Outcome: Ongoing (interventions implemented as appropriate)

## 2018-06-15 NOTE — PROGRESS NOTES
"[facility] Visit Report    Bk Guerra  [id number]  6/15/2018    Admission R/T [facility] Dx: yes    Reason for [facility] Admission: pancreatic ca    Symptom  Management: Pain Control    Nursing/Medication Recommendations:    Psychosocial Issues and Recommendations:    Spiritual Concerns and Recommendations:    [facility] Discharge Plans:  incomplete; patient remains HSB and [facility] will round daily    Review of Visit (Include All Collaboration- including names of hospital and family involved during admission/visit):  P collab with [facility] OLIVIA Mendoza, who indicated pt had been sleeping more today; pt cont to receive sched PO Oxycodone 30 mg q4, also receiving sched PO Prednisone, per EPIC pt received PRN IV Dilaudid 1 mg x4 yesterday, but has not required any today, but MD added \"Colace and as needed bisacodyl suppository to his regimen given complaints of constipation today\"; VS: T96.9, P80, R16, /64, O2=93%RA;    Pt's spouse Nichole, pt's sister and brother-in-law present with pt, pt somewhat lethargic, felt pain was being managed a little better today but has been dealing with constipation; he had just notified staff he may need a bedpan, pt had no other concerns, spouse indicated she was fine, [facility] closed visit.    [facility] to round daily to assess and offer support.      Agusto Leone, [facility]    "

## 2018-06-16 NOTE — PLAN OF CARE
Problem: Patient Care Overview  Goal: Plan of Care Review  Outcome: Ongoing (interventions implemented as appropriate)   06/16/18 0541   Coping/Psychosocial   Plan of Care Reviewed With patient;spouse   OTHER   Outcome Summary Patient rested well, pain seems well controlled with oxycodone scheduled q 4 hr. Wife at bedside. Discussed goals and plan of care, and both patient and wife are satisfied with care. Pleurx drain bumb changed x2 for copious output Will continue to assess for ongoing needs   Plan of Care Review   Progress no change     Goal: Individualization and Mutuality  Outcome: Ongoing (interventions implemented as appropriate)    Goal: Discharge Needs Assessment  Outcome: Ongoing (interventions implemented as appropriate)      Problem: Palliative Care (Adult)  Goal: Maximized Comfort  Outcome: Ongoing (interventions implemented as appropriate)    Goal: Enhanced Quality of Life  Outcome: Ongoing (interventions implemented as appropriate)      Problem: Fall Risk (Adult)  Goal: Absence of Fall  Outcome: Ongoing (interventions implemented as appropriate)      Problem: Skin Injury Risk (Adult)  Goal: Skin Health and Integrity  Outcome: Ongoing (interventions implemented as appropriate)

## 2018-06-16 NOTE — CONSULTS
visited with wife. She was very clearly upset and had been crying. When asked, she said she was fine and thanked me for stopping by. If possible, the  will follow up next week. Please page the  at the request of the family

## 2018-06-16 NOTE — PROGRESS NOTES
" LOS: 10 days     Name: Bk Guerra  Age: 67 y.o.  Sex: male  :  1950  MRN: [id number]         Primary Care Physician: Fran Manley MD    Subjective   Subjective  Had a bowel movement yesterday evening.  No new complaints today.    Objective   Vital Signs  Temp:  [96.9 °F (36.1 °C)-97 °F (36.1 °C)] 96.9 °F (36.1 °C)  Heart Rate:  [77-82] 82  Resp:  [16] 16  BP: (106-110)/(60-68) 110/68  Body mass index is 24.64 kg/m².    Objective:  General Appearance:  Comfortable, in no acute distress and ill-appearing.    Vital signs: (most recent): Blood pressure 110/68, pulse 82, temperature 96.9 °F (36.1 °C), temperature source Oral, resp. rate 16, height 182.9 cm (72.01\"), weight 82.4 kg (181 lb 11.1 oz), SpO2 91 %.    Lungs:  Normal effort and normal respiratory rate.    Heart: Normal rate.  Regular rhythm.    Abdomen: Abdomen is soft.  (Pigtail catheter in place in the left lower quadrant).  Bowel sounds are normal.   There is no abdominal tenderness.     Extremities: There is no dependent edema or local swelling.    Neurological: Patient is alert and oriented to person, place and time.    Skin:  Warm and dry.              Results Review:       I reviewed the patient's new clinical results.                        Scheduled Meds:     docusate sodium 100 mg Oral BID   doxepin 25 mg Oral Nightly   famotidine 20 mg Oral QAM   gabapentin 200 mg Oral Q12H   multivitamin 1 tablet Oral Daily   oxyCODONE 30 mg Oral Q4H   polyethylene glycol 17 g Oral Daily   predniSONE 20 mg Oral Daily     PRN Meds:   •  acetaminophen **OR** acetaminophen **OR** acetaminophen  •  acetaminophen  •  ALPRAZolam  •  bisacodyl  •  diphenoxylate-atropine  •  Glycerin-Hypromellose-  •  glycopyrrolate **OR** glycopyrrolate **OR** glycopyrrolate **OR** glycopyrrolate  •  HYDROmorphone  •  HYDROmorphone  •  LORazepam **OR** LORazepam  •  LORazepam **OR** LORazepam  •  LORazepam **OR** LORazepam  •  magic mouthwash  •  ondansetron  •  " oxyCODONE  •  promethazine **OR** promethazine **OR** promethazine **OR** promethazine  •  sodium chloride  Continuous Infusions:    lactated ringers 9 mL/hr       Assessment/Plan   Principal Problem:    Metastasis from pancreatic cancer  Active Problems:    Bone metastasis    Hydronephrosis with urinary obstruction due to renal calculus    Ascites    Pancreatic cancer    Admission for hospice care      Assessment & Plan    - Continue comfort measures.  The patient is in a hospice scattered bed.  - Continue current bowel regimen.  Positive bowel movement yesterday evening  - Continue vacuum container for drainage of ascitic fluid.  Drainage has ceased.  No increasing abdominal girth or pain.  - Current pain medication regimen is appropriately controlling his pain.  Continue same for now.    Yariel Suazo MD  [facility]  06/16/18  9:04 AM

## 2018-06-16 NOTE — PROGRESS NOTES
[facility] Visit Report    Bk Guerra  [id number]  6/16/2018    Admission R/T [facility] Dx: yes    Reason for [facility] Admission:pancreatic CA with mets    Symptom  Management: pain control    Nursing/Medication Recommendations:Continue symptom management of pain with current regimen; IV PRN medications are available if indicated. Comfort care for declining patient    Psychosocial Issues and Recommendations:    Spiritual Concerns and Recommendations:    [facility] Discharge Plans:No dishcarge plans at this time. Patient continues to meet criteria as a [facility] scattered bed patient. Patient continues with increased needs for frequent ascites drainage      Review of Visi:Collaborated with facility [facility]; patient more lethargic today with decrease in po intake per nurse report. Visited patient in room P492 he awakens easily to verbal stimulation. Patient denies any pain or SOA currently. Patient did tell me that LLQ pigtail has been connected to vacuum drain today and has had 5L removed. Patient states abdomen feels much better. Patient also told me that he had a bowel movement yesterday and that also helped relieve some pressure from abdomen. Patient falls asleep during assessment. Patient is on RA; lung sounds are diminished throughout all lobes. F/C noted with small amount of blood tinged urine to bedside drain. In the last 24 hours patient has received Roxicodone IR 30mg po q4h scheduled and Xanax 0.5mg po x1 dose. Will continue to visit daily, assess needs of patient/family and provide support        Leslye Choi RN

## 2018-06-16 NOTE — PLAN OF CARE
Problem: Patient Care Overview  Goal: Plan of Care Review  Outcome: Ongoing (interventions implemented as appropriate)   06/16/18 0603   Coping/Psychosocial   Plan of Care Reviewed With patient   OTHER   Outcome Summary Pt rested well during the night, medicated with PO pain meds q4 and IV pain med x1, wife remained at bedside during night, pt did have 2 large bm's, will cont. to monitor and provide comfort care   Plan of Care Review   Progress no change       Problem: Palliative Care (Adult)  Goal: Maximized Comfort  Outcome: Ongoing (interventions implemented as appropriate)    Goal: Enhanced Quality of Life  Outcome: Ongoing (interventions implemented as appropriate)      Problem: Fall Risk (Adult)  Goal: Absence of Fall  Outcome: Ongoing (interventions implemented as appropriate)      Problem: Skin Injury Risk (Adult)  Goal: Skin Health and Integrity  Outcome: Ongoing (interventions implemented as appropriate)

## 2018-06-17 NOTE — PLAN OF CARE
Problem: Patient Care Overview  Goal: Plan of Care Review  Outcome: Ongoing (interventions implemented as appropriate)   06/17/18 0207   Coping/Psychosocial   Plan of Care Reviewed With patient;spouse   OTHER   Outcome Summary Pt states feeling very lethargic tonight (had company stay quite awhile), pain controlled w schedule Oxycodone, pleurx drain bulb about half full on this shift, F/C in place w bloody output, wife @ bedside, will continue to monitor   Plan of Care Review   Progress no change

## 2018-06-17 NOTE — PLAN OF CARE
Problem: Patient Care Overview  Goal: Plan of Care Review  Outcome: Ongoing (interventions implemented as appropriate)   06/17/18 1958   Coping/Psychosocial   Plan of Care Reviewed With patient;spouse   Plan of Care Review   Progress no change     Goal: Individualization and Mutuality  Outcome: Ongoing (interventions implemented as appropriate)      Problem: Palliative Care (Adult)  Goal: Maximized Comfort  Outcome: Ongoing (interventions implemented as appropriate)    Goal: Enhanced Quality of Life  Outcome: Ongoing (interventions implemented as appropriate)      Problem: Fall Risk (Adult)  Goal: Absence of Fall  Outcome: Ongoing (interventions implemented as appropriate)      Problem: Skin Injury Risk (Adult)  Goal: Skin Health and Integrity  Outcome: Ongoing (interventions implemented as appropriate)

## 2018-06-17 NOTE — PROGRESS NOTES
" LOS: 11 days     Name: Bk Guerra  Age: 67 y.o.  Sex: male  :  1950  MRN: [id number]         Primary Care Physician: Fran Manley MD    Subjective   Subjective  No complaints at this time.  Denies abdominal pain.  Continues to drain ascitic fluid into vacuum container.    Objective   Vital Signs  Temp:  [97.4 °F (36.3 °C)] 97.4 °F (36.3 °C)  Heart Rate:  [68-85] 85  Resp:  [14-16] 14  BP: (100-102)/(62-64) 100/62  Body mass index is 24.64 kg/m².    Objective:  General Appearance:  Comfortable, in no acute distress and ill-appearing (Terminally ill-appearing).    Vital signs: (most recent): Blood pressure 100/62, pulse 85, temperature 97.4 °F (36.3 °C), temperature source Oral, resp. rate 14, height 182.9 cm (72.01\"), weight 82.4 kg (181 lb 11.1 oz), SpO2 92 %.    Lungs:  Normal effort and normal respiratory rate.    Heart: Normal rate.  Regular rhythm.    Abdomen: Abdomen is soft.  (Pigtail catheter in place in the left lower quadrant.  Abdomen is soft and nontender.  Appears less distended than yesterday.).  Bowel sounds are normal.   There is no abdominal tenderness.     Extremities: There is no dependent edema or local swelling.    Neurological: Patient is alert and oriented to person, place and time.    Skin:  Warm and dry.              Results Review:       I reviewed the patient's new clinical results.                        Scheduled Meds:     docusate sodium 100 mg Oral BID   doxepin 25 mg Oral Nightly   famotidine 20 mg Oral QAM   gabapentin 200 mg Oral Q12H   multivitamin 1 tablet Oral Daily   oxyCODONE 30 mg Oral Q4H   polyethylene glycol 17 g Oral Daily   predniSONE 20 mg Oral Daily     PRN Meds:   •  acetaminophen **OR** acetaminophen **OR** acetaminophen  •  acetaminophen  •  ALPRAZolam  •  bisacodyl  •  diphenoxylate-atropine  •  Glycerin-Hypromellose-  •  glycopyrrolate **OR** glycopyrrolate **OR** glycopyrrolate **OR** glycopyrrolate  •  HYDROmorphone  •  HYDROmorphone  •  " LORazepam **OR** LORazepam  •  LORazepam **OR** LORazepam  •  LORazepam **OR** LORazepam  •  magic mouthwash  •  ondansetron  •  oxyCODONE  •  promethazine **OR** promethazine **OR** promethazine **OR** promethazine  •  sodium chloride  Continuous Infusions:    lactated ringers 9 mL/hr       Assessment/Plan   Principal Problem:    Metastasis from pancreatic cancer  Active Problems:    Bone metastasis    Hydronephrosis with urinary obstruction due to renal calculus    Ascites    Pancreatic cancer    Admission for hospice care      Assessment & Plan    - Continue comfort measures.  The patient is in a hospice scattered bed.  - Continue current bowel regimen.  - Continue vacuum container for drainage of ascitic fluid.   he had additional drainage yesterday afternoon and overnight requiring changing of vacuum container twice.  Abdomen is soft and nontender, less distended than yesterday.  - Current pain medication regimen is appropriately controlling his pain.  Continue same for now.    Yariel Suazo MD  [facility]  06/17/18  9:17 AM

## 2018-06-18 NOTE — PLAN OF CARE
Problem: Patient Care Overview  Goal: Plan of Care Review  Outcome: Ongoing (interventions implemented as appropriate)   06/18/18 0508   Coping/Psychosocial   Plan of Care Reviewed With patient   OTHER   Outcome Summary Lethargic at beginning of the shift & pregressively getting worse, foot cold, lg BM, F/C & pleurx bulb in place w output, wife @ bedside   Plan of Care Review   Progress declining

## 2018-06-18 NOTE — PROGRESS NOTES
[facility] Visit Report    Bk Guerra  [id number]  6/17/2018    Admission R/T [facility] Dx: YES      Reason for [facility] Admission: Pancreatic CA with liver mets, malignant ascites      Symptom  Management: Pain control      Nursing/Medication Recommendations: No recommendations at this time      Psychosocial Issues and Recommendations: Provide support to patient and family      Spiritual Concerns and Recommendations: None at present      [facility] Discharge Plans:  None at present, continue to monitor for decline and manage symptoms of pain using IV and oral pain medication. Draining Pleurx as needed. Patient weaker and eating less.      Review of Visit: Close monitoring for safety/falls, symptom management of pain, comfort care for declining patient. Patient lying in bed, color slightly ashen. Spoke to patient and family and emotional support provided. Patient appears weaker and eating less. Discussed care needs with staff and patient receiving oral and IV pain medication. Will see patient daily to assess needs, monitor status and offer support.        Katelynn Silva RN  [facility]

## 2018-06-18 NOTE — PLAN OF CARE
Problem: Patient Care Overview  Goal: Plan of Care Review  Outcome: Ongoing (interventions implemented as appropriate)   06/18/18 4799   Coping/Psychosocial   Plan of Care Reviewed With patient   OTHER   Outcome Summary pt given 2mg dilaudid multiple times today. family want him premedicated for turns. pt turned q 4. educated family on palliative care. continue to monitor   Plan of Care Review   Progress declining     Goal: Individualization and Mutuality  Outcome: Ongoing (interventions implemented as appropriate)      Problem: Palliative Care (Adult)  Goal: Maximized Comfort  Outcome: Ongoing (interventions implemented as appropriate)    Goal: Enhanced Quality of Life  Outcome: Ongoing (interventions implemented as appropriate)      Problem: Fall Risk (Adult)  Goal: Absence of Fall  Outcome: Ongoing (interventions implemented as appropriate)      Problem: Skin Injury Risk (Adult)  Goal: Skin Health and Integrity  Outcome: Ongoing (interventions implemented as appropriate)

## 2018-06-18 NOTE — PROGRESS NOTES
" LOS: 12 days     Name: Bk Guerra  Age: 67 y.o.  Sex: male  :  1950  MRN: [id number]         Primary Care Physician: Fran Manley MD    Subjective   Subjective  Took a turn for the worse last night.  Has significant change in mental status.  Much more confused and somnolent today.  Unable to take oral medications.  He overall looks worse.    Objective   Vital Signs  Temp:  [97.5 °F (36.4 °C)] 97.5 °F (36.4 °C)  Heart Rate:  [] 125  Resp:  [12-20] 20  BP: ()/(62-64) 103/64  Body mass index is 24.64 kg/m².    Objective:  General Appearance:  Comfortable, in no acute distress and ill-appearing (Cachectic appearing).    Vital signs: (most recent): Blood pressure 103/64, pulse (!) 125, temperature 97.5 °F (36.4 °C), temperature source Oral, resp. rate 20, height 182.9 cm (72.01\"), weight 82.4 kg (181 lb 11.1 oz), SpO2 92 %.    Lungs:  Tachypnea and increased effort.    Heart: Tachycardia.  Regular rhythm.    Abdomen: Abdomen is soft.  Bowel sounds are normal.   There is no abdominal tenderness.     Extremities: There is no dependent edema or local swelling.    Neurological: (Confused and delirious appearing).    Skin:  Warm and dry.              Results Review:       I reviewed the patient's new clinical results.                  Scheduled Meds:     docusate sodium 100 mg Oral BID   doxepin 25 mg Oral Nightly   famotidine 20 mg Oral QAM   gabapentin 200 mg Oral Q12H   multivitamin 1 tablet Oral Daily   oxyCODONE 30 mg Oral Q4H   polyethylene glycol 17 g Oral Daily   predniSONE 20 mg Oral Daily     PRN Meds:   •  acetaminophen **OR** acetaminophen **OR** acetaminophen  •  acetaminophen  •  ALPRAZolam  •  bisacodyl  •  diphenoxylate-atropine  •  Glycerin-Hypromellose-  •  glycopyrrolate **OR** glycopyrrolate **OR** glycopyrrolate **OR** glycopyrrolate  •  HYDROmorphone  •  HYDROmorphone  •  HYDROmorphone  •  LORazepam **OR** LORazepam  •  LORazepam **OR** LORazepam  •  LORazepam **OR** " LORazepam  •  magic mouthwash  •  ondansetron  •  oxyCODONE  •  promethazine **OR** promethazine **OR** promethazine **OR** promethazine  •  sodium chloride  Continuous Infusions:    lactated ringers 9 mL/hr       Assessment/Plan   Principal Problem:    Metastasis from pancreatic cancer  Active Problems:    Bone metastasis    Hydronephrosis with urinary obstruction due to renal calculus    Ascites    Pancreatic cancer    Admission for hospice care      Assessment & Plan    - Continue comfort measures.  He looks much worse today.  No longer able to take oral medications and we have transitioned to IV Dilaudid.  At present his prognosis is that of hours to days.  - Discussed with family at bedside.    Yariel Suazo MD  [facility]  06/18/18  10:37 AM

## 2018-06-19 NOTE — PROGRESS NOTES
" LOS: 13 days     Name: Bk Guerra  Age: 67 y.o.  Sex: male  :  1950  MRN: [id number]         Primary Care Physician: Fran Manley MD    Subjective   Subjective  Continues to decline.  Requiring increasing doses of clotted.  Not taking anything by mouth.  He appears terminally ill and delirious.    Objective   Vital Signs  Temp:  [96 °F (35.6 °C)] 96 °F (35.6 °C)  Resp:  [18-24] 18  Body mass index is 24.64 kg/m².    Objective:  General Appearance:  Ill-appearing (Terminally ill-appearing).    Vital signs: (most recent): Blood pressure 103/64, pulse (!) 125, temperature 96 °F (35.6 °C), temperature source Oral, resp. rate 18, height 182.9 cm (72.01\"), weight 82.4 kg (181 lb 11.1 oz), SpO2 92 %.    Lungs:  Normal effort and normal respiratory rate.    Heart: Normal rate.  Regular rhythm.    Abdomen: Abdomen is soft.  (Pigtail catheter in place in the left lower quadrant).  Bowel sounds are normal.   There is no abdominal tenderness.     Extremities: There is no dependent edema or local swelling.    Neurological: (Awake but delirious and confused).    Skin:  Warm and dry.              Results Review:       I reviewed the patient's new clinical results.                        Scheduled Meds:      PRN Meds:   •  acetaminophen **OR** acetaminophen **OR** acetaminophen  •  acetaminophen  •  ALPRAZolam  •  bisacodyl  •  diphenoxylate-atropine  •  Glycerin-Hypromellose-  •  glycopyrrolate **OR** glycopyrrolate **OR** glycopyrrolate **OR** glycopyrrolate  •  HYDROmorphone  •  HYDROmorphone  •  HYDROmorphone  •  LORazepam **OR** LORazepam  •  LORazepam **OR** LORazepam  •  LORazepam **OR** LORazepam  •  magic mouthwash  •  ondansetron  •  promethazine **OR** promethazine **OR** promethazine **OR** promethazine  •  sodium chloride  Continuous Infusions:    lactated ringers 9 mL/hr       Assessment/Plan   Principal Problem:    Metastasis from pancreatic cancer  Active Problems:    Bone metastasis    " Hydronephrosis with urinary obstruction due to renal calculus    Ascites    Pancreatic cancer    Admission for hospice care      Assessment & Plan    - Continue comfort measures.  Discussed with his wife at the bedside.  He is requiring increasing doses of IV Dilaudid.  Prognosis is that of hours to days.      Yariel Suazo MD  [facility]  06/19/18  9:10 AM

## 2018-06-19 NOTE — PROGRESS NOTES
I assessed patient's comfort today.  He appears comfortable and current regimen effective.  Patient has shallow breathing, cool/mottled extremities and death is imminent.    Multiple family at bedside, no concerns noted.  Appear to be coping well.    We will continue to visit daily as applicable and are available at any time to address concerns.    Thank you for all you do,  Fiorella Webb LPN  [facility] [phone]

## 2018-06-19 NOTE — PLAN OF CARE
Problem: Patient Care Overview  Goal: Plan of Care Review  Outcome: Ongoing (interventions implemented as appropriate)   06/19/18 1186   Coping/Psychosocial   Plan of Care Reviewed With spouse   OTHER   Outcome Summary Family refusing turns now, giving Dilaudid when requested x2 thus far, discussed palliative care, family staying in sleep room, will continue to monitor   Plan of Care Review   Progress declining

## 2018-06-19 NOTE — PLAN OF CARE
Problem: Patient Care Overview  Goal: Plan of Care Review  Outcome: Ongoing (interventions implemented as appropriate)   06/19/18 5692   Coping/Psychosocial   Plan of Care Reviewed With patient   OTHER   Outcome Summary family refusing turns. pt given dilaudid q 4 hours. pt also given ativan. pt appears comfortable. continue to monitor.   Plan of Care Review   Progress declining       Problem: Palliative Care (Adult)  Goal: Maximized Comfort  Outcome: Ongoing (interventions implemented as appropriate)    Goal: Enhanced Quality of Life  Outcome: Ongoing (interventions implemented as appropriate)      Problem: Fall Risk (Adult)  Goal: Absence of Fall  Outcome: Ongoing (interventions implemented as appropriate)      Problem: Skin Injury Risk (Adult)  Goal: Skin Health and Integrity  Outcome: Ongoing (interventions implemented as appropriate)

## 2018-06-20 NOTE — PROGRESS NOTES
Case Management Discharge Note    Final Note: The patient  on 18 @ 00:30. MITRA Castaneda RN, CCP    Destination - Selection Complete     Service Request Status Selected Specialties Address Phone Number Fax Number    [facility] Selected Hospice [address], [facility] [phone]      Durable Medical Equipment     No service coordination in this encounter.      Dialysis/Infusion     No service coordination in this encounter.      Home Medical Care     No service coordination in this encounter.      Social Care     No service coordination in this encounter.             Final Discharge Disposition Code: 41 -  in medical facility

## 2018-06-21 ENCOUNTER — HOSPITAL ENCOUNTER (OUTPATIENT)
Dept: ULTRASOUND IMAGING | Facility: HOSPITAL | Age: 68
End: 2018-06-21
Attending: INTERNAL MEDICINE

## 2018-06-21 NOTE — PROGRESS NOTES
Case Management Discharge Note    Final Note: The patient  on 18 @ 00:30. MITRA Castaneda RN, CCP    Destination - Selection Complete     Service Request Status Selected Specialties Address Phone Number Fax Number    [facility] Selected Hospice [address], [facility] [phone]      Durable Medical Equipment     No service coordination in this encounter.      Dialysis/Infusion     No service coordination in this encounter.      Home Medical Care     No service coordination in this encounter.      Social Care     No service coordination in this encounter.             Final Discharge Disposition Code: 41 -  in medical facility

## 2018-06-22 ENCOUNTER — RESULTS ENCOUNTER (OUTPATIENT)
Dept: ONCOLOGY | Facility: CLINIC | Age: 68
End: 2018-06-22

## 2018-06-22 DIAGNOSIS — C25.9 PANCREAS CARCINOMA (HCC): ICD-10-CM

## 2018-06-22 DIAGNOSIS — R18.8 OTHER ASCITES: ICD-10-CM

## 2018-06-22 DIAGNOSIS — C78.7 LIVER METASTASES: ICD-10-CM

## 2018-06-25 NOTE — DISCHARGE SUMMARY
Date of Admission: 2018  Date of Death:  2018 @ 00:30  Primary Care Physician: Fran Manley MD     Discharge Diagnosis:  Principal Problem:    Metastasis from pancreatic cancer  Active Problems:    Bone metastasis    Hydronephrosis with urinary obstruction due to renal calculus    Ascites    Pancreatic cancer    Admission for hospice care      DETAILS OF HOSPITAL STAY     Hospital Course    This was a 67-year-old male with metastatic pancreatic cancer with ascites and cirrhosis presented to the [facility] with abdominal pain for 3 days.  He normally gets a paracentesis every 2 weeks with a large volume of fluid removal.  The cancer has not responded to treatment and Dr. Manley has talked to him about palliative care.  However, patient was admitted with acute abdominal pain on found to have bilateral ureteral stones and hydronephrosis.  He was taken to the operating room and underwent lithotripsy and stent placement.  After that he had a large volume paracentesis of 15 L.  Dr. Cristopher Gale also saw the patient and discussed the case with Dr. Manley.  At this time it was appropriate for the patient to be transferred to palliative unit for comfort care as is prognosis is poor.   He was admitted to a hospice scattered bed where he remained for comfort care until the time of his passing.    Consults:   Consults     Date and Time Order Name Status Description    2018 2240 Inpatient Pain Medicine Physician Consult Completed     2018 0030 Hematology & Oncology Inpatient Consult Completed     2018 0953 [facility] (on-call MD unless specified) Completed     2018 0922 Urology (on-call MD unless specified) Completed           Discharge Disposition          I have examined and discussed discharge planning with the patient today.     Yariel Suazo MD  18  2:25 PM    Time: Discharge 15 min

## 2018-06-28 ENCOUNTER — HOSPITAL ENCOUNTER (OUTPATIENT)
Dept: ULTRASOUND IMAGING | Facility: HOSPITAL | Age: 68
End: 2018-06-28
Attending: INTERNAL MEDICINE

## 2018-06-29 ENCOUNTER — RESULTS ENCOUNTER (OUTPATIENT)
Dept: ONCOLOGY | Facility: CLINIC | Age: 68
End: 2018-06-29

## 2018-06-29 DIAGNOSIS — C78.7 LIVER METASTASES: ICD-10-CM

## 2018-06-29 DIAGNOSIS — C25.9 PANCREAS CARCINOMA (HCC): ICD-10-CM

## 2018-07-05 ENCOUNTER — HOSPITAL ENCOUNTER (OUTPATIENT)
Dept: ULTRASOUND IMAGING | Facility: HOSPITAL | Age: 68
End: 2018-07-05
Attending: INTERNAL MEDICINE

## 2018-07-06 ENCOUNTER — RESULTS ENCOUNTER (OUTPATIENT)
Dept: ONCOLOGY | Facility: CLINIC | Age: 68
End: 2018-07-06

## 2018-07-06 DIAGNOSIS — C25.9 PANCREAS CARCINOMA (HCC): ICD-10-CM

## 2018-07-06 DIAGNOSIS — C78.7 LIVER METASTASES: ICD-10-CM

## 2018-07-06 DIAGNOSIS — R18.8 OTHER ASCITES: ICD-10-CM

## 2018-07-13 ENCOUNTER — RESULTS ENCOUNTER (OUTPATIENT)
Dept: ONCOLOGY | Facility: CLINIC | Age: 68
End: 2018-07-13

## 2018-07-13 DIAGNOSIS — C78.7 LIVER METASTASES: ICD-10-CM

## 2018-07-13 DIAGNOSIS — C25.9 PANCREAS CARCINOMA (HCC): ICD-10-CM

## 2018-07-20 ENCOUNTER — RESULTS ENCOUNTER (OUTPATIENT)
Dept: ONCOLOGY | Facility: CLINIC | Age: 68
End: 2018-07-20

## 2018-07-20 DIAGNOSIS — R18.8 OTHER ASCITES: ICD-10-CM

## 2018-07-20 DIAGNOSIS — C78.7 LIVER METASTASES: ICD-10-CM

## 2018-07-20 DIAGNOSIS — C25.9 PANCREAS CARCINOMA (HCC): ICD-10-CM

## 2018-07-27 ENCOUNTER — RESULTS ENCOUNTER (OUTPATIENT)
Dept: ONCOLOGY | Facility: CLINIC | Age: 68
End: 2018-07-27

## 2018-07-27 DIAGNOSIS — C25.9 PANCREAS CARCINOMA (HCC): ICD-10-CM

## 2018-07-27 DIAGNOSIS — C78.7 LIVER METASTASES: ICD-10-CM

## 2018-08-03 ENCOUNTER — RESULTS ENCOUNTER (OUTPATIENT)
Dept: ONCOLOGY | Facility: CLINIC | Age: 68
End: 2018-08-03

## 2018-08-03 DIAGNOSIS — C25.9 PANCREAS CARCINOMA (HCC): ICD-10-CM

## 2018-08-03 DIAGNOSIS — R18.8 OTHER ASCITES: ICD-10-CM

## 2018-08-03 DIAGNOSIS — C78.7 LIVER METASTASES: ICD-10-CM

## 2018-08-10 ENCOUNTER — RESULTS ENCOUNTER (OUTPATIENT)
Dept: ONCOLOGY | Facility: CLINIC | Age: 68
End: 2018-08-10

## 2018-08-10 DIAGNOSIS — C25.9 PANCREAS CARCINOMA (HCC): ICD-10-CM

## 2018-08-10 DIAGNOSIS — C78.7 LIVER METASTASES: ICD-10-CM

## 2018-08-17 ENCOUNTER — RESULTS ENCOUNTER (OUTPATIENT)
Dept: ONCOLOGY | Facility: CLINIC | Age: 68
End: 2018-08-17

## 2018-08-17 DIAGNOSIS — C25.9 PANCREAS CARCINOMA (HCC): ICD-10-CM

## 2018-08-17 DIAGNOSIS — C78.7 LIVER METASTASES: ICD-10-CM

## 2018-08-24 ENCOUNTER — RESULTS ENCOUNTER (OUTPATIENT)
Dept: ONCOLOGY | Facility: CLINIC | Age: 68
End: 2018-08-24

## 2018-08-24 DIAGNOSIS — C78.7 LIVER METASTASES: ICD-10-CM

## 2018-08-24 DIAGNOSIS — C25.9 PANCREAS CARCINOMA (HCC): ICD-10-CM

## 2018-08-31 ENCOUNTER — RESULTS ENCOUNTER (OUTPATIENT)
Dept: ONCOLOGY | Facility: CLINIC | Age: 68
End: 2018-08-31

## 2018-08-31 DIAGNOSIS — C78.7 LIVER METASTASES: ICD-10-CM

## 2018-08-31 DIAGNOSIS — C25.9 PANCREAS CARCINOMA (HCC): ICD-10-CM

## 2018-09-05 NOTE — TELEPHONE ENCOUNTER
Called in xanax to pt's pharmacy #[id number] with 1 refill  
I have personally evaluated and examined the patient. The Attending was available to me as a supervising provider if needed.

## 2021-09-19 NOTE — NURSING NOTE
Patient here for a paracentesis and pigtail catheter placement.   Spine appears normal, neck and back nontender midline, no lateral R neck ttp, no mass, no ttp RUE, clavicle, radial 2+ RUE, range of motion is not limited, no muscle or joint tenderness

## 2022-05-13 NOTE — PROGRESS NOTES
[facility] requesting new rx for Xeloda. Per Dr Manley last office note-pt will continue. I have escribed a new rx to [facility]  Phone: [phone]   normal...

## 2024-01-02 NOTE — ANESTHESIA PREPROCEDURE EVALUATION
Anesthesia Evaluation     Patient summary reviewed and Nursing notes reviewed   no history of anesthetic complications:  NPO Solid Status: > 8 hours  NPO Liquid Status: > 8 hours     Airway   Mallampati: II  TM distance: >3 FB  Neck ROM: full  no difficulty expected  Dental - normal exam     Pulmonary - normal exam   Cardiovascular - normal exam    (+) hypertension, hyperlipidemia      Neuro/Psych  (+) syncope, psychiatric history Anxiety,    GI/Hepatic/Renal/Endo    (+) obesity, morbid obesity, GERD, liver disease, diabetes mellitus type 2 using insulin,     Musculoskeletal     Abdominal  - normal exam   Substance History      OB/GYN          Other      history of cancer active                                    Anesthesia Plan    ASA 4     general     intravenous induction   Anesthetic plan and risks discussed with patient.       Consent: The patient's consent was obtained including but not limited to risks of crusting, scabbing, blistering, scarring, darker or lighter pigmentary change, recurrence, incomplete removal and infection. Medical Necessity Information: It is in your best interest to select a reason for this procedure from the list below. All of these items fulfill various CMS LCD requirements except the new and changing color options. Medical Necessity Clause: This procedure was medically necessary because the lesions that were treated were itchy and irritated. Add 52 Modifier (Optional): no Detail Level: Detailed Treatment Number (Will Not Render If 0): 0 Post-Care Instructions: I reviewed with the patient in detail post-care instructions. Patient is to wear sunprotection, and avoid picking at any of the treated lesions. Pt may apply Vaseline to crusted or scabbing areas.

## (undated) DEVICE — PRT BIOP SEALS

## (undated) DEVICE — DRAINBAG,ANTI-REFLUX TOWER,L/F,2000ML,LL: Brand: MEDLINE

## (undated) DEVICE — GLV SURG BIOGEL LTX PF 7 1/2

## (undated) DEVICE — 3M™ STERI-STRIP™ REINFORCED ADHESIVE SKIN CLOSURES, R1547, 1/2 IN X 4 IN (12 MM X 100 MM), 6 STRIPS/ENVELOPE: Brand: 3M™ STERI-STRIP™

## (undated) DEVICE — CATH URETRL FLXITP POLLACK STD 5F 70CM

## (undated) DEVICE — TIDISHIELD UROLOGY DRAIN BAGS FROSTY VINYL STERILE FITS SIEMENS UROSKOP ACCESS 20 PER CASE: Brand: TIDISHIELD

## (undated) DEVICE — DRSNG SURESITE WNDW 4X4.5

## (undated) DEVICE — GOWN,NON-REINFORCED,SIRUS,SET IN SLV,XL: Brand: MEDLINE

## (undated) DEVICE — CATH URETRL PA CONE TP 8F

## (undated) DEVICE — SUT PROLN 2/0 SH 36IN 8523H

## (undated) DEVICE — ELECTRD BLD EDGE/INSUL1P 2.4X5.1MM STRL

## (undated) DEVICE — NDL HYPO PRECISIONGLIDE REG 25G 1 1/2

## (undated) DEVICE — ANTIBACTERIAL UNDYED BRAIDED (POLYGLACTIN 910), SYNTHETIC ABSORBABLE SUTURE: Brand: COATED VICRYL

## (undated) DEVICE — URETERAL DILATATION SYSTEM

## (undated) DEVICE — FIBR LASR HOLMIUM ACCUMAX 200 1PT USE

## (undated) DEVICE — 3M™ STERI-STRIP™ COMPOUND BENZOIN TINCTURE 40 BAGS/CARTON 4 CARTONS/CASE C1544: Brand: 3M™ STERI-STRIP™

## (undated) DEVICE — GLV SURG SENSICARE MICRO PF LF 7.5 STRL

## (undated) DEVICE — APPL CHLORAPREP W/TINT 10.5ML PERC STRL

## (undated) DEVICE — LOU MINOR PROCEDURE: Brand: MEDLINE INDUSTRIES, INC.

## (undated) DEVICE — SUT MNCRYL PLS ANTIB UD 4/0 PS2 18IN

## (undated) DEVICE — PK URETSCP 40

## (undated) DEVICE — DRP C/ARM 41X74IN

## (undated) DEVICE — STPLR SKIN VISISTAT WD 35CT

## (undated) DEVICE — SPNG GZ WOVN 4X4IN 12PLY 10/BX STRL

## (undated) DEVICE — Device

## (undated) DEVICE — NITINOL WIRE WITH HYDROPHILIC TIP: Brand: SENSOR

## (undated) DEVICE — CVR PROB INTRAOP L TP 12X244CM